# Patient Record
Sex: MALE | Race: WHITE | NOT HISPANIC OR LATINO | Employment: FULL TIME | URBAN - NONMETROPOLITAN AREA
[De-identification: names, ages, dates, MRNs, and addresses within clinical notes are randomized per-mention and may not be internally consistent; named-entity substitution may affect disease eponyms.]

---

## 2017-11-28 ENCOUNTER — OFFICE VISIT (OUTPATIENT)
Dept: URGENT CARE | Facility: PHYSICIAN GROUP | Age: 42
End: 2017-11-28
Payer: COMMERCIAL

## 2017-11-28 VITALS
RESPIRATION RATE: 16 BRPM | TEMPERATURE: 98.4 F | BODY MASS INDEX: 29.53 KG/M2 | HEART RATE: 94 BPM | SYSTOLIC BLOOD PRESSURE: 142 MMHG | OXYGEN SATURATION: 95 % | HEIGHT: 72 IN | WEIGHT: 218 LBS | DIASTOLIC BLOOD PRESSURE: 80 MMHG

## 2017-11-28 DIAGNOSIS — H10.9 CONJUNCTIVITIS OF BOTH EYES, UNSPECIFIED CONJUNCTIVITIS TYPE: ICD-10-CM

## 2017-11-28 PROCEDURE — 99203 OFFICE O/P NEW LOW 30 MIN: CPT | Performed by: PHYSICIAN ASSISTANT

## 2017-11-28 RX ORDER — POLYMYXIN B SULFATE AND TRIMETHOPRIM 1; 10000 MG/ML; [USP'U]/ML
1 SOLUTION OPHTHALMIC EVERY 4 HOURS
Qty: 10 ML | Refills: 0 | Status: SHIPPED | OUTPATIENT
Start: 2017-11-28 | End: 2017-12-05

## 2017-11-28 NOTE — LETTER
November 28, 2017         Patient: Enrique Drake   YOB: 1975   Date of Visit: 11/28/2017           To Whom it May Concern:    Enrique Drake was seen in my clinic on 11/28/2017. He may return to work on 11/29/17.    If you have any questions or concerns, please don't hesitate to call.        Sincerely,           Maura Ny P.A.-C.  Electronically Signed

## 2017-11-29 NOTE — PROGRESS NOTES
Chief Complaint   Patient presents with   • Conjunctivitis     Bilateral       HISTORY OF PRESENT ILLNESS: Patient is a 42 y.o. male who presents today for the following:    Patient comes in for evaluation of red eyes that started last night. He complains of burning, itching, and copious amounts of exudate. He denies blurry vision, ocular pain, foreign body sensation, and does not wear contact lenses. His son had similar symptoms several days ago. He needs a note to miss work because his vision is intermittently blurry when he has the copious amount of exudate.    Patient Active Problem List    Diagnosis Date Noted   • Alcohol abuse 2013   • Elevated liver function tests 2013   • Elevated blood pressure 2013   • Dyslipidemia 2013   • Vitamin d deficiency 2013   • Back pain 2013       Allergies:Nkda [no known drug allergy]    Current Outpatient Prescriptions Ordered in [facility]   Medication Sig Dispense Refill   • polymixin-trimethoprim (POLYTRIM) 25551-9.1 UNIT/ML-% Solution Place 1 Drop in both eyes every 4 hours for 7 days. 10 mL 0   • vitamin D, Ergocalciferol, (DRISDOL) 44569 UNITS CAPS capsule Take 1 Cap by mouth every 7 days. 4 Cap 2   • ibuprofen (MOTRIN) 600 MG TABS Take 600 mg by mouth every 8 hours as needed.    Indications: pain       No current Epic-ordered facility-administered medications on file.        History reviewed. No pertinent past medical history.    Social History   Substance Use Topics   • Smoking status: Current Every Day Smoker     Packs/day: 1.00     Years: 20.00     Types: Cigarettes     Last attempt to quit: 3/3/2013   • Smokeless tobacco: Former User     Types: Chew     Quit date: 3/4/2012      Comment: chewed for 10 years   • Alcohol use 21.0 oz/week     42 Cans of beer per week       Family Status   Relation Status   • Mother  at age 66    pneumonia   • Father Alive    65 in    • Brother Alive    44 in    • Maternal Grandmother   at age 70's    unknown   • Maternal Grandfather  at age 60's    MI   • Paternal Grandmother  at age 78    unknown   • Paternal Grandfather     unknown   • Brother Alive    38 in      Family History   Problem Relation Age of Onset   • Other Mother      smoker   • Lung Disease Mother      COPD   • Diabetes Father    • Other Brother      gout   • Heart Attack Maternal Grandfather    • Other Brother      epilepsy       ROS:   Review of Systems   Constitutional: Negative for fever, chills, weight loss and malaise/fatigue.   HENT: Negative for ear pain, nosebleeds, congestion, sore throat and neck pain.    Eyes: Positive for blurred vision.   Respiratory: Negative for cough, sputum production, shortness of breath and wheezing.    Cardiovascular: Negative for chest pain, palpitations, orthopnea and leg swelling.   Gastrointestinal: Negative for heartburn, nausea, vomiting and abdominal pain.   Genitourinary: Negative for dysuria, urgency and frequency.       Exam:  Blood pressure 142/80, pulse 94, temperature 36.9 °C (98.4 °F), resp. rate 16, height 1.829 m (6'), weight 98.9 kg (218 lb), SpO2 95 %.  General: Well developed, well nourished. No distress.  HEENT: Conjunctiva injected bilaterally with a moderate amount of exudate. PERRL, EOMI.  Pulmonary: No respiratory distress noted.  Neurologic: Grossly nonfocal.  Skin: Warm, dry, good turgor. No rashes in visible areas.   Psych: Normal mood. Alert and oriented x3. Judgment and insight is normal.    Assessment/Plan:  Take all medications as directed. Follow up for worsening or persistent symptoms.  1. Conjunctivitis of both eyes, unspecified conjunctivitis type  polymixin-trimethoprim (POLYTRIM) 21337-5.1 UNIT/ML-% Solution

## 2019-08-09 ENCOUNTER — HOSPITAL ENCOUNTER (OUTPATIENT)
Dept: LAB | Facility: MEDICAL CENTER | Age: 44
End: 2019-08-09
Attending: FAMILY MEDICINE
Payer: COMMERCIAL

## 2019-08-09 LAB
ALBUMIN SERPL BCP-MCNC: 3.3 G/DL (ref 3.2–4.9)
ALBUMIN/GLOB SERPL: 0.9 G/DL
ALP SERPL-CCNC: 122 U/L (ref 30–99)
ALT SERPL-CCNC: 16 U/L (ref 2–50)
ANION GAP SERPL CALC-SCNC: 9 MMOL/L (ref 0–11.9)
AST SERPL-CCNC: 45 U/L (ref 12–45)
BASOPHILS # BLD AUTO: 1 % (ref 0–1.8)
BASOPHILS # BLD: 0.07 K/UL (ref 0–0.12)
BILIRUB SERPL-MCNC: 3.1 MG/DL (ref 0.1–1.5)
BUN SERPL-MCNC: 7 MG/DL (ref 8–22)
CALCIUM SERPL-MCNC: 8.9 MG/DL (ref 8.5–10.5)
CHLORIDE SERPL-SCNC: 105 MMOL/L (ref 96–112)
CHOLEST SERPL-MCNC: 165 MG/DL (ref 100–199)
CO2 SERPL-SCNC: 25 MMOL/L (ref 20–33)
CREAT SERPL-MCNC: 0.66 MG/DL (ref 0.5–1.4)
EOSINOPHIL # BLD AUTO: 0.45 K/UL (ref 0–0.51)
EOSINOPHIL NFR BLD: 6.4 % (ref 0–6.9)
ERYTHROCYTE [DISTWIDTH] IN BLOOD BY AUTOMATED COUNT: 56.7 FL (ref 35.9–50)
FASTING STATUS PATIENT QL REPORTED: NORMAL
FERRITIN SERPL-MCNC: 45.7 NG/ML (ref 22–322)
GLOBULIN SER CALC-MCNC: 3.6 G/DL (ref 1.9–3.5)
GLUCOSE SERPL-MCNC: 112 MG/DL (ref 65–99)
HCT VFR BLD AUTO: 34.1 % (ref 42–52)
HDLC SERPL-MCNC: 30 MG/DL
HGB BLD-MCNC: 10.5 G/DL (ref 14–18)
IMM GRANULOCYTES # BLD AUTO: 0.02 K/UL (ref 0–0.11)
IMM GRANULOCYTES NFR BLD AUTO: 0.3 % (ref 0–0.9)
IRON SATN MFR SERPL: 6 % (ref 15–55)
IRON SERPL-MCNC: 27 UG/DL (ref 50–180)
LDLC SERPL CALC-MCNC: 111 MG/DL
LYMPHOCYTES # BLD AUTO: 2.04 K/UL (ref 1–4.8)
LYMPHOCYTES NFR BLD: 29.2 % (ref 22–41)
MCH RBC QN AUTO: 30.3 PG (ref 27–33)
MCHC RBC AUTO-ENTMCNC: 30.8 G/DL (ref 33.7–35.3)
MCV RBC AUTO: 98.3 FL (ref 81.4–97.8)
MONOCYTES # BLD AUTO: 0.74 K/UL (ref 0–0.85)
MONOCYTES NFR BLD AUTO: 10.6 % (ref 0–13.4)
NEUTROPHILS # BLD AUTO: 3.66 K/UL (ref 1.82–7.42)
NEUTROPHILS NFR BLD: 52.5 % (ref 44–72)
NRBC # BLD AUTO: 0 K/UL
NRBC BLD-RTO: 0 /100 WBC
NT-PROBNP SERPL IA-MCNC: 206 PG/ML (ref 0–125)
PLATELET # BLD AUTO: 127 K/UL (ref 164–446)
PMV BLD AUTO: 10.5 FL (ref 9–12.9)
POTASSIUM SERPL-SCNC: 4.2 MMOL/L (ref 3.6–5.5)
PROT SERPL-MCNC: 6.9 G/DL (ref 6–8.2)
RBC # BLD AUTO: 3.47 M/UL (ref 4.7–6.1)
SODIUM SERPL-SCNC: 139 MMOL/L (ref 135–145)
T3FREE SERPL-MCNC: 3.8 PG/ML (ref 2.4–4.2)
T4 FREE SERPL-MCNC: 1.51 NG/DL (ref 0.53–1.43)
TIBC SERPL-MCNC: 448 UG/DL (ref 250–450)
TRANSFERRIN SERPL-MCNC: 303 MG/DL (ref 200–370)
TRIGL SERPL-MCNC: 120 MG/DL (ref 0–149)
TSH SERPL DL<=0.005 MIU/L-ACNC: 1.08 UIU/ML (ref 0.38–5.33)
WBC # BLD AUTO: 7 K/UL (ref 4.8–10.8)

## 2019-08-09 PROCEDURE — 80053 COMPREHEN METABOLIC PANEL: CPT

## 2019-08-09 PROCEDURE — 83540 ASSAY OF IRON: CPT

## 2019-08-09 PROCEDURE — 86800 THYROGLOBULIN ANTIBODY: CPT

## 2019-08-09 PROCEDURE — 80061 LIPID PANEL: CPT

## 2019-08-09 PROCEDURE — 82728 ASSAY OF FERRITIN: CPT

## 2019-08-09 PROCEDURE — 83880 ASSAY OF NATRIURETIC PEPTIDE: CPT

## 2019-08-09 PROCEDURE — 84439 ASSAY OF FREE THYROXINE: CPT

## 2019-08-09 PROCEDURE — 36415 COLL VENOUS BLD VENIPUNCTURE: CPT

## 2019-08-09 PROCEDURE — 84481 FREE ASSAY (FT-3): CPT

## 2019-08-09 PROCEDURE — 84443 ASSAY THYROID STIM HORMONE: CPT

## 2019-08-09 PROCEDURE — 84466 ASSAY OF TRANSFERRIN: CPT

## 2019-08-09 PROCEDURE — 83550 IRON BINDING TEST: CPT

## 2019-08-09 PROCEDURE — 85025 COMPLETE CBC W/AUTO DIFF WBC: CPT

## 2019-08-11 LAB — THYROGLOB AB SERPL-ACNC: <0.9 IU/ML (ref 0–4)

## 2020-03-23 ENCOUNTER — OFFICE VISIT (OUTPATIENT)
Dept: URGENT CARE | Facility: PHYSICIAN GROUP | Age: 45
End: 2020-03-23
Payer: COMMERCIAL

## 2020-03-23 VITALS
DIASTOLIC BLOOD PRESSURE: 80 MMHG | RESPIRATION RATE: 16 BRPM | SYSTOLIC BLOOD PRESSURE: 130 MMHG | OXYGEN SATURATION: 97 % | HEIGHT: 72 IN | BODY MASS INDEX: 27.5 KG/M2 | WEIGHT: 203 LBS | TEMPERATURE: 98.3 F | HEART RATE: 79 BPM

## 2020-03-23 DIAGNOSIS — J02.8 ACUTE PHARYNGITIS DUE TO OTHER SPECIFIED ORGANISMS: ICD-10-CM

## 2020-03-23 LAB
INT CON NEG: NEGATIVE
INT CON POS: POSITIVE
S PYO AG THROAT QL: NORMAL

## 2020-03-23 PROCEDURE — 99203 OFFICE O/P NEW LOW 30 MIN: CPT | Performed by: INTERNAL MEDICINE

## 2020-03-23 PROCEDURE — 87880 STREP A ASSAY W/OPTIC: CPT | Performed by: INTERNAL MEDICINE

## 2020-03-23 ASSESSMENT — ENCOUNTER SYMPTOMS
SWOLLEN GLANDS: 1
FEVER: 0
TROUBLE SWALLOWING: 0
SHORTNESS OF BREATH: 1
HEADACHES: 0
CHILLS: 0
COUGH: 0

## 2020-03-23 ASSESSMENT — FIBROSIS 4 INDEX: FIB4 SCORE: 3.72

## 2020-03-23 NOTE — LETTER
March 23, 2020       Patient: Enrique Drake   YOB: 1975   Date of Visit: 3/23/2020         To Whom It May Concern:    It is my medical opinion that Enrique Drake return to full duty, no restrictions..    If you have any questions or concerns, please don't hesitate to call [phone]          Sincerely,          Zia Huggins M.D.  Electronically Signed

## 2020-03-23 NOTE — PROGRESS NOTES
Subjective:     Enrique Drake is a 44 y.o. male who presents for Pharyngitis (needs note to go back to work is feeling better)       Pharyngitis    This is a new problem. The current episode started in the past 7 days. The problem has been gradually improving. There has been no fever. The pain is mild. Associated symptoms include shortness of breath and swollen glands. Pertinent negatives include no congestion, coughing, headaches or trouble swallowing.   History reviewed. No pertinent past medical history.History reviewed. No pertinent surgical history.  Social History     Socioeconomic History   • Marital status:      Spouse name: Not on file   • Number of children: Not on file   • Years of education: Not on file   • Highest education level: Not on file   Occupational History   • Not on file   Social Needs   • Financial resource strain: Not on file   • Food insecurity     Worry: Not on file     Inability: Not on file   • Transportation needs     Medical: Not on file     Non-medical: Not on file   Tobacco Use   • Smoking status: Current Every Day Smoker     Packs/day: 1.00     Years: 20.00     Pack years: 20.00     Types: Cigarettes     Last attempt to quit: 3/3/2013     Years since quittin.0   • Smokeless tobacco: Former User     Types: Chew     Quit date: 3/4/2012   • Tobacco comment: chewed for 10 years   Substance and Sexual Activity   • Alcohol use: Not Currently     Alcohol/week: 21.0 oz     Types: 42 Cans of beer per week   • Drug use: Yes     Types: Marijuana     Comment: at night prn   • Sexual activity: Yes     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: , printer   Lifestyle   • Physical activity     Days per week: Not on file     Minutes per session: Not on file   • Stress: Not on file   Relationships   • Social connections     Talks on phone: Not on file     Gets together: Not on file     Attends Worship service: Not on file     Active member of club or organization: Not on file      Attends meetings of clubs or organizations: Not on file     Relationship status: Not on file   • Intimate partner violence     Fear of current or ex partner: Not on file     Emotionally abused: Not on file     Physically abused: Not on file     Forced sexual activity: Not on file   Other Topics Concern   • Not on file   Social History Narrative   • Not on file      Family History   Problem Relation Age of Onset   • Other Mother         smoker   • Lung Disease Mother         COPD   • Diabetes Father    • Other Brother         gout   • Heart Attack Maternal Grandfather    • Other Brother         epilepsy    Review of Systems   Constitutional: Negative for chills and fever.   HENT: Negative for congestion and trouble swallowing.    Respiratory: Positive for shortness of breath. Negative for cough.    Neurological: Negative for headaches.   All other systems reviewed and are negative.    Allergies   Allergen Reactions   • Nkda [No Known Drug Allergy]       Objective:   /80   Pulse 79   Temp 36.8 °C (98.3 °F) (Temporal)   Resp 16   Ht 1.829 m (6')   Wt 92.1 kg (203 lb)   SpO2 97%   BMI 27.53 kg/m²   Physical Exam  Vitals signs reviewed.   Constitutional:       General: He is not in acute distress.     Appearance: He is well-developed.   HENT:      Head: Normocephalic and atraumatic.      Mouth/Throat:      Mouth: Mucous membranes are moist.      Pharynx: Oropharynx is clear. Uvula midline. Posterior oropharyngeal erythema present. No oropharyngeal exudate.      Tonsils: No tonsillar abscesses.   Eyes:      Conjunctiva/sclera: Conjunctivae normal.   Neck:      Musculoskeletal: No neck rigidity.   Cardiovascular:      Rate and Rhythm: Normal rate and regular rhythm.   Pulmonary:      Effort: Pulmonary effort is normal. No respiratory distress.      Breath sounds: Normal breath sounds.   Lymphadenopathy:      Cervical: No cervical adenopathy.   Skin:     General: Skin is warm and dry.      Capillary Refill:  Capillary refill takes less than 2 seconds.   Neurological:      Mental Status: He is alert and oriented to person, place, and time.      Sensory: No sensory deficit.      Deep Tendon Reflexes: Reflexes are normal and symmetric.   Psychiatric:         Mood and Affect: Mood normal.         Behavior: Behavior normal.           Assessment/Plan:   Assessment    1. Acute pharyngitis due to other specified organisms  - POCT Rapid Strep A    Strep was negative    Advised patient to take Tylenol 1 g 3 times a day as needed, ibuprofen 600 mg 4 times a day as needed    Work note was given    High risk conditions including peritonsillar abscess, coronavirus was considered in the differential diagnosis  Differential diagnosis, natural history, supportive care, and indications for immediate follow-up discussed.

## 2020-06-15 ENCOUNTER — HOSPITAL ENCOUNTER (INPATIENT)
Facility: MEDICAL CENTER | Age: 45
LOS: 2 days | DRG: 370 | End: 2020-06-17
Attending: EMERGENCY MEDICINE | Admitting: HOSPITALIST
Payer: COMMERCIAL

## 2020-06-15 DIAGNOSIS — R71.0 HEMOGLOBIN DECREASED: ICD-10-CM

## 2020-06-15 DIAGNOSIS — K92.2 UPPER GI BLEED: ICD-10-CM

## 2020-06-15 DIAGNOSIS — K92.0 HEMATEMESIS WITH NAUSEA: ICD-10-CM

## 2020-06-15 DIAGNOSIS — I85.01 BLEEDING ESOPHAGEAL VARICES, UNSPECIFIED ESOPHAGEAL VARICES TYPE (HCC): ICD-10-CM

## 2020-06-15 PROBLEM — Z72.0 TOBACCO ABUSE: Status: ACTIVE | Noted: 2020-06-15

## 2020-06-15 PROBLEM — K70.30 ALCOHOLIC CIRRHOSIS OF LIVER WITHOUT ASCITES (HCC): Status: ACTIVE | Noted: 2020-06-15

## 2020-06-15 LAB
ABO + RH BLD: NORMAL
ABO GROUP BLD: NORMAL
ALBUMIN SERPL BCP-MCNC: 3.7 G/DL (ref 3.2–4.9)
ALBUMIN/GLOB SERPL: 1.3 G/DL
ALP SERPL-CCNC: 178 U/L (ref 30–99)
ALT SERPL-CCNC: 24 U/L (ref 2–50)
ANION GAP SERPL CALC-SCNC: 15 MMOL/L (ref 7–16)
ANISOCYTOSIS BLD QL SMEAR: ABNORMAL
APTT PPP: 43.2 SEC (ref 24.7–36)
AST SERPL-CCNC: 77 U/L (ref 12–45)
BASOPHILS # BLD AUTO: 1 % (ref 0–1.8)
BASOPHILS # BLD: 0.08 K/UL (ref 0–0.12)
BILIRUB SERPL-MCNC: 7.1 MG/DL (ref 0.1–1.5)
BLD GP AB SCN SERPL QL: NORMAL
BUN SERPL-MCNC: 11 MG/DL (ref 8–22)
CALCIUM SERPL-MCNC: 8.1 MG/DL (ref 8.5–10.5)
CHLORIDE SERPL-SCNC: 106 MMOL/L (ref 96–112)
CO2 SERPL-SCNC: 19 MMOL/L (ref 20–33)
COMMENT 1642: NORMAL
CREAT SERPL-MCNC: 0.33 MG/DL (ref 0.5–1.4)
EKG IMPRESSION: NORMAL
EOSINOPHIL # BLD AUTO: 0.06 K/UL (ref 0–0.51)
EOSINOPHIL NFR BLD: 0.8 % (ref 0–6.9)
ERYTHROCYTE [DISTWIDTH] IN BLOOD BY AUTOMATED COUNT: 70.5 FL (ref 35.9–50)
GLOBULIN SER CALC-MCNC: 2.9 G/DL (ref 1.9–3.5)
GLUCOSE SERPL-MCNC: 127 MG/DL (ref 65–99)
HCT VFR BLD AUTO: 34.8 % (ref 42–52)
HGB BLD-MCNC: 10.3 G/DL (ref 14–18)
HGB BLD-MCNC: 11.2 G/DL (ref 14–18)
IMM GRANULOCYTES # BLD AUTO: 0.05 K/UL (ref 0–0.11)
IMM GRANULOCYTES NFR BLD AUTO: 0.7 % (ref 0–0.9)
INR PPP: 1.83 (ref 0.87–1.13)
LYMPHOCYTES # BLD AUTO: 1.45 K/UL (ref 1–4.8)
LYMPHOCYTES NFR BLD: 19 % (ref 22–41)
MACROCYTES BLD QL SMEAR: ABNORMAL
MCH RBC QN AUTO: 28.9 PG (ref 27–33)
MCHC RBC AUTO-ENTMCNC: 32.2 G/DL (ref 33.7–35.3)
MCV RBC AUTO: 89.7 FL (ref 81.4–97.8)
MICROCYTES BLD QL SMEAR: ABNORMAL
MONOCYTES # BLD AUTO: 0.79 K/UL (ref 0–0.85)
MONOCYTES NFR BLD AUTO: 10.4 % (ref 0–13.4)
MORPHOLOGY BLD-IMP: NORMAL
NEUTROPHILS # BLD AUTO: 5.19 K/UL (ref 1.82–7.42)
NEUTROPHILS NFR BLD: 68.1 % (ref 44–72)
NRBC # BLD AUTO: 0 K/UL
NRBC BLD-RTO: 0 /100 WBC
OVALOCYTES BLD QL SMEAR: NORMAL
PLATELET # BLD AUTO: 82 K/UL (ref 164–446)
PLATELET BLD QL SMEAR: NORMAL
PMV BLD AUTO: 10.9 FL (ref 9–12.9)
POIKILOCYTOSIS BLD QL SMEAR: NORMAL
POLYCHROMASIA BLD QL SMEAR: NORMAL
POTASSIUM SERPL-SCNC: 4.1 MMOL/L (ref 3.6–5.5)
PROT SERPL-MCNC: 6.6 G/DL (ref 6–8.2)
PROTHROMBIN TIME: 21.8 SEC (ref 12–14.6)
RBC # BLD AUTO: 3.88 M/UL (ref 4.7–6.1)
RBC BLD AUTO: PRESENT
RH BLD: NORMAL
SODIUM SERPL-SCNC: 140 MMOL/L (ref 135–145)
WBC # BLD AUTO: 7.6 K/UL (ref 4.8–10.8)

## 2020-06-15 PROCEDURE — 85730 THROMBOPLASTIN TIME PARTIAL: CPT

## 2020-06-15 PROCEDURE — 36415 COLL VENOUS BLD VENIPUNCTURE: CPT

## 2020-06-15 PROCEDURE — 85025 COMPLETE CBC W/AUTO DIFF WBC: CPT

## 2020-06-15 PROCEDURE — 99406 BEHAV CHNG SMOKING 3-10 MIN: CPT | Performed by: HOSPITALIST

## 2020-06-15 PROCEDURE — 700111 HCHG RX REV CODE 636 W/ 250 OVERRIDE (IP): Performed by: HOSPITALIST

## 2020-06-15 PROCEDURE — 99358 PROLONG SERVICE W/O CONTACT: CPT | Mod: 25 | Performed by: HOSPITALIST

## 2020-06-15 PROCEDURE — 93005 ELECTROCARDIOGRAM TRACING: CPT | Performed by: EMERGENCY MEDICINE

## 2020-06-15 PROCEDURE — 96368 THER/DIAG CONCURRENT INF: CPT

## 2020-06-15 PROCEDURE — 770020 HCHG ROOM/CARE - TELE (206)

## 2020-06-15 PROCEDURE — 700105 HCHG RX REV CODE 258: Performed by: HOSPITALIST

## 2020-06-15 PROCEDURE — 85610 PROTHROMBIN TIME: CPT

## 2020-06-15 PROCEDURE — 99285 EMERGENCY DEPT VISIT HI MDM: CPT

## 2020-06-15 PROCEDURE — 86901 BLOOD TYPING SEROLOGIC RH(D): CPT

## 2020-06-15 PROCEDURE — C9113 INJ PANTOPRAZOLE SODIUM, VIA: HCPCS | Performed by: EMERGENCY MEDICINE

## 2020-06-15 PROCEDURE — 80053 COMPREHEN METABOLIC PANEL: CPT

## 2020-06-15 PROCEDURE — 700111 HCHG RX REV CODE 636 W/ 250 OVERRIDE (IP): Performed by: EMERGENCY MEDICINE

## 2020-06-15 PROCEDURE — 96365 THER/PROPH/DIAG IV INF INIT: CPT

## 2020-06-15 PROCEDURE — 99223 1ST HOSP IP/OBS HIGH 75: CPT | Mod: 25 | Performed by: HOSPITALIST

## 2020-06-15 PROCEDURE — 85018 HEMOGLOBIN: CPT

## 2020-06-15 PROCEDURE — 86850 RBC ANTIBODY SCREEN: CPT

## 2020-06-15 PROCEDURE — 700105 HCHG RX REV CODE 258: Performed by: EMERGENCY MEDICINE

## 2020-06-15 PROCEDURE — 86900 BLOOD TYPING SEROLOGIC ABO: CPT

## 2020-06-15 RX ORDER — PROCHLORPERAZINE EDISYLATE 5 MG/ML
5-10 INJECTION INTRAMUSCULAR; INTRAVENOUS EVERY 4 HOURS PRN
Status: DISCONTINUED | OUTPATIENT
Start: 2020-06-15 | End: 2020-06-17 | Stop reason: HOSPADM

## 2020-06-15 RX ORDER — NAPROXEN SODIUM 220 MG
220 TABLET ORAL
Status: ON HOLD | COMMUNITY
End: 2020-06-17

## 2020-06-15 RX ORDER — SODIUM CHLORIDE 9 MG/ML
1000 INJECTION, SOLUTION INTRAVENOUS ONCE
Status: COMPLETED | OUTPATIENT
Start: 2020-06-15 | End: 2020-06-15

## 2020-06-15 RX ORDER — ONDANSETRON 2 MG/ML
4 INJECTION INTRAMUSCULAR; INTRAVENOUS EVERY 4 HOURS PRN
Status: DISCONTINUED | OUTPATIENT
Start: 2020-06-15 | End: 2020-06-17 | Stop reason: HOSPADM

## 2020-06-15 RX ORDER — SODIUM CHLORIDE, SODIUM LACTATE, POTASSIUM CHLORIDE, CALCIUM CHLORIDE 600; 310; 30; 20 MG/100ML; MG/100ML; MG/100ML; MG/100ML
INJECTION, SOLUTION INTRAVENOUS CONTINUOUS
Status: DISCONTINUED | OUTPATIENT
Start: 2020-06-15 | End: 2020-06-15

## 2020-06-15 RX ORDER — ONDANSETRON 4 MG/1
4 TABLET, ORALLY DISINTEGRATING ORAL EVERY 4 HOURS PRN
Status: DISCONTINUED | OUTPATIENT
Start: 2020-06-15 | End: 2020-06-17 | Stop reason: HOSPADM

## 2020-06-15 RX ORDER — PROMETHAZINE HYDROCHLORIDE 25 MG/1
12.5-25 SUPPOSITORY RECTAL EVERY 4 HOURS PRN
Status: DISCONTINUED | OUTPATIENT
Start: 2020-06-15 | End: 2020-06-17 | Stop reason: HOSPADM

## 2020-06-15 RX ORDER — PROMETHAZINE HYDROCHLORIDE 25 MG/1
12.5-25 TABLET ORAL EVERY 4 HOURS PRN
Status: DISCONTINUED | OUTPATIENT
Start: 2020-06-15 | End: 2020-06-17 | Stop reason: HOSPADM

## 2020-06-15 RX ADMIN — SODIUM CHLORIDE, POTASSIUM CHLORIDE, SODIUM LACTATE AND CALCIUM CHLORIDE: 600; 310; 30; 20 INJECTION, SOLUTION INTRAVENOUS at 18:07

## 2020-06-15 RX ADMIN — OCTREOTIDE ACETATE 50 MCG/HR: 200 INJECTION, SOLUTION INTRAVENOUS; SUBCUTANEOUS at 18:07

## 2020-06-15 RX ADMIN — SODIUM CHLORIDE 1000 ML: 9 INJECTION, SOLUTION INTRAVENOUS at 15:25

## 2020-06-15 RX ADMIN — ONDANSETRON 4 MG: 4 TABLET, ORALLY DISINTEGRATING ORAL at 18:24

## 2020-06-15 RX ADMIN — CEFTRIAXONE SODIUM 2 G: 2 INJECTION, POWDER, FOR SOLUTION INTRAMUSCULAR; INTRAVENOUS at 17:25

## 2020-06-15 RX ADMIN — SODIUM CHLORIDE 8 MG/HR: 9 INJECTION, SOLUTION INTRAVENOUS at 17:11

## 2020-06-15 ASSESSMENT — ENCOUNTER SYMPTOMS
HEMOPTYSIS: 0
SHORTNESS OF BREATH: 0
WHEEZING: 0
DOUBLE VISION: 0
NECK PAIN: 0
PND: 0
PHOTOPHOBIA: 0
TREMORS: 0
DIARRHEA: 0
HALLUCINATIONS: 0
COUGH: 0
FLANK PAIN: 0
FALLS: 0
CHILLS: 0
VOMITING: 1
SORE THROAT: 0
EYE PAIN: 0
DIZZINESS: 0
HEADACHES: 0
MYALGIAS: 0
CONSTIPATION: 0
BACK PAIN: 0
DIAPHORESIS: 0
CLAUDICATION: 0
ORTHOPNEA: 0
SPUTUM PRODUCTION: 0
ABDOMINAL PAIN: 0
NAUSEA: 1
PALPITATIONS: 0
TINGLING: 0
WEAKNESS: 0
SINUS PAIN: 0
BRUISES/BLEEDS EASILY: 0
STRIDOR: 0
BLURRED VISION: 0
BLOOD IN STOOL: 1
HEARTBURN: 0
DEPRESSION: 0
POLYDIPSIA: 0
FEVER: 0

## 2020-06-15 ASSESSMENT — LIFESTYLE VARIABLES
CONSUMPTION TOTAL: POSITIVE
DOES PATIENT WANT TO TALK TO SOMEONE ABOUT QUITTING: NO
ON A TYPICAL DAY WHEN YOU DRINK ALCOHOL HOW MANY DRINKS DO YOU HAVE: 3
HAVE PEOPLE ANNOYED YOU BY CRITICIZING YOUR DRINKING: NO
TOTAL SCORE: 2
EVER FELT BAD OR GUILTY ABOUT YOUR DRINKING: YES
SUBSTANCE_ABUSE: 0
HOW MANY TIMES IN THE PAST YEAR HAVE YOU HAD 5 OR MORE DRINKS IN A DAY: 7
TOTAL SCORE: 2
AVERAGE NUMBER OF DAYS PER WEEK YOU HAVE A DRINK CONTAINING ALCOHOL: 6
ALCOHOL_USE: YES
TOTAL SCORE: 2
HAVE YOU EVER FELT YOU SHOULD CUT DOWN ON YOUR DRINKING: YES
EVER HAD A DRINK FIRST THING IN THE MORNING TO STEADY YOUR NERVES TO GET RID OF A HANGOVER: NO
DOES PATIENT WANT TO STOP DRINKING: YES

## 2020-06-15 ASSESSMENT — COGNITIVE AND FUNCTIONAL STATUS - GENERAL
SUGGESTED CMS G CODE MODIFIER MOBILITY: CH
MOBILITY SCORE: 24
DAILY ACTIVITIY SCORE: 24
SUGGESTED CMS G CODE MODIFIER DAILY ACTIVITY: CH

## 2020-06-15 ASSESSMENT — COPD QUESTIONNAIRES
DO YOU EVER COUGH UP ANY MUCUS OR PHLEGM?: YES, A FEW DAYS A WEEK OR MONTH
COPD SCREENING SCORE: 3
HAVE YOU SMOKED AT LEAST 100 CIGARETTES IN YOUR ENTIRE LIFE: YES
DURING THE PAST 4 WEEKS HOW MUCH DID YOU FEEL SHORT OF BREATH: NONE/LITTLE OF THE TIME

## 2020-06-15 ASSESSMENT — FIBROSIS 4 INDEX
FIB4 SCORE: 3.81
FIB4 SCORE: 8.24
FIB4 SCORE: 8.24

## 2020-06-15 ASSESSMENT — PATIENT HEALTH QUESTIONNAIRE - PHQ9
1. LITTLE INTEREST OR PLEASURE IN DOING THINGS: NOT AT ALL
SUM OF ALL RESPONSES TO PHQ9 QUESTIONS 1 AND 2: 0
2. FEELING DOWN, DEPRESSED, IRRITABLE, OR HOPELESS: NOT AT ALL

## 2020-06-15 NOTE — ED PROVIDER NOTES
ED Provider  Scribed for Juan Francisco Valentin D.O. by Joe Orozco. 6/15/2020  3:08 PM    Means of arrival: EMS  History obtained from: Patient  History limited by: None    CHIEF COMPLAINT  Chief Complaint   Patient presents with   • Blood in Vomit   • Rectal Bleeding       HPI  Enrique Drake is a 45 y.o. male presents as a transfer patient from [facility]. Per family, the patient has a history of ulcers and esophageal varices both diagnosed by endoscopy. at [facility]. Patient has required blood transfusions secondary to blood loss from his ulcers. Patient was told that his ulcers were healing and yesterday he was doing well, however was drinking alcohol. Today he suddenly started vomiting blood and thus went to [facility], however was transferred here as they have since lost their GI services. Patient has tried to follow up with [facility], however has been unable to be seen despite his best efforts and interventions from his physicians. He otherwise denies having any abdominal pain, chest pain, shortness of breath, cough, congestion, fevers or chills.    REVIEW OF SYSTEMS  See HPI for further details. All other systems are negative.     PAST MEDICAL HISTORY  Cirrhosis, Esophageal varices    SOCIAL HISTORY  Social History     Tobacco Use   • Smoking status: Current Every Day Smoker     Packs/day: 1.00     Years: 20.00     Pack years: 20.00     Types: Cigarettes     Last attempt to quit: 3/3/2013     Years since quittin.2   • Smokeless tobacco: Former User     Types: Chew     Quit date: 3/4/2012   • Tobacco comment: chewed for 10 years   Substance and Sexual Activity   • Alcohol use: Not Currently     Alcohol/week: 21.0 oz     Types: 42 Cans of beer per week   • Drug use: Yes     Types: Marijuana     Comment: at night prn   • Sexual activity: Yes     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      Current Outpatient Medications on File Prior to Encounter   Medication Sig Dispense Refill   • naproxen (ANAPROX) 220 MG tablet Take 220 mg by mouth 1 time daily as needed.         ALLERGIES  Allergies   Allergen Reactions   • Nkda [No Known Drug Allergy]        PHYSICAL EXAM  VITAL SIGNS: /75   Pulse (!) 115   Temp 37.3 °C (99.1 °F) (Temporal)   Resp 20   Ht 1.829 m (6')   Wt 93 kg (205 lb)   SpO2 96%   BMI 27.80 kg/m²   Constitutional: Alert  HENT: No signs of trauma, mucous membranes are moist  Eyes: Conjunctiva normal, Non-icteric.   Neck: Normal range of motion, No tenderness, Supple.  Lymphatic: No lymphadenopathy noted.   Cardiovascular: Tachycardic rate, regular rhythm, no murmurs.   Thorax & Lungs: Normal breath sounds, No respiratory distress, No wheezing, No chest tenderness.   Abdomen: Bowel sounds normal, Soft, No tenderness, No masses, No pulsatile masses. No peritoneal signs.  Skin: Warm, Dry, Pale,  Back: No bony tenderness, No CVA tenderness.   Extremities: No edema, No tenderness, No cyanosis  Musculoskeletal: Good range of motion in all major joints. No tenderness to palpation or major deformities noted.   Neurologic: Alert and oriented x4, Normal motor function, Normal sensory function, No focal deficits noted.   Psychiatric: Affect normal, Judgment normal, Mood normal.     DIAGNOSTIC STUDIES / PROCEDURES    EKG  12 Lead EKG interpreted by me shown below.     LABS  Results for orders placed or performed during the hospital encounter of 06/15/20   CBC WITH DIFFERENTIAL   Result Value Ref Range    WBC 7.6 4.8 - 10.8 K/uL    RBC 3.88 (L) 4.70 - 6.10 M/uL    Hemoglobin 11.2 (L) 14.0 - 18.0 g/dL    Hematocrit 34.8 (L) 42.0 - 52.0 %    MCV 89.7 81.4 - 97.8 fL    MCH 28.9 27.0 - 33.0 pg    MCHC 32.2 (L) 33.7 - 35.3 g/dL    RDW 70.5 (H) 35.9 - 50.0 fL    Platelet Count 82 (L) 164 - 446 K/uL    MPV 10.9 9.0 - 12.9 fL     Neutrophils-Polys 68.10 44.00 - 72.00 %    Lymphocytes 19.00 (L) 22.00 - 41.00 %    Monocytes 10.40 0.00 - 13.40 %    Eosinophils 0.80 0.00 - 6.90 %    Basophils 1.00 0.00 - 1.80 %    Immature Granulocytes 0.70 0.00 - 0.90 %    Nucleated RBC 0.00 /100 WBC    Neutrophils (Absolute) 5.19 1.82 - 7.42 K/uL    Lymphs (Absolute) 1.45 1.00 - 4.80 K/uL    Monos (Absolute) 0.79 0.00 - 0.85 K/uL    Eos (Absolute) 0.06 0.00 - 0.51 K/uL    Baso (Absolute) 0.08 0.00 - 0.12 K/uL    Immature Granulocytes (abs) 0.05 0.00 - 0.11 K/uL    NRBC (Absolute) 0.00 K/uL    Anisocytosis 2+     Macrocytosis 1+     Microcytosis 1+    COMP METABOLIC PANEL   Result Value Ref Range    Sodium 140 135 - 145 mmol/L    Potassium 4.1 3.6 - 5.5 mmol/L    Chloride 106 96 - 112 mmol/L    Co2 19 (L) 20 - 33 mmol/L    Anion Gap 15.0 7.0 - 16.0    Glucose 127 (H) 65 - 99 mg/dL    Bun 11 8 - 22 mg/dL    Creatinine 0.33 (L) 0.50 - 1.40 mg/dL    Calcium 8.1 (L) 8.5 - 10.5 mg/dL    AST(SGOT) 77 (H) 12 - 45 U/L    ALT(SGPT) 24 2 - 50 U/L    Alkaline Phosphatase 178 (H) 30 - 99 U/L    Total Bilirubin 7.1 (H) 0.1 - 1.5 mg/dL    Albumin 3.7 3.2 - 4.9 g/dL    Total Protein 6.6 6.0 - 8.2 g/dL    Globulin 2.9 1.9 - 3.5 g/dL    A-G Ratio 1.3 g/dL   PROTHROMBIN TIME (INR)   Result Value Ref Range    PT 21.8 (H) 12.0 - 14.6 sec    INR 1.83 (H) 0.87 - 1.13   APTT   Result Value Ref Range    APTT 43.2 (H) 24.7 - 36.0 sec   COD (ADULT)   Result Value Ref Range    ABO Grouping Only O     Rh Grouping Only POS     Antibody Screen-Cod NEG    ABO Rh Confirm   Result Value Ref Range    ABO Rh Confirm O POS    PERIPHERAL SMEAR REVIEW   Result Value Ref Range    Peripheral Smear Review see below    PLATELET ESTIMATE   Result Value Ref Range    Plt Estimation Decreased    MORPHOLOGY   Result Value Ref Range    RBC Morphology Present     Polychromia 1+     Poikilocytosis 1+     Ovalocytes 1+    DIFFERENTIAL COMMENT   Result Value Ref Range    Comments-Diff see below    ESTIMATED  GFR   Result Value Ref Range    GFR If African American >60 >60 mL/min/1.73 m 2    GFR If Non African American >60 >60 mL/min/1.73 m 2   HGB (Hemoglobin) for 48 hours   Result Value Ref Range    Hemoglobin 10.3 (L) 14.0 - 18.0 g/dL   EKG (NOW)   Result Value Ref Range    Report       [facility] Emergency Dept.    Test Date:  2020-06-15  Pt Name:    TONG STAPLETON          Department: ER  MRN:        [id number]                      Room:       [facility]  Gender:     Male                         Technician: [id number]  :        1975                   Requested By:JAZMYNE BALLARD  Order #:    [id number]                    Reading MD: JAZMYNE BALLARD D.O.    Measurements  Intervals                                Axis  Rate:       114                          P:          48  KY:         156                          QRS:        43  QRSD:       96                           T:          48  QT:         336  QTc:        463    Interpretive Statements  SINUS TACHYCARDIA  No previous ECG available for comparison  Electronically Signed On 6- 16:29:31 PDT by JAZMYNE BALLARD D.O.       All labs reviewed by me.    COURSE  Pertinent Labs & Imaging studies reviewed. (See chart for details)    Review of past medical records shows the patient has a history of esophogeal varices and cirrhosis, and has no recent appointments with GI.    3:08 PM - Patient seen and examined at bedside. Discussed plan of care. The patient will be resuscitated with 1L NS IV and medicated with Protonix 80 mg in  ml, and Sandstatin 1250 mcg in  ml. Ordered for Peripheral smear review, Platelet estimate, Morphology, Differential comment, Estimated GFR Prothrombin Time INR, APTT, COD Adult, CBC with differential, CMP, EKG to evaluate his symptoms.     3:48 PM - Paged Hospitalist    4:25 PM - I spoke with Dr. Doan, Hospitalist, who agrees to evaluate the patient for hospitalization.    HYDRATION: Based on the  patient's presentation of Tachycardia the patient was given IV fluids. IV Hydration was used because oral hydration was not adequate alone. Upon recheck following hydration, the patient was improved.    CRITICAL CARE  The very real possibilty of a deterioration of this patient's condition required the highest level of my preparedness for sudden, emergent intervention.  I provided critical care services, which included medication orders, frequent reevaluations of the patient's condition and response to treatment, ordering and reviewing test results, and discussing the case with various consultants.  The critical care time associated with the care of the patient was 30 minutes. Review chart for interventions. This time is exclusive of any other billable procedures.     MEDICAL DECISION MAKING  This is a 45 y.o. male who presents with bright red vomit.  This started this morning.  Seen at [facility], and he had no GI on call there so he was transferred here for further evaluation and treatment.  Patient had tachycardia so IV fluids were initiated.  Repeat hemoglobin did show 1 drop in hemoglobin which is consistent with acute blood loss.  May also be dilution from the IV fluids.    His blood pressure has been stable.    Patient is history includes both of bleeding ulcers he is not on proton pump inhibitors, and he is also has cirrhosis with esophageal varices.  With that Protonix IV was given prior to arrival he was placed on Protonix drip here.  Sandostatin IV infusion was also initiated here.    Spoke with hospitalist for admission and patient will be admitted for further evaluation and treatment      DISPOSITION:  Patient will be hospitalized by Dr. Doan, Hospitalist in critical condition.    FINAL IMPRESSION  1. Hematemesis with nausea    2. Upper GI bleed    3. Bleeding esophageal varices, unspecified esophageal varices type (HCC)    4. Hemoglobin decreased        The critical care time associated  with the care of the patient was 30 minutes. Review chart for interventions. This time is exclusive of any other billable procedures.      IJoe (Scribe), am scribing for, and in the presence of, Juan Francisco Valentin D.O..    Electronically signed by: Joe Orozco (Scribe), 6/15/2020    IJuan Francisco D.O. personally performed the services described in this documentation, as scribed by Joe Orozco in my presence, and it is both accurate and complete. C.    The note accurately reflects work and decisions made by me.  Juan Francisco Valentin D.O.  6/15/2020  6:40 PM

## 2020-06-15 NOTE — ED TRIAGE NOTES
Name and  verified for triage    Pt here as transfer from [facility] with c/o bloody stools and emesis. Pt alcoholic- last drink yesterday at noon. Denies abdominal pain. Tachycardic upon triage

## 2020-06-16 ENCOUNTER — APPOINTMENT (OUTPATIENT)
Dept: RADIOLOGY | Facility: MEDICAL CENTER | Age: 45
DRG: 370 | End: 2020-06-16
Attending: HOSPITALIST
Payer: COMMERCIAL

## 2020-06-16 LAB
ALBUMIN SERPL BCP-MCNC: 3.1 G/DL (ref 3.2–4.9)
ALBUMIN/GLOB SERPL: 1.2 G/DL
ALP SERPL-CCNC: 141 U/L (ref 30–99)
ALT SERPL-CCNC: 21 U/L (ref 2–50)
AMMONIA PLAS-SCNC: 95 UMOL/L (ref 11–45)
ANION GAP SERPL CALC-SCNC: 9 MMOL/L (ref 7–16)
ANISOCYTOSIS BLD QL SMEAR: ABNORMAL
AST SERPL-CCNC: 67 U/L (ref 12–45)
BASOPHILS # BLD AUTO: 1 % (ref 0–1.8)
BASOPHILS # BLD: 0.07 K/UL (ref 0–0.12)
BILIRUB SERPL-MCNC: 7.3 MG/DL (ref 0.1–1.5)
BUN SERPL-MCNC: 15 MG/DL (ref 8–22)
CALCIUM SERPL-MCNC: 7.9 MG/DL (ref 8.5–10.5)
CHLORIDE SERPL-SCNC: 108 MMOL/L (ref 96–112)
CHOLEST SERPL-MCNC: 107 MG/DL (ref 100–199)
CO2 SERPL-SCNC: 22 MMOL/L (ref 20–33)
COMMENT 1642: NORMAL
CREAT SERPL-MCNC: 0.38 MG/DL (ref 0.5–1.4)
EOSINOPHIL # BLD AUTO: 0.16 K/UL (ref 0–0.51)
EOSINOPHIL NFR BLD: 2.3 % (ref 0–6.9)
ERYTHROCYTE [DISTWIDTH] IN BLOOD BY AUTOMATED COUNT: 70.4 FL (ref 35.9–50)
GLOBULIN SER CALC-MCNC: 2.6 G/DL (ref 1.9–3.5)
GLUCOSE SERPL-MCNC: 105 MG/DL (ref 65–99)
HCT VFR BLD AUTO: 29.3 % (ref 42–52)
HDLC SERPL-MCNC: 28 MG/DL
HGB BLD-MCNC: 9.4 G/DL (ref 14–18)
HGB BLD-MCNC: 9.5 G/DL (ref 14–18)
HGB BLD-MCNC: 9.5 G/DL (ref 14–18)
HYPOCHROMIA BLD QL SMEAR: ABNORMAL
IMM GRANULOCYTES # BLD AUTO: 0.01 K/UL (ref 0–0.11)
IMM GRANULOCYTES NFR BLD AUTO: 0.1 % (ref 0–0.9)
LDLC SERPL CALC-MCNC: 57 MG/DL
LG PLATELETS BLD QL SMEAR: NORMAL
LYMPHOCYTES # BLD AUTO: 1.72 K/UL (ref 1–4.8)
LYMPHOCYTES NFR BLD: 24.9 % (ref 22–41)
MACROCYTES BLD QL SMEAR: ABNORMAL
MAGNESIUM SERPL-MCNC: 1.3 MG/DL (ref 1.5–2.5)
MCH RBC QN AUTO: 28.9 PG (ref 27–33)
MCHC RBC AUTO-ENTMCNC: 32.4 G/DL (ref 33.7–35.3)
MCV RBC AUTO: 89.1 FL (ref 81.4–97.8)
MONOCYTES # BLD AUTO: 0.62 K/UL (ref 0–0.85)
MONOCYTES NFR BLD AUTO: 9 % (ref 0–13.4)
MORPHOLOGY BLD-IMP: NORMAL
NEUTROPHILS # BLD AUTO: 4.33 K/UL (ref 1.82–7.42)
NEUTROPHILS NFR BLD: 62.7 % (ref 44–72)
NRBC # BLD AUTO: 0 K/UL
NRBC BLD-RTO: 0 /100 WBC
OVALOCYTES BLD QL SMEAR: NORMAL
PLATELET # BLD AUTO: 74 K/UL (ref 164–446)
PLATELET BLD QL SMEAR: NORMAL
PMV BLD AUTO: 11 FL (ref 9–12.9)
POIKILOCYTOSIS BLD QL SMEAR: NORMAL
POLYCHROMASIA BLD QL SMEAR: NORMAL
POTASSIUM SERPL-SCNC: 4 MMOL/L (ref 3.6–5.5)
PROT SERPL-MCNC: 5.7 G/DL (ref 6–8.2)
RBC # BLD AUTO: 3.29 M/UL (ref 4.7–6.1)
RBC BLD AUTO: PRESENT
SODIUM SERPL-SCNC: 139 MMOL/L (ref 135–145)
TARGETS BLD QL SMEAR: NORMAL
TRIGL SERPL-MCNC: 110 MG/DL (ref 0–149)
WBC # BLD AUTO: 6.9 K/UL (ref 4.8–10.8)

## 2020-06-16 PROCEDURE — 83735 ASSAY OF MAGNESIUM: CPT

## 2020-06-16 PROCEDURE — 700111 HCHG RX REV CODE 636 W/ 250 OVERRIDE (IP): Performed by: INTERNAL MEDICINE

## 2020-06-16 PROCEDURE — 85018 HEMOGLOBIN: CPT

## 2020-06-16 PROCEDURE — A9270 NON-COVERED ITEM OR SERVICE: HCPCS | Performed by: INTERNAL MEDICINE

## 2020-06-16 PROCEDURE — A9270 NON-COVERED ITEM OR SERVICE: HCPCS | Performed by: HOSPITALIST

## 2020-06-16 PROCEDURE — 80061 LIPID PANEL: CPT

## 2020-06-16 PROCEDURE — 700105 HCHG RX REV CODE 258: Performed by: HOSPITALIST

## 2020-06-16 PROCEDURE — 82140 ASSAY OF AMMONIA: CPT

## 2020-06-16 PROCEDURE — 85025 COMPLETE CBC W/AUTO DIFF WBC: CPT

## 2020-06-16 PROCEDURE — 770020 HCHG ROOM/CARE - TELE (206)

## 2020-06-16 PROCEDURE — 700111 HCHG RX REV CODE 636 W/ 250 OVERRIDE (IP): Performed by: HOSPITALIST

## 2020-06-16 PROCEDURE — 99233 SBSQ HOSP IP/OBS HIGH 50: CPT | Performed by: HOSPITALIST

## 2020-06-16 PROCEDURE — 700102 HCHG RX REV CODE 250 W/ 637 OVERRIDE(OP): Performed by: INTERNAL MEDICINE

## 2020-06-16 PROCEDURE — 700111 HCHG RX REV CODE 636 W/ 250 OVERRIDE (IP): Performed by: EMERGENCY MEDICINE

## 2020-06-16 PROCEDURE — 76705 ECHO EXAM OF ABDOMEN: CPT

## 2020-06-16 PROCEDURE — 700101 HCHG RX REV CODE 250: Performed by: HOSPITALIST

## 2020-06-16 PROCEDURE — 36415 COLL VENOUS BLD VENIPUNCTURE: CPT

## 2020-06-16 PROCEDURE — C9113 INJ PANTOPRAZOLE SODIUM, VIA: HCPCS | Performed by: EMERGENCY MEDICINE

## 2020-06-16 PROCEDURE — 700105 HCHG RX REV CODE 258: Performed by: EMERGENCY MEDICINE

## 2020-06-16 PROCEDURE — 700102 HCHG RX REV CODE 250 W/ 637 OVERRIDE(OP): Performed by: HOSPITALIST

## 2020-06-16 PROCEDURE — 80053 COMPREHEN METABOLIC PANEL: CPT

## 2020-06-16 RX ORDER — ZOLPIDEM TARTRATE 5 MG/1
5 TABLET ORAL NIGHTLY PRN
Status: DISCONTINUED | OUTPATIENT
Start: 2020-06-16 | End: 2020-06-17 | Stop reason: HOSPADM

## 2020-06-16 RX ORDER — DEXTROSE MONOHYDRATE, SODIUM CHLORIDE, AND POTASSIUM CHLORIDE 50; 1.49; 4.5 G/1000ML; G/1000ML; G/1000ML
INJECTION, SOLUTION INTRAVENOUS CONTINUOUS
Status: DISCONTINUED | OUTPATIENT
Start: 2020-06-16 | End: 2020-06-17 | Stop reason: HOSPADM

## 2020-06-16 RX ORDER — FOLIC ACID 1 MG/1
1 TABLET ORAL DAILY
Status: DISCONTINUED | OUTPATIENT
Start: 2020-06-16 | End: 2020-06-17 | Stop reason: HOSPADM

## 2020-06-16 RX ORDER — MAGNESIUM SULFATE HEPTAHYDRATE 40 MG/ML
2 INJECTION, SOLUTION INTRAVENOUS ONCE
Status: COMPLETED | OUTPATIENT
Start: 2020-06-16 | End: 2020-06-16

## 2020-06-16 RX ORDER — THIAMINE MONONITRATE (VIT B1) 100 MG
100 TABLET ORAL DAILY
Status: DISCONTINUED | OUTPATIENT
Start: 2020-06-16 | End: 2020-06-17 | Stop reason: HOSPADM

## 2020-06-16 RX ADMIN — SODIUM CHLORIDE 8 MG/HR: 9 INJECTION, SOLUTION INTRAVENOUS at 13:26

## 2020-06-16 RX ADMIN — SODIUM CHLORIDE 8 MG/HR: 9 INJECTION, SOLUTION INTRAVENOUS at 23:14

## 2020-06-16 RX ADMIN — POTASSIUM CHLORIDE, DEXTROSE MONOHYDRATE AND SODIUM CHLORIDE: 150; 5; 450 INJECTION, SOLUTION INTRAVENOUS at 16:41

## 2020-06-16 RX ADMIN — CEFTRIAXONE SODIUM 1 G: 1 INJECTION, POWDER, FOR SOLUTION INTRAMUSCULAR; INTRAVENOUS at 05:25

## 2020-06-16 RX ADMIN — MAGNESIUM SULFATE IN WATER 2 G: 40 INJECTION, SOLUTION INTRAVENOUS at 05:25

## 2020-06-16 RX ADMIN — FOLIC ACID 1 MG: 1 TABLET ORAL at 16:41

## 2020-06-16 RX ADMIN — Medication 100 MG: at 16:41

## 2020-06-16 RX ADMIN — THERA TABS 1 TABLET: TAB at 16:41

## 2020-06-16 RX ADMIN — ZOLPIDEM TARTRATE 5 MG: 5 TABLET ORAL at 21:13

## 2020-06-16 RX ADMIN — SODIUM CHLORIDE 8 MG/HR: 9 INJECTION, SOLUTION INTRAVENOUS at 03:37

## 2020-06-16 RX ADMIN — OCTREOTIDE ACETATE 50 MCG/HR: 200 INJECTION, SOLUTION INTRAVENOUS; SUBCUTANEOUS at 19:53

## 2020-06-16 RX ADMIN — POTASSIUM CHLORIDE, DEXTROSE MONOHYDRATE AND SODIUM CHLORIDE: 150; 5; 450 INJECTION, SOLUTION INTRAVENOUS at 23:14

## 2020-06-16 ASSESSMENT — ENCOUNTER SYMPTOMS: ROS GI COMMENTS: BLACK STOOLS

## 2020-06-16 ASSESSMENT — FIBROSIS 4 INDEX: FIB4 SCORE: 8.5

## 2020-06-16 NOTE — PROGRESS NOTES
Bedside shift report received from day shift RN. Assumed care of pt. Pt A&Ox4. Denies any pain or SOB at this time. Complains of mild nausea and feeling bloated. Tele monitor on. Pt resting in bed with call light within reach. Bed locked and in lowest position.

## 2020-06-16 NOTE — PROGRESS NOTES
Bedside report received, pt care assumed, tele box on. VSS, pt assessment complete. Pt aaox4, no signs of distress noted at this time. POC discussed with pt and verbalizes no questions. Pt denies any additional needs at this time. Bed in lowest position, pt educated on fall risk and verbalized understanding, call light within reach.

## 2020-06-16 NOTE — CARE PLAN
Problem: Communication  Goal: The ability to communicate needs accurately and effectively will improve  Outcome: PROGRESSING AS EXPECTED  Intervention: Reorient patient to environment as needed  Flowsheets (Taken 6/16/2020 0440)  Oriented to::   Call Light & Bedside Controls   Bedside Report   Unit Routine     Problem: Safety  Goal: Will remain free from falls  Outcome: PROGRESSING AS EXPECTED  Intervention: Implement fall precautions  Flowsheets (Taken 6/15/2020 2000)  Environmental Precautions:   Treaded Slipper Socks on Patient   Personal Belongings, Wastebasket, Call Bell etc. in Easy Reach   Bed in Low Position

## 2020-06-16 NOTE — PROGRESS NOTES
Taking over this patient mid-shift. Patient resting in bed on RA. All questions answered. Will continue to monitor.

## 2020-06-16 NOTE — ASSESSMENT & PLAN NOTE
With hematemesis and melena, likely upper GI source  Continuous cardiac monitoring  Patient has been started on IV fluid hydration with lactated ringer  NPO  Patient is started on IV Protonix octreotide, ceftriaxone  Monitor H&H every 8 hours, transfuse for hemoglobin less than 7  Coagulation studies within normal limits-we will avoid vitamin K for now  GI has been consulted, awaiting input.  CLD 6/18 @ 4pm given report from nurse that endoscopy lab is completely booked today.  N.p.o. at midnight.

## 2020-06-16 NOTE — PROGRESS NOTES
2 RN skin check complete with ANA Deal.     Skin assessed under devices intact.  Confirmed pressure ulcers found on N/A.  New potential pressure ulcers noted on N/A.     No open wounds or sores noted.

## 2020-06-16 NOTE — ASSESSMENT & PLAN NOTE
No signs of withdrawal continue to monitor   I would avoid Ativan in patient with cirrhosis   Thiamine, folate, MVI initiated 6/16/2020.

## 2020-06-16 NOTE — CARE PLAN
Problem: Bowel/Gastric:  Goal: Normal bowel function is maintained or improved  Outcome: PROGRESSING AS EXPECTED  Note: Patient has not had a bloody stool in over 12 hours, not hematemesis either. Advancing to a clear liquid diet.      Problem: Knowledge Deficit  Goal: Knowledge of disease process/condition, treatment plan, diagnostic tests, and medications will improve  Outcome: PROGRESSING AS EXPECTED  Note: Pt educated about disease process. Reason why medications are taken. And informed about treatment plan.

## 2020-06-16 NOTE — ASSESSMENT & PLAN NOTE
The patient has the intention to quit smoking. Nicotine replacement protocol will be provided to the patient.

## 2020-06-16 NOTE — H&P
Hospital Medicine History & Physical Note    Date of Service  6/15/2020    Primary Care Physician  REMIGIO Duarte.    Code Status  Full Code    Chief Complaint  Chief Complaint   Patient presents with   • Blood in Vomit   • Rectal Bleeding       History of Presenting Illness  45 y.o. male who presented 6/15/2020 with medical history of cirrhosis, peptic ulcer disease, esophageal varices who comes into the emergency room after having hematemesis.  Patient states that he had 2 episodes at [facility] and 2 episodes here at [facility].  Associated with melena that started happening yesterday.  He has a history of alcohol abuse and his last drink was yesterday.  He drinks 1-2 shots of hard liquor daily.  Patient denies any lightheadedness or dizziness but does have significant months of nausea.  She denies any fever, cough, shortness of breath.  Patient does take naproxen at home.    Patient presented to [facility] and when he arrived his blood pressure was 146/68, heart rate 118  EKG found sinus tachycardia  WBC 6.6, hemoglobin 12.1, platelets 81, sodium 139, potassium 4.2, chloride 104, CO2 23, glucose 129, creatinine 1.6, calcium 8.3, T bili 7.1, AST 89, ALT 33, lipase 111,      Review of Systems  Review of Systems   Constitutional: Negative for chills, diaphoresis, fever and malaise/fatigue.   HENT: Negative for congestion, ear discharge, ear pain, hearing loss, nosebleeds, sinus pain, sore throat and tinnitus.    Eyes: Negative for blurred vision, double vision, photophobia and pain.   Respiratory: Negative for cough, hemoptysis, sputum production, shortness of breath, wheezing and stridor.    Cardiovascular: Negative for chest pain, palpitations, orthopnea, claudication, leg swelling and PND.   Gastrointestinal: Positive for blood in stool, melena, nausea and vomiting. Negative for abdominal pain, constipation, diarrhea and heartburn.   Genitourinary: Negative for dysuria, flank  pain, frequency, hematuria and urgency.   Musculoskeletal: Negative for back pain, falls, joint pain, myalgias and neck pain.   Skin: Negative for itching and rash.   Neurological: Negative for dizziness, tingling, tremors, weakness and headaches.   Endo/Heme/Allergies: Negative for environmental allergies and polydipsia. Does not bruise/bleed easily.   Psychiatric/Behavioral: Negative for depression, hallucinations, substance abuse and suicidal ideas.       Past Medical History  Medical history of alcoholic cirrhosis, peptic ulcers    Surgical History  Surgical history is reviewed and not pertinent    Family History  family history includes Diabetes in his father; Heart Attack in his maternal grandfather; Lung Disease in his mother; Other in his brother, brother, and mother.     Social History   reports that he has been smoking cigarettes. He has a 20.00 pack-year smoking history. He quit smokeless tobacco use about 8 years ago.  His smokeless tobacco use included chew. He reports previous alcohol use of about 21.0 oz of alcohol per week. He reports current drug use. Drug: Marijuana.    Allergies  Allergies   Allergen Reactions   • Nkda [No Known Drug Allergy]        Medications  Prior to Admission Medications   Prescriptions Last Dose Informant Patient Reported? Taking?   naproxen (ANAPROX) 220 MG tablet FEW DAYS AGO at PRN Patient Yes Yes   Sig: Take 220 mg by mouth 1 time daily as needed.      Facility-Administered Medications: None       Physical Exam  Temp:  [36.8 °C (98.2 °F)-37.3 °C (99.1 °F)] 37.2 °C (98.9 °F)  Pulse:  [] 99  Resp:  [16-20] 16  BP: (127-148)/(59-84) 144/84  SpO2:  [94 %-97 %] 96 %    Physical Exam  Vitals signs and nursing note reviewed.   Constitutional:       General: He is not in acute distress.     Appearance: Normal appearance. He is not ill-appearing, toxic-appearing or diaphoretic.   HENT:      Head: Normocephalic and atraumatic.      Nose: No congestion or rhinorrhea.       Mouth/Throat:      Pharynx: No oropharyngeal exudate or posterior oropharyngeal erythema.   Eyes:      General: Scleral icterus present.   Neck:      Musculoskeletal: No neck rigidity or muscular tenderness.      Vascular: No carotid bruit.   Cardiovascular:      Rate and Rhythm: Normal rate and regular rhythm.      Pulses: Normal pulses.      Heart sounds: Normal heart sounds. No murmur. No friction rub. No gallop.    Pulmonary:      Effort: Pulmonary effort is normal. No respiratory distress.      Breath sounds: Normal breath sounds. No stridor. No wheezing or rhonchi.   Abdominal:      General: Abdomen is flat. There is distension.      Palpations: There is no mass.      Tenderness: There is no abdominal tenderness. There is no left CVA tenderness, guarding or rebound.      Hernia: No hernia is present.   Musculoskeletal: Normal range of motion.         General: No swelling.      Right lower leg: No edema.      Left lower leg: No edema.   Lymphadenopathy:      Cervical: No cervical adenopathy.   Skin:     General: Skin is warm and dry.      Capillary Refill: Capillary refill takes more than 3 seconds.      Coloration: Skin is not jaundiced or pale.      Findings: No bruising or erythema.   Neurological:      Mental Status: He is alert.         Laboratory:  Recent Labs     06/15/20  1515 06/15/20  1725   WBC 7.6  --    RBC 3.88*  --    HEMOGLOBIN 11.2* 10.3*   HEMATOCRIT 34.8*  --    MCV 89.7  --    MCH 28.9  --    MCHC 32.2*  --    RDW 70.5*  --    PLATELETCT 82*  --    MPV 10.9  --      Recent Labs     06/15/20  1515   SODIUM 140   POTASSIUM 4.1   CHLORIDE 106   CO2 19*   GLUCOSE 127*   BUN 11   CREATININE 0.33*   CALCIUM 8.1*     Recent Labs     06/15/20  1515   ALTSGPT 24   ASTSGOT 77*   ALKPHOSPHAT 178*   TBILIRUBIN 7.1*   GLUCOSE 127*     Recent Labs     06/15/20  1515   APTT 43.2*   INR 1.83*     No results for input(s): NTPROBNP in the last 72 hours.      No results for input(s): TROPONINT in the last 72  hours.    Imaging:  US-RUQ    (Results Pending)         Assessment/Plan:    * Gastrointestinal hemorrhage with hematemesis  Assessment & Plan  With hematemesis and melena, likely upper GI source  Continuous cardiac monitoring  Patient has been started on IV fluid hydration with lactated ringer  NPO  Patient is started on IV Protonix octreotide, ceftriaxone  Monitor H&H every 8 hours, transfuse for hemoglobin less than 7  Coagulation studies within normal limits-we will avoid vitamin K for now  GI has been consulted      Alcoholic cirrhosis of liver without ascites (HCC)  Assessment & Plan  Decompensated  Meld 21  Check ammonia  Check right upper quadrant ultrasound    Tobacco abuse  Assessment & Plan  Tobacco cessation education provided for more than 5 minutes.  We discussed the risks of smoking including increased risk of heart disease, stroke, cancer and COPD. We discussed the benefits of quitting smoking. We discussed options of nicotine patch, wellbutrin and chantix.  The patient has the intention to quit smoking. Nicotine replacement protocol will be provided to the patient.  I advised patient that his peptic ulcers are not can heal if he continues to smoke    Alcohol abuse- (present on admission)  Assessment & Plan  No signs of withdrawal continue to monitor   I would avoid Ativan in patient with cirrhosis      I spent a total of 35 minutes of non face to face time performing additional research, reviewing medical records from transferring facility, discussing plan of care with other healthcare providers. Start time: 3 45 pm. End time: 4:20 pm

## 2020-06-16 NOTE — PROGRESS NOTES
Patient angry about not being able to eat. GI has not consulted yet. Endoscopy was called and they are booked solid for today regardless. Will ask MD about getting a diet ordered and NPO at midnight.

## 2020-06-16 NOTE — PROGRESS NOTES
[facility] Medicine Daily Progress Note    Date of Service  6/16/2020    Chief Complaint  45 y.o. male admitted 6/15/2020 with black stools concerning for upper GI bleed.    Hospital Course    Gastroenterology has been consulted per the admitting physician, awaiting their input.  Per report from GI lab they are completely booked today, there is no chance that he will be going today, serial H&H stable, will allow a clear liquid diet at this time.      Interval Problem Update  Serial hemoglobin stable, continue treatment per below.    Consultants/Specialty  Gastroenterology    Code Status  Full    Disposition  Pending outcomes of EGD.    Review of Systems  Review of Systems   Gastrointestinal:        Black stools   All other systems reviewed and are negative.       Physical Exam  Temp:  [36.2 °C (97.1 °F)-37.2 °C (98.9 °F)] 36.2 °C (97.1 °F)  Pulse:  [] 94  Resp:  [16-20] 16  BP: (127-144)/(59-84) 138/74  SpO2:  [92 %-97 %] 92 %    Physical Exam    Fluids    Intake/Output Summary (Last 24 hours) at 6/16/2020 1600  Last data filed at 6/15/2020 1830  Gross per 24 hour   Intake 1000 ml   Output 100 ml   Net 900 ml       Current Facility-Administered Medications:   •  dextrose 5 % and 0.45 % NaCl with KCl 20 mEq, , Intravenous, Continuous, Samy Caraballo M.D.  •  octreotide (SANDOSTATIN) 1,250 mcg in  mL Infusion, 50 mcg/hr, Intravenous, Continuous, Juan Franciscowyatt Rhoadesl, D.O., Last Rate: 10 mL/hr at 06/15/20 1807, 50 mcg/hr at 06/15/20 1807  •  pantoprazole (PROTONIX) 80 mg in  mL Infusion, 8 mg/hr, Intravenous, Continuous, Juan Francisco Rhoadesl, D.O., Last Rate: 25 mL/hr at 06/16/20 1326, 8 mg/hr at 06/16/20 1326  •  Respiratory Therapy Consult, , Nebulization, Continuous RT, Luzma Doan M.D.  •  ondansetron (ZOFRAN) syringe/vial injection 4 mg, 4 mg, Intravenous, Q4HRS PRN, Luzma Doan M.D.  •  ondansetron (ZOFRAN ODT) dispertab 4 mg, 4 mg, Oral, Q4HRS PRN, Luzma Doan M.D., 4 mg at 06/15/20 1824  •   promethazine (PHENERGAN) tablet 12.5-25 mg, 12.5-25 mg, Oral, Q4HRS PRN, Luzma Doan M.D.  •  promethazine (PHENERGAN) suppository 12.5-25 mg, 12.5-25 mg, Rectal, Q4HRS PRN, Luzma Doan M.D.  •  prochlorperazine (COMPAZINE) injection 5-10 mg, 5-10 mg, Intravenous, Q4HRS PRN, Luzma Doan M.D.  •  Pharmacy Consult Request, , Other, PHARMACY TO DOSE, Luzma Doan M.D.  •  cefTRIAXone (ROCEPHIN) 1 g in  mL IVPB, 1 g, Intravenous, Q24HRS, Luzma Doan M.D., Stopped at 06/16/20 0555      Laboratory  Recent Labs     06/15/20  1515 06/15/20  1725 06/16/20  0037 06/16/20  0925   WBC 7.6  --  6.9  --    RBC 3.88*  --  3.29*  --    HEMOGLOBIN 11.2* 10.3* 9.5* 9.5*   HEMATOCRIT 34.8*  --  29.3*  --    MCV 89.7  --  89.1  --    MCH 28.9  --  28.9  --    MCHC 32.2*  --  32.4*  --    RDW 70.5*  --  70.4*  --    PLATELETCT 82*  --  74*  --    MPV 10.9  --  11.0  --      Recent Labs     06/15/20  1515 06/16/20  0037   SODIUM 140 139   POTASSIUM 4.1 4.0   CHLORIDE 106 108   CO2 19* 22   GLUCOSE 127* 105*   BUN 11 15   CREATININE 0.33* 0.38*   CALCIUM 8.1* 7.9*     Recent Labs     06/15/20  1515   APTT 43.2*   INR 1.83*         Recent Labs     06/16/20  0037   TRIGLYCERIDE 110   HDL 28*   LDL 57       Imaging  US-RUQ   Final Result         1.  Hepatomegaly and echogenic liver, compatible with fatty change versus fibrosis   2.  Gallbladder distention with borderline gallbladder wall thickening, findings concerning for acalculous cholecystitis. Could be further evaluated with HIDA scan.           Assessment/Plan  * Gastrointestinal hemorrhage with hematemesis  Assessment & Plan  With hematemesis and melena, likely upper GI source  Continuous cardiac monitoring  Patient has been started on IV fluid hydration with lactated ringer  NPO  Patient is started on IV Protonix octreotide, ceftriaxone  Monitor H&H every 8 hours, transfuse for hemoglobin less than 7  Coagulation studies within normal limits-we will avoid vitamin K for  now  GI has been consulted, awaiting input.  CLD 6/18 @ 4pm given report from nurse that endoscopy lab is completely booked today.  N.p.o. at midnight.    Alcoholic cirrhosis of liver without ascites (HCC)  Assessment & Plan  Decompensated  Meld 21  Ammonia WNL.   RUQ US per above.     Tobacco abuse  Assessment & Plan  The patient has the intention to quit smoking. Nicotine replacement protocol will be provided to the patient.      Alcohol abuse- (present on admission)  Assessment & Plan  No signs of withdrawal continue to monitor   I would avoid Ativan in patient with cirrhosis   Thiamine, folate, MVI initiated 6/16/2020.       VTE prophylaxis: SCDs

## 2020-06-17 VITALS
TEMPERATURE: 98 F | RESPIRATION RATE: 18 BRPM | WEIGHT: 208.78 LBS | SYSTOLIC BLOOD PRESSURE: 128 MMHG | HEART RATE: 73 BPM | BODY MASS INDEX: 28.28 KG/M2 | HEIGHT: 72 IN | DIASTOLIC BLOOD PRESSURE: 80 MMHG | OXYGEN SATURATION: 96 %

## 2020-06-17 LAB
HGB BLD-MCNC: 8.8 G/DL (ref 14–18)
HGB BLD-MCNC: 9.6 G/DL (ref 14–18)

## 2020-06-17 PROCEDURE — 700105 HCHG RX REV CODE 258: Performed by: HOSPITALIST

## 2020-06-17 PROCEDURE — 85018 HEMOGLOBIN: CPT

## 2020-06-17 PROCEDURE — 700111 HCHG RX REV CODE 636 W/ 250 OVERRIDE (IP): Performed by: HOSPITALIST

## 2020-06-17 PROCEDURE — A9270 NON-COVERED ITEM OR SERVICE: HCPCS | Performed by: HOSPITALIST

## 2020-06-17 PROCEDURE — 36415 COLL VENOUS BLD VENIPUNCTURE: CPT

## 2020-06-17 PROCEDURE — 700101 HCHG RX REV CODE 250: Performed by: HOSPITALIST

## 2020-06-17 PROCEDURE — 700102 HCHG RX REV CODE 250 W/ 637 OVERRIDE(OP): Performed by: HOSPITALIST

## 2020-06-17 PROCEDURE — 99239 HOSP IP/OBS DSCHRG MGMT >30: CPT | Performed by: HOSPITALIST

## 2020-06-17 RX ORDER — PANTOPRAZOLE SODIUM 40 MG/1
40 TABLET, DELAYED RELEASE ORAL DAILY
Qty: 90 TAB | Refills: 0 | Status: SHIPPED | OUTPATIENT
Start: 2020-06-17 | End: 2021-11-09

## 2020-06-17 RX ADMIN — Medication 100 MG: at 05:53

## 2020-06-17 RX ADMIN — CEFTRIAXONE SODIUM 1 G: 1 INJECTION, POWDER, FOR SOLUTION INTRAMUSCULAR; INTRAVENOUS at 05:53

## 2020-06-17 RX ADMIN — POTASSIUM CHLORIDE, DEXTROSE MONOHYDRATE AND SODIUM CHLORIDE: 150; 5; 450 INJECTION, SOLUTION INTRAVENOUS at 09:14

## 2020-06-17 RX ADMIN — FOLIC ACID 1 MG: 1 TABLET ORAL at 05:53

## 2020-06-17 RX ADMIN — THERA TABS 1 TABLET: TAB at 05:53

## 2020-06-17 NOTE — CONSULTS
Gastroenterology Consult Note     Date of Consult: 2020     Reason for consult: GI Bleeding     HPI: 45-year-old male admitted to the hospital with history of coffee-ground emesis and melena 2 days ago.  Patient's initial hemoglobin was 9.6 which remained stable throughout his hospital stay.  He did not have any further episodes of emesis.  His stool was black but he did not have any bowel movement since yesterday.  Patient has history of upper GI bleeding more than a year ago and underwent EGD, the details are not available for review.  He also has history of chronic liver disease due to alcohol.  He used to be a heavy drinker but cut back to drinking only couple of days a week since his endoscopy last year.  He denies taking NSAIDs.  His labs were otherwise significant for mild elevation of AST.  CBC revealed thrombocytopenia with a platelet count of 74.     PMHX: Upper GI bleeding 1 year ago       PSurgHx: Esophagogastroduodenoscopy 1 year ago.     ALLERGIES:Nkda [no known drug allergy]    No current facility-administered medications on file prior to encounter.      No current outpatient medications on file prior to encounter.        SocHx:   Social History     Socioeconomic History   • Marital status:      Spouse name: Not on file   • Number of children: Not on file   • Years of education: Not on file   • Highest education level: Not on file   Occupational History   • Not on file   Social Needs   • Financial resource strain: Not on file   • Food insecurity     Worry: Not on file     Inability: Not on file   • Transportation needs     Medical: Not on file     Non-medical: Not on file   Tobacco Use   • Smoking status: Current Every Day Smoker     Packs/day: 1.00     Years: 20.00     Pack years: 20.00     Types: Cigarettes     Last attempt to quit: 3/3/2013     Years since quittin.2   • Smokeless tobacco: Former User     Types: Chew     Quit date: 3/4/2012    • Tobacco comment: chewed for 10 years   Substance and Sexual Activity   • Alcohol use: Not Currently     Alcohol/week: 21.0 oz     Types: 42 Cans of beer per week   • Drug use: Yes     Types: Marijuana     Comment: at night prn   • Sexual activity: Yes     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: , printer   Lifestyle   • Physical activity     Days per week: Not on file     Minutes per session: Not on file   • Stress: Not on file   Relationships   • Social connections     Talks on phone: Not on file     Gets together: Not on file     Attends [employer] service: Not on file     Active member of club or organization: Not on file     Attends meetings of clubs or organizations: Not on file     Relationship status: Not on file   • Intimate partner violence     Fear of current or ex partner: Not on file     Emotionally abused: Not on file     Physically abused: Not on file     Forced sexual activity: Not on file   Other Topics Concern   • Not on file   Social History Narrative   • Not on file        FAMHx:   Family History   Problem Relation Age of Onset   • Other Mother         smoker   • Lung Disease Mother         COPD   • Diabetes Father    • Other Brother         gout   • Heart Attack Maternal Grandfather    • Other Brother         epilepsy        ROS:  Constitutional: No fevers, chills, no night sweats, no weight changes  HEENT: no vision or hearing changes, no dry mouth, no change in smell  CARDIO: no palpitations, no orthopnea, no chest pain  PULM: no cough, no shortness of breath  NEURO: no Seizures, no memory impairment, no change in sensation  GI: as above  : no dysuria, no hematuria  HEME: no anemia, no easy brusing  MUSCULOSKELETAL: no muscle aches, no back pain, no arthritis  PSYCH: no anxiety or depression  SKIN: no rashes     PE:  Vitals:    06/16/20 2343 06/17/20 0421 06/17/20 0700 06/17/20 1200   BP: 120/71 141/63 115/57 128/80   Pulse: 92 62 (!) 101 73   Resp: 16 16 17 18   Temp: 37.4 °C (99.4 °F) 37 °C  (98.6 °F) 36.7 °C (98.1 °F) 36.7 °C (98 °F)   TempSrc: Temporal Temporal Temporal Temporal   SpO2: 95% 96% 90% 96%   Weight:       Height:         Gen: AAOx3, NAD, lying in bed  HEENT: PERRL, EOMI, nares patent, Mucous membranes moist, mild conjunctival icterus present  Neck: supple, no cervical or supraclavicular adenopathy  CVS: regular rhythm, normal rate, no MRG  Pulm: CTAB, no crackles  Abd: soft, Nd, NT, no guarding or rebound  Ext: no edema, normal sensation  NEURO: grossly normal, no weakness  Skin: warm, no rash  Psych: normal Affect, no anxiety     LABS:  Lab Results   Component Value Date/Time    SODIUM 139 06/16/2020 12:37 AM    POTASSIUM 4.0 06/16/2020 12:37 AM    CHLORIDE 108 06/16/2020 12:37 AM    CO2 22 06/16/2020 12:37 AM    GLUCOSE 105 (H) 06/16/2020 12:37 AM    BUN 15 06/16/2020 12:37 AM    CREATININE 0.38 (L) 06/16/2020 12:37 AM      Lab Results   Component Value Date/Time    WBC 6.9 06/16/2020 12:37 AM    RBC 3.29 (L) 06/16/2020 12:37 AM    HEMOGLOBIN 9.6 (L) 06/17/2020 09:05 AM    HEMATOCRIT 29.3 (L) 06/16/2020 12:37 AM    MCV 89.1 06/16/2020 12:37 AM    MCH 28.9 06/16/2020 12:37 AM    MCHC 32.4 (L) 06/16/2020 12:37 AM    MPV 11.0 06/16/2020 12:37 AM    NEUTSPOLYS 62.70 06/16/2020 12:37 AM    LYMPHOCYTES 24.90 06/16/2020 12:37 AM    MONOCYTES 9.00 06/16/2020 12:37 AM    EOSINOPHILS 2.30 06/16/2020 12:37 AM    BASOPHILS 1.00 06/16/2020 12:37 AM    HYPOCHROMIA 1+ 06/16/2020 12:37 AM    ANISOCYTOSIS 2+ 06/16/2020 12:37 AM        Lab Results   Component Value Date/Time    PROTHROMBTM 21.8 (H) 06/15/2020 03:15 PM    INR 1.83 (H) 06/15/2020 03:15 PM      Recent Labs     06/15/20  1515 06/16/20  0037   ASTSGOT 77* 67*   ALTSGPT 24 21   TBILIRUBIN 7.1* 7.3*   GLOBULIN 2.9 2.6   INR 1.83*  --    AMMONIA  --  95*       IMAGING: Ultrasound abdomen revealed fatty liver changes.       ASSESSMENT:   45-year-old male presenting with stable upper GI bleeding manifesting as coffee-ground emesis and melena.   Over the past 48 hours he has been stable without evidence of further bleeding.  Diagnostic considerations include peptic ulcer disease, gastritis, portal hypertension related bleeding secondary to portal hypertensive gastropathy or esophageal varices.  Severity of bleed and presentation is not consistent with variceal bleeding.  For further evaluation I have recommended proceeding with esophagogastroduodenoscopy later today.  However patient wants to go home and have outpatient endoscopy.  I  I have explained to him risk of rebleeding which could be significant massive and potentially life-threatening.  However patient is very adamant that he wants to go home today and declines endoscopy.  Patient has evidence of chronic alcoholic liver disease with possible cirrhosis based on elevated bilirubin, INR and thrombocytopenia.  Although ammonia level is elevated, he does not appear to have clinically significant hepatic encephalopathy.      PROBLEMS:  1.  Upper GI bleeding  2.  Alcoholic cirrhosis     PLAN:   1.  Recommend esophagogastroduodenoscopy as soon as possible.  2.  Continue PPI twice daily until endoscopy is completed.  3.  Further recommendations to follow based on endoscopy findings.        Thank you for this consult.

## 2020-06-17 NOTE — DISCHARGE SUMMARY
Discharge Summary    CHIEF COMPLAINT ON ADMISSION  Chief Complaint   Patient presents with   • Blood in Vomit   • Rectal Bleeding       Reason for Admission  EMS - Transfer     Admission Date  6/15/2020    CODE STATUS  Full Code    HPI & HOSPITAL COURSE  This is a 45 y.o. male here with hematemesis.  He was noted to have a significant amount of melena consistent with an upper GI source.  He has a history of chronic alcoholism.  GI was consulted for consideration of EGD at the time of admission, unfortunately there was some delay in the consult, not forthcoming within the first 24 hours of admission.  Repeat request for consultation from GI was made on 6/17/2020 at approximately 8:15 AM.  At 9:30 AM patient was seen by Dr. Fairbanks, who obtained additional oral history that patient was seen by digestive [facility] at an outside hospital, and requested that I obtain consultation from them.  Patient was seen by [facility] physician at 12:15 PM, at which point he was quite upset about the perceived delay in care, leading him to refuse further work-up with EGD.  I explained that he had been receiving care with his PPI drip, IV fluids, and octreotide drip, however he was adamant that he wanted to depart the hospital AGAINST MEDICAL ADVICE.  He does seem to have capacity and understands that there could be life-threatening consequences to not receiving immediate treatment of his life-threatening upper GI bleed, which we are ready to treat immediately at the time of his discharge.  I have noted that his most recent hemoglobin is stable/low, and will arrange for a 90-day prescription of a PPI at discharge and attempt to facilitate the safest possible outcome, in light of his decision to leave AMA.  Additionally he is provided with the phone number and addresses of [facility] so that he may receive an outpatient EGD as soon as possible.  I have cautioned him to avoid Naprosyn for the  coming month, use Tylenol instead as needed for minor aches and pains.  Furthermore, I have cautioned him to avoid any and all alcohol until he receives his outpatient EGD.    Therefore, he is discharged in fair and stable condition to home with close outpatient follow-up.    The patient met 2-midnight criteria for an inpatient stay at the time of discharge.    Discharge Date  06/17/20      FOLLOW UP ITEMS POST DISCHARGE  EGD     DISCHARGE DIAGNOSES  Principal Problem:    Gastrointestinal hemorrhage with hematemesis POA: Unknown  Active Problems:    Alcoholic cirrhosis of liver without ascites (HCC) POA: Unknown    Alcohol abuse POA: Yes    Tobacco abuse POA: Unknown  Resolved Problems:    * No resolved hospital problems. *      FOLLOW UP  No future appointments.  Joe Garcia D.O.  [address] [place] [address] [phone]    Schedule an appointment as soon as possible for a visit today  To schedule outpatient EGD.       MEDICATIONS ON DISCHARGE     Medication List      START taking these medications      Instructions   pantoprazole 40 MG Tbec  Commonly known as:  PROTONIX   Take 1 Tab by mouth every day.  Dose:  40 mg        STOP taking these medications    naproxen 220 MG tablet  Commonly known as:  ANAPROX            Allergies  Allergies   Allergen Reactions   • Nkda [No Known Drug Allergy]        DIET  Orders Placed This Encounter   Procedures   • Diet NPO     Standing Status:   Standing     Number of Occurrences:   8     Order Specific Question:   Restrict to:     Answer:   Sips with Medications [3]       ACTIVITY  As tolerated.  Weight bearing as tolerated    CONSULTATIONS  GI     PROCEDURES  None    LABORATORY  Lab Results   Component Value Date    SODIUM 139 06/16/2020    POTASSIUM 4.0 06/16/2020    CHLORIDE 108 06/16/2020    CO2 22 06/16/2020    GLUCOSE 105 (H) 06/16/2020    BUN 15 06/16/2020    CREATININE 0.38 (L) 06/16/2020        Lab Results   Component Value Date    WBC 6.9 06/16/2020     HEMOGLOBIN 9.6 (L) 06/17/2020    HEMATOCRIT 29.3 (L) 06/16/2020    PLATELETCT 74 (L) 06/16/2020        Total time of the discharge process exceeds 36 minutes.

## 2020-06-17 NOTE — PROGRESS NOTES
Enrique Drake patient has chosen to leave the hospital against medical advice. The attending physician has not discharged the patient. Patient is not a risk to himself or others. I have discussed with the patient the following:  Physician has not determined patient is ready for discharge, Risks and consequences of leaving the hospital too soon and Benefit of continued hospitalization.      Discharge against medical advice form has been Patient left abruptly - no chance to sign.      Attending physician has been notified.

## 2020-06-17 NOTE — PROGRESS NOTES
Bedside shift report received from day shift RN. Assumed care of pt. Pt A&Ox4. Denies any pain, nausea, or SOB at this time. Tele monitor on. Pt resting in bed with call light within reach. Bed locked and in lowest position. No reports of hematemesis or bloody stools since early this morning

## 2020-11-09 ENCOUNTER — HOSPITAL ENCOUNTER (EMERGENCY)
Facility: MEDICAL CENTER | Age: 45
End: 2020-11-09
Attending: EMERGENCY MEDICINE
Payer: COMMERCIAL

## 2020-11-09 VITALS
HEART RATE: 94 BPM | HEIGHT: 72 IN | RESPIRATION RATE: 20 BRPM | WEIGHT: 200 LBS | OXYGEN SATURATION: 97 % | BODY MASS INDEX: 27.09 KG/M2 | SYSTOLIC BLOOD PRESSURE: 147 MMHG | TEMPERATURE: 97.6 F | DIASTOLIC BLOOD PRESSURE: 81 MMHG

## 2020-11-09 DIAGNOSIS — B34.9 VIRAL SYNDROME: ICD-10-CM

## 2020-11-09 DIAGNOSIS — R11.2 NON-INTRACTABLE VOMITING WITH NAUSEA, UNSPECIFIED VOMITING TYPE: ICD-10-CM

## 2020-11-09 LAB
ANION GAP SERPL CALC-SCNC: 13 MMOL/L (ref 7–16)
BASOPHILS # BLD AUTO: 1.4 % (ref 0–1.8)
BASOPHILS # BLD: 0.07 K/UL (ref 0–0.12)
BUN SERPL-MCNC: 4 MG/DL (ref 8–22)
CALCIUM SERPL-MCNC: 9.2 MG/DL (ref 8.5–10.5)
CHLORIDE SERPL-SCNC: 102 MMOL/L (ref 96–112)
CO2 SERPL-SCNC: 22 MMOL/L (ref 20–33)
COVID ORDER STATUS COVID19: NORMAL
CREAT SERPL-MCNC: 0.44 MG/DL (ref 0.5–1.4)
EOSINOPHIL # BLD AUTO: 0.07 K/UL (ref 0–0.51)
EOSINOPHIL NFR BLD: 1.4 % (ref 0–6.9)
ERYTHROCYTE [DISTWIDTH] IN BLOOD BY AUTOMATED COUNT: 56.2 FL (ref 35.9–50)
GLUCOSE SERPL-MCNC: 124 MG/DL (ref 65–99)
HCT VFR BLD AUTO: 35.3 % (ref 42–52)
HGB BLD-MCNC: 10.9 G/DL (ref 14–18)
IMM GRANULOCYTES # BLD AUTO: 0.01 K/UL (ref 0–0.11)
IMM GRANULOCYTES NFR BLD AUTO: 0.2 % (ref 0–0.9)
LYMPHOCYTES # BLD AUTO: 1.28 K/UL (ref 1–4.8)
LYMPHOCYTES NFR BLD: 24.7 % (ref 22–41)
MCH RBC QN AUTO: 23.7 PG (ref 27–33)
MCHC RBC AUTO-ENTMCNC: 30.9 G/DL (ref 33.7–35.3)
MCV RBC AUTO: 76.9 FL (ref 81.4–97.8)
MONOCYTES # BLD AUTO: 0.52 K/UL (ref 0–0.85)
MONOCYTES NFR BLD AUTO: 10 % (ref 0–13.4)
NEUTROPHILS # BLD AUTO: 3.23 K/UL (ref 1.82–7.42)
NEUTROPHILS NFR BLD: 62.3 % (ref 44–72)
NRBC # BLD AUTO: 0 K/UL
NRBC BLD-RTO: 0 /100 WBC
PLATELET # BLD AUTO: 115 K/UL (ref 164–446)
PMV BLD AUTO: 10.2 FL (ref 9–12.9)
POTASSIUM SERPL-SCNC: 3.4 MMOL/L (ref 3.6–5.5)
RBC # BLD AUTO: 4.59 M/UL (ref 4.7–6.1)
SARS-COV-2 RNA RESP QL NAA+PROBE: NOTDETECTED
SODIUM SERPL-SCNC: 137 MMOL/L (ref 135–145)
SPECIMEN SOURCE: NORMAL
WBC # BLD AUTO: 5.2 K/UL (ref 4.8–10.8)

## 2020-11-09 PROCEDURE — C9803 HOPD COVID-19 SPEC COLLECT: HCPCS | Performed by: EMERGENCY MEDICINE

## 2020-11-09 PROCEDURE — 85025 COMPLETE CBC W/AUTO DIFF WBC: CPT

## 2020-11-09 PROCEDURE — 700105 HCHG RX REV CODE 258: Performed by: EMERGENCY MEDICINE

## 2020-11-09 PROCEDURE — 96374 THER/PROPH/DIAG INJ IV PUSH: CPT

## 2020-11-09 PROCEDURE — U0003 INFECTIOUS AGENT DETECTION BY NUCLEIC ACID (DNA OR RNA); SEVERE ACUTE RESPIRATORY SYNDROME CORONAVIRUS 2 (SARS-COV-2) (CORONAVIRUS DISEASE [COVID-19]), AMPLIFIED PROBE TECHNIQUE, MAKING USE OF HIGH THROUGHPUT TECHNOLOGIES AS DESCRIBED BY CMS-2020-01-R: HCPCS

## 2020-11-09 PROCEDURE — 99284 EMERGENCY DEPT VISIT MOD MDM: CPT

## 2020-11-09 PROCEDURE — 80048 BASIC METABOLIC PNL TOTAL CA: CPT

## 2020-11-09 PROCEDURE — 700111 HCHG RX REV CODE 636 W/ 250 OVERRIDE (IP): Performed by: EMERGENCY MEDICINE

## 2020-11-09 RX ORDER — ONDANSETRON 4 MG/1
4 TABLET, ORALLY DISINTEGRATING ORAL EVERY 6 HOURS PRN
Qty: 10 TAB | Refills: 0 | Status: SHIPPED | OUTPATIENT
Start: 2020-11-09 | End: 2021-11-09

## 2020-11-09 RX ORDER — ONDANSETRON 2 MG/ML
4 INJECTION INTRAMUSCULAR; INTRAVENOUS ONCE
Status: COMPLETED | OUTPATIENT
Start: 2020-11-09 | End: 2020-11-09

## 2020-11-09 RX ORDER — SODIUM CHLORIDE 9 MG/ML
1000 INJECTION, SOLUTION INTRAVENOUS ONCE
Status: COMPLETED | OUTPATIENT
Start: 2020-11-09 | End: 2020-11-09

## 2020-11-09 RX ADMIN — ONDANSETRON 4 MG: 2 INJECTION INTRAMUSCULAR; INTRAVENOUS at 09:21

## 2020-11-09 RX ADMIN — SODIUM CHLORIDE 1000 ML: 9 INJECTION, SOLUTION INTRAVENOUS at 09:21

## 2020-11-09 ASSESSMENT — FIBROSIS 4 INDEX: FIB4 SCORE: 8.5

## 2020-11-09 NOTE — ED NOTES
Pt discharged to home. Pt was given follow up instructions and is aware that a prescription for zofran was sent to his pharmacy. Pt given instructions/information regarding isolation until Covid test is back. Pt verbalized understanding of all instructions for discharge and is ambulatory out of ED with steady gait.

## 2020-11-09 NOTE — ED TRIAGE NOTES
Chief Complaint   Patient presents with   • Chills     onset yesterday   • N/V     Pt denies resp complaints.

## 2020-11-09 NOTE — ED PROVIDER NOTES
ED Provider Note    CHIEF COMPLAINT  Chief Complaint   Patient presents with   • Chills     onset yesterday   • N/V       HPI  Enrique Drake is a 45 y.o. male who presents to the emergency department with complaint of fever, chills, abdominal upset, nausea and dry heaving.  Past medical history significant for GERD.  On Protonix.  He states that he is largely noncompliant and often forgets to take his medication.  He is an ongoing smoker as well.  He does that he had [place] [facility] wife which was diagnosed with cellulitis.  No other household family members with similar to him.  No known positive Covid contacts.  This morning due to ongoing nausea and dry heaving and decreased p.o. intake he was concerned about possible dehydration.    REVIEW OF SYSTEMS  See HPI for further details. All other systems are negative.     PAST MEDICAL HISTORY       SOCIAL HISTORY  Social History     Tobacco Use   • Smoking status: Current Every Day Smoker     Packs/day: 1.00     Years: 20.00     Pack years: 20.00     Types: Cigarettes     Last attempt to quit: 3/3/2013     Years since quittin.6   • Smokeless tobacco: Former User     Types: Chew     Quit date: 3/4/2012   • Tobacco comment: chewed for 10 years   Substance and Sexual Activity   • Alcohol use: Not Currently     Alcohol/week: 21.0 oz     Types: 42 Cans of beer per week   • Drug use: Yes     Types: Marijuana     Comment: at night prn   • Sexual activity: Yes     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  Constitutional: Alert in no apparent distress.  HENT: No signs of trauma, Bilateral external ears normal, Nose normal.   Eyes: Pupils are equal and reactive, Conjunctiva normal  Neck: Normal range of motion, No tenderness, Supple, No stridor.   Cardiovascular: Regular rate and rhythm, no murmurs.   Thorax & Lungs: Normal breath sounds, No respiratory distress, No wheezing, No chest tenderness.   Abdomen: Bowel sounds normal, Soft, No tenderness, No masses, No pulsatile masses. No peritoneal signs.  Skin: Warm, Dry, No erythema, No rash.   Extremities: Intact distal pulses, No edema, No tenderness.  Nail clubbing  Musculoskeletal: Good range of motion in all major joints. No tenderness to palpation or major deformities noted.   Neurologic: Alert , Normal motor function, Normal sensory function, No focal deficits noted.   Psychiatric: Affect normal, Judgment normal, Mood normal.       DIAGNOSTIC STUDIES / PROCEDURES        LABS  Results for orders placed or performed during the hospital encounter of 11/09/20   CBC WITH DIFFERENTIAL   Result Value Ref Range    WBC 5.2 4.8 - 10.8 K/uL    RBC 4.59 (L) 4.70 - 6.10 M/uL    Hemoglobin 10.9 (L) 14.0 - 18.0 g/dL    Hematocrit 35.3 (L) 42.0 - 52.0 %    MCV 76.9 (L) 81.4 - 97.8 fL    MCH 23.7 (L) 27.0 - 33.0 pg    MCHC 30.9 (L) 33.7 - 35.3 g/dL    RDW 56.2 (H) 35.9 - 50.0 fL    Platelet Count 115 (L) 164 - 446 K/uL    MPV 10.2 9.0 - 12.9 fL    Neutrophils-Polys 62.30 44.00 - 72.00 %    Lymphocytes 24.70 22.00 - 41.00 %    Monocytes 10.00 0.00 - 13.40 %    Eosinophils 1.40 0.00 - 6.90 %    Basophils 1.40 0.00 - 1.80 %    Immature Granulocytes 0.20 0.00 - 0.90 %    Nucleated RBC 0.00 /100 WBC    Neutrophils (Absolute) 3.23 1.82 - 7.42 K/uL    Lymphs (Absolute) 1.28 1.00 - 4.80 K/uL    Monos (Absolute) 0.52 0.00 - 0.85 K/uL    Eos (Absolute) 0.07 0.00 - 0.51 K/uL    Baso (Absolute) 0.07 0.00 - 0.12  K/uL    Immature Granulocytes (abs) 0.01 0.00 - 0.11 K/uL    NRBC (Absolute) 0.00 K/uL   BASIC METABOLIC PANEL   Result Value Ref Range    Sodium 137 135 - 145 mmol/L    Potassium 3.4 (L) 3.6 - 5.5 mmol/L    Chloride 102 96 - 112 mmol/L    Co2 22 20 - 33 mmol/L    Glucose 124 (H) 65 - 99 mg/dL    Bun 4 (L) 8 - 22 mg/dL    Creatinine 0.44 (L) 0.50 - 1.40 mg/dL    Calcium 9.2 8.5 - 10.5 mg/dL    Anion Gap 13.0 7.0 - 16.0   COVID/SARS CoV-2 PCR    Specimen: Nasopharyngeal; Respirate   Result Value Ref Range    COVID Order Status Received    ESTIMATED GFR   Result Value Ref Range    GFR If African American >60 >60 mL/min/1.73 m 2    GFR If Non African American >60 >60 mL/min/1.73 m 2           COURSE & MEDICAL DECISION MAKING  Pertinent Labs & Imaging studies reviewed. (See chart for details)  45-year-old male presented to the emerge part with above complaint.  Laboratory evaluation is reassuring.  Chronic anemia.  He states that he does not take his iron as previously prescribed.  I have additionally noted that his blood pressure has been elevated today he needs to continue to monitor this and talk about this with his primary care physician.  In the meantime I have provided him with Zofran for ongoing antiemetic needs at home.  He is understanding of diet to follow.  He is understanding return precautions.  Is understanding of need for quarantine well his Covid testing is pending.  He is also understanding return precautions here if needed.       The patient will return for worsening symptoms and is stable at the time of discharge. The patient verbalizes understanding and will comply.    FINAL IMPRESSION  1. Viral syndrome    2. Non-intractable vomiting with nausea, unspecified vomiting type            Electronically signed by: Pola Angelo M.D., 11/9/2020 9:01 AM

## 2021-02-03 ENCOUNTER — OFFICE VISIT (OUTPATIENT)
Dept: URGENT CARE | Facility: PHYSICIAN GROUP | Age: 46
End: 2021-02-03

## 2021-02-03 DIAGNOSIS — Z01.10 ENCOUNTER FOR AUDIOLOGY EVALUATION: ICD-10-CM

## 2021-02-03 DIAGNOSIS — Z02.1 PRE-EMPLOYMENT DRUG SCREENING: ICD-10-CM

## 2021-02-03 LAB
AMP AMPHETAMINE: NORMAL
BAR BARBITURATES: NORMAL
BZO BENZODIAZEPINES: NORMAL
COC COCAINE: NORMAL
INT CON NEG: NORMAL
INT CON POS: NORMAL
MDMA ECSTASY: NORMAL
MET METHAMPHETAMINES: NORMAL
MTD METHADONE: NORMAL
OPI OPIATES: NORMAL
OXY OXYCODONE: NORMAL
PCP PHENCYCLIDINE: NORMAL
POC URINE DRUG SCREEN OCDRS: NEGATIVE
THC: NORMAL

## 2021-02-03 PROCEDURE — 8916 PR CLEARANCE ONLY: Performed by: PHYSICIAN ASSISTANT

## 2021-02-03 PROCEDURE — 80305 DRUG TEST PRSMV DIR OPT OBS: CPT | Performed by: PHYSICIAN ASSISTANT

## 2021-02-03 PROCEDURE — 92552 PURE TONE AUDIOMETRY AIR: CPT | Performed by: PHYSICIAN ASSISTANT

## 2021-03-24 ENCOUNTER — HOSPITAL ENCOUNTER (OUTPATIENT)
Dept: LAB | Facility: MEDICAL CENTER | Age: 46
End: 2021-03-24
Attending: PHYSICIAN ASSISTANT
Payer: COMMERCIAL

## 2021-03-24 PROCEDURE — 80053 COMPREHEN METABOLIC PANEL: CPT

## 2021-03-24 PROCEDURE — 85025 COMPLETE CBC W/AUTO DIFF WBC: CPT

## 2021-03-24 PROCEDURE — 84403 ASSAY OF TOTAL TESTOSTERONE: CPT

## 2021-03-24 PROCEDURE — 83036 HEMOGLOBIN GLYCOSYLATED A1C: CPT

## 2021-03-24 PROCEDURE — 84402 ASSAY OF FREE TESTOSTERONE: CPT

## 2021-03-24 PROCEDURE — 84481 FREE ASSAY (FT-3): CPT

## 2021-03-24 PROCEDURE — 84443 ASSAY THYROID STIM HORMONE: CPT

## 2021-03-24 PROCEDURE — 84270 ASSAY OF SEX HORMONE GLOBUL: CPT

## 2021-03-24 PROCEDURE — 80074 ACUTE HEPATITIS PANEL: CPT

## 2021-03-24 PROCEDURE — 36415 COLL VENOUS BLD VENIPUNCTURE: CPT

## 2021-03-24 PROCEDURE — 80061 LIPID PANEL: CPT

## 2021-03-24 PROCEDURE — 84439 ASSAY OF FREE THYROXINE: CPT

## 2021-03-25 LAB
ALBUMIN SERPL BCP-MCNC: 3.2 G/DL (ref 3.2–4.9)
ALBUMIN/GLOB SERPL: 1 G/DL
ALP SERPL-CCNC: 157 U/L (ref 30–99)
ALT SERPL-CCNC: 16 U/L (ref 2–50)
ANION GAP SERPL CALC-SCNC: 10 MMOL/L (ref 7–16)
AST SERPL-CCNC: 43 U/L (ref 12–45)
BASOPHILS # BLD AUTO: 2.2 % (ref 0–1.8)
BASOPHILS # BLD: 0.13 K/UL (ref 0–0.12)
BILIRUB SERPL-MCNC: 4.5 MG/DL (ref 0.1–1.5)
BUN SERPL-MCNC: 9 MG/DL (ref 8–22)
CALCIUM SERPL-MCNC: 8.3 MG/DL (ref 8.5–10.5)
CHLORIDE SERPL-SCNC: 109 MMOL/L (ref 96–112)
CHOLEST SERPL-MCNC: 128 MG/DL (ref 100–199)
CO2 SERPL-SCNC: 22 MMOL/L (ref 20–33)
COMMENT 1642: NORMAL
CREAT SERPL-MCNC: 0.57 MG/DL (ref 0.5–1.4)
EOSINOPHIL # BLD AUTO: 0.25 K/UL (ref 0–0.51)
EOSINOPHIL NFR BLD: 4.2 % (ref 0–6.9)
ERYTHROCYTE [DISTWIDTH] IN BLOOD BY AUTOMATED COUNT: 60.2 FL (ref 35.9–50)
EST. AVERAGE GLUCOSE BLD GHB EST-MCNC: 103 MG/DL
FASTING STATUS PATIENT QL REPORTED: NORMAL
GLOBULIN SER CALC-MCNC: 3.1 G/DL (ref 1.9–3.5)
GLUCOSE SERPL-MCNC: 123 MG/DL (ref 65–99)
HAV IGM SERPL QL IA: NORMAL
HBA1C MFR BLD: 5.2 % (ref 4–5.6)
HBV CORE IGM SER QL: NORMAL
HBV SURFACE AG SER QL: NORMAL
HCT VFR BLD AUTO: 27 % (ref 42–52)
HCV AB SER QL: NORMAL
HDLC SERPL-MCNC: 54 MG/DL
HGB BLD-MCNC: 7.3 G/DL (ref 14–18)
IMM GRANULOCYTES # BLD AUTO: 0.05 K/UL (ref 0–0.11)
IMM GRANULOCYTES NFR BLD AUTO: 0.8 % (ref 0–0.9)
LDLC SERPL CALC-MCNC: 55 MG/DL
LYMPHOCYTES # BLD AUTO: 1.5 K/UL (ref 1–4.8)
LYMPHOCYTES NFR BLD: 25 % (ref 22–41)
MCH RBC QN AUTO: 19.3 PG (ref 27–33)
MCHC RBC AUTO-ENTMCNC: 27 G/DL (ref 33.7–35.3)
MCV RBC AUTO: 71.2 FL (ref 81.4–97.8)
MONOCYTES # BLD AUTO: 0.72 K/UL (ref 0–0.85)
MONOCYTES NFR BLD AUTO: 12 % (ref 0–13.4)
MORPHOLOGY BLD-IMP: NORMAL
NEUTROPHILS # BLD AUTO: 3.36 K/UL (ref 1.82–7.42)
NEUTROPHILS NFR BLD: 55.8 % (ref 44–72)
NRBC # BLD AUTO: 0.02 K/UL
NRBC BLD-RTO: 0.3 /100 WBC
POTASSIUM SERPL-SCNC: 3.9 MMOL/L (ref 3.6–5.5)
PROT SERPL-MCNC: 6.3 G/DL (ref 6–8.2)
RBC # BLD AUTO: 3.79 M/UL (ref 4.7–6.1)
SODIUM SERPL-SCNC: 141 MMOL/L (ref 135–145)
T3FREE SERPL-MCNC: 2.71 PG/ML (ref 2–4.4)
T4 FREE SERPL-MCNC: 1.35 NG/DL (ref 0.93–1.7)
TRIGL SERPL-MCNC: 93 MG/DL (ref 0–149)
TSH SERPL DL<=0.005 MIU/L-ACNC: 1.62 UIU/ML (ref 0.38–5.33)
WBC # BLD AUTO: 6 K/UL (ref 4.8–10.8)

## 2021-03-26 ENCOUNTER — HOSPITAL ENCOUNTER (INPATIENT)
Facility: MEDICAL CENTER | Age: 46
LOS: 3 days | DRG: 894 | End: 2021-03-30
Attending: EMERGENCY MEDICINE | Admitting: INTERNAL MEDICINE
Payer: COMMERCIAL

## 2021-03-26 DIAGNOSIS — D64.9 SYMPTOMATIC ANEMIA: ICD-10-CM

## 2021-03-26 DIAGNOSIS — R41.0 CONFUSION: ICD-10-CM

## 2021-03-26 DIAGNOSIS — R14.0 ABDOMINAL DISTENTION: ICD-10-CM

## 2021-03-26 DIAGNOSIS — K76.82 ACUTE HEPATIC ENCEPHALOPATHY (HCC): ICD-10-CM

## 2021-03-26 DIAGNOSIS — R60.0 BILATERAL LOWER EXTREMITY EDEMA: ICD-10-CM

## 2021-03-26 DIAGNOSIS — I48.3 TYPICAL ATRIAL FLUTTER (HCC): ICD-10-CM

## 2021-03-26 DIAGNOSIS — R17 JAUNDICE: ICD-10-CM

## 2021-03-26 DIAGNOSIS — K70.9 ALCOHOLIC LIVER DISEASE (HCC): ICD-10-CM

## 2021-03-26 LAB
EKG IMPRESSION: NORMAL
SHBG SERPL-SCNC: 51 NMOL/L (ref 11–80)
TESTOST FREE MFR SERPL: 1.3 % (ref 1.6–2.9)
TESTOST FREE SERPL-MCNC: 20 PG/ML (ref 47–244)
TESTOST SERPL-MCNC: 153 NG/DL (ref 300–890)

## 2021-03-26 PROCEDURE — 93005 ELECTROCARDIOGRAM TRACING: CPT

## 2021-03-26 PROCEDURE — 85610 PROTHROMBIN TIME: CPT

## 2021-03-26 PROCEDURE — 84443 ASSAY THYROID STIM HORMONE: CPT

## 2021-03-26 PROCEDURE — 85730 THROMBOPLASTIN TIME PARTIAL: CPT

## 2021-03-26 PROCEDURE — 83690 ASSAY OF LIPASE: CPT

## 2021-03-26 PROCEDURE — 82728 ASSAY OF FERRITIN: CPT

## 2021-03-26 PROCEDURE — 93005 ELECTROCARDIOGRAM TRACING: CPT | Performed by: EMERGENCY MEDICINE

## 2021-03-26 PROCEDURE — 85027 COMPLETE CBC AUTOMATED: CPT

## 2021-03-26 PROCEDURE — U0005 INFEC AGEN DETEC AMPLI PROBE: HCPCS

## 2021-03-26 PROCEDURE — 99285 EMERGENCY DEPT VISIT HI MDM: CPT

## 2021-03-26 PROCEDURE — 82140 ASSAY OF AMMONIA: CPT

## 2021-03-26 PROCEDURE — 80053 COMPREHEN METABOLIC PANEL: CPT

## 2021-03-26 PROCEDURE — C9803 HOPD COVID-19 SPEC COLLECT: HCPCS | Performed by: EMERGENCY MEDICINE

## 2021-03-26 PROCEDURE — 94760 N-INVAS EAR/PLS OXIMETRY 1: CPT

## 2021-03-26 PROCEDURE — U0003 INFECTIOUS AGENT DETECTION BY NUCLEIC ACID (DNA OR RNA); SEVERE ACUTE RESPIRATORY SYNDROME CORONAVIRUS 2 (SARS-COV-2) (CORONAVIRUS DISEASE [COVID-19]), AMPLIFIED PROBE TECHNIQUE, MAKING USE OF HIGH THROUGHPUT TECHNOLOGIES AS DESCRIBED BY CMS-2020-01-R: HCPCS

## 2021-03-26 PROCEDURE — 85055 RETICULATED PLATELET ASSAY: CPT

## 2021-03-26 PROCEDURE — 36415 COLL VENOUS BLD VENIPUNCTURE: CPT

## 2021-03-26 PROCEDURE — 85007 BL SMEAR W/DIFF WBC COUNT: CPT

## 2021-03-26 ASSESSMENT — FIBROSIS 4 INDEX: FIB4 SCORE: 4.02

## 2021-03-27 ENCOUNTER — HOSPITAL ENCOUNTER (OUTPATIENT)
Dept: RADIOLOGY | Facility: MEDICAL CENTER | Age: 46
End: 2021-03-27
Attending: EMERGENCY MEDICINE
Payer: COMMERCIAL

## 2021-03-27 ENCOUNTER — APPOINTMENT (OUTPATIENT)
Dept: RADIOLOGY | Facility: MEDICAL CENTER | Age: 46
DRG: 894 | End: 2021-03-27
Attending: EMERGENCY MEDICINE
Payer: COMMERCIAL

## 2021-03-27 ENCOUNTER — APPOINTMENT (OUTPATIENT)
Dept: CARDIOLOGY | Facility: MEDICAL CENTER | Age: 46
DRG: 894 | End: 2021-03-27
Attending: STUDENT IN AN ORGANIZED HEALTH CARE EDUCATION/TRAINING PROGRAM
Payer: COMMERCIAL

## 2021-03-27 PROBLEM — D64.9 ANEMIA: Status: ACTIVE | Noted: 2021-03-27

## 2021-03-27 PROBLEM — D69.6 THROMBOCYTOPENIA (HCC): Status: ACTIVE | Noted: 2021-03-27

## 2021-03-27 PROBLEM — E83.42 HYPOMAGNESEMIA: Status: ACTIVE | Noted: 2021-03-27

## 2021-03-27 PROBLEM — G47.30 SLEEP APNEA: Status: ACTIVE | Noted: 2021-03-27

## 2021-03-27 PROBLEM — I48.92 ATRIAL FLUTTER BY ELECTROCARDIOGRAM (HCC): Status: ACTIVE | Noted: 2021-03-27

## 2021-03-27 PROBLEM — K65.2 SPONTANEOUS BACTERIAL PERITONITIS (HCC): Status: ACTIVE | Noted: 2021-03-27

## 2021-03-27 PROBLEM — E87.6 HYPOKALEMIA: Status: ACTIVE | Noted: 2021-03-27

## 2021-03-27 PROBLEM — K29.20 ACUTE ALCOHOLIC GASTRITIS WITHOUT HEMORRHAGE: Status: ACTIVE | Noted: 2021-03-27

## 2021-03-27 PROBLEM — K74.60 LIVER CIRRHOSIS (HCC): Status: ACTIVE | Noted: 2021-03-27

## 2021-03-27 PROBLEM — K74.60 LIVER CIRRHOSIS (HCC): Status: RESOLVED | Noted: 2021-03-27 | Resolved: 2021-03-27

## 2021-03-27 PROBLEM — D68.9 COAGULOPATHY (HCC): Status: ACTIVE | Noted: 2021-03-27

## 2021-03-27 PROBLEM — K76.82 HEPATIC ENCEPHALOPATHY (HCC): Status: ACTIVE | Noted: 2021-03-27

## 2021-03-27 LAB
ABO GROUP BLD: NORMAL
ALBUMIN SERPL BCP-MCNC: 3.4 G/DL (ref 3.2–4.9)
ALBUMIN/GLOB SERPL: 1.1 G/DL
ALP SERPL-CCNC: 178 U/L (ref 30–99)
ALT SERPL-CCNC: 15 U/L (ref 2–50)
AMMONIA PLAS-SCNC: 50 UMOL/L (ref 11–45)
ANION GAP SERPL CALC-SCNC: 8 MMOL/L (ref 7–16)
ANION GAP SERPL CALC-SCNC: 9 MMOL/L (ref 7–16)
ANISOCYTOSIS BLD QL SMEAR: ABNORMAL
APTT PPP: 40.5 SEC (ref 24.7–36)
AST SERPL-CCNC: 43 U/L (ref 12–45)
BARCODED ABORH UBTYP: 5100
BARCODED PRD CODE UBPRD: NORMAL
BARCODED UNIT NUM UBUNT: NORMAL
BASOPHILS # BLD AUTO: 1.5 % (ref 0–1.8)
BASOPHILS # BLD AUTO: 3.6 % (ref 0–1.8)
BASOPHILS # BLD: 0.07 K/UL (ref 0–0.12)
BASOPHILS # BLD: 0.26 K/UL (ref 0–0.12)
BILIRUB SERPL-MCNC: 3.7 MG/DL (ref 0.1–1.5)
BLD GP AB SCN SERPL QL: NORMAL
BUN SERPL-MCNC: 6 MG/DL (ref 8–22)
BUN SERPL-MCNC: 6 MG/DL (ref 8–22)
CALCIUM SERPL-MCNC: 7.5 MG/DL (ref 8.5–10.5)
CALCIUM SERPL-MCNC: 8 MG/DL (ref 8.5–10.5)
CHLORIDE SERPL-SCNC: 107 MMOL/L (ref 96–112)
CHLORIDE SERPL-SCNC: 110 MMOL/L (ref 96–112)
CO2 SERPL-SCNC: 22 MMOL/L (ref 20–33)
CO2 SERPL-SCNC: 24 MMOL/L (ref 20–33)
COMPONENT R 8504R: NORMAL
CREAT SERPL-MCNC: 0.52 MG/DL (ref 0.5–1.4)
CREAT SERPL-MCNC: 0.59 MG/DL (ref 0.5–1.4)
EKG IMPRESSION: NORMAL
EOSINOPHIL # BLD AUTO: 0.33 K/UL (ref 0–0.51)
EOSINOPHIL # BLD AUTO: 0.33 K/UL (ref 0–0.51)
EOSINOPHIL NFR BLD: 4.5 % (ref 0–6.9)
EOSINOPHIL NFR BLD: 6.9 % (ref 0–6.9)
ERYTHROCYTE [DISTWIDTH] IN BLOOD BY AUTOMATED COUNT: 62 FL (ref 35.9–50)
ERYTHROCYTE [DISTWIDTH] IN BLOOD BY AUTOMATED COUNT: 62.4 FL (ref 35.9–50)
ERYTHROCYTE [DISTWIDTH] IN BLOOD BY AUTOMATED COUNT: 62.4 FL (ref 35.9–50)
ERYTHROCYTE [DISTWIDTH] IN BLOOD BY AUTOMATED COUNT: 64.2 FL (ref 35.9–50)
ETHANOL BLD-MCNC: 147.6 MG/DL (ref 0–10)
FERRITIN SERPL-MCNC: 10.7 NG/ML (ref 22–322)
GLOBULIN SER CALC-MCNC: 3.2 G/DL (ref 1.9–3.5)
GLUCOSE SERPL-MCNC: 104 MG/DL (ref 65–99)
GLUCOSE SERPL-MCNC: 182 MG/DL (ref 65–99)
HCT VFR BLD AUTO: 25.9 % (ref 42–52)
HCT VFR BLD AUTO: 28.4 % (ref 42–52)
HCT VFR BLD AUTO: 28.6 % (ref 42–52)
HCT VFR BLD AUTO: 28.6 % (ref 42–52)
HGB BLD-MCNC: 6.8 G/DL (ref 14–18)
HGB BLD-MCNC: 7.6 G/DL (ref 14–18)
HGB BLD-MCNC: 7.7 G/DL (ref 14–18)
HGB BLD-MCNC: 8 G/DL (ref 14–18)
HGB RETIC QN AUTO: 16.5 PG/CELL (ref 29–35)
HYPOCHROMIA BLD QL SMEAR: ABNORMAL
IMM GRANULOCYTES # BLD AUTO: 0.04 K/UL (ref 0–0.11)
IMM GRANULOCYTES NFR BLD AUTO: 0.8 % (ref 0–0.9)
IMM RETICS NFR: 25 % (ref 9.3–17.4)
INR PPP: 1.55 (ref 0.87–1.13)
IRON SATN MFR SERPL: 4 % (ref 15–55)
IRON SERPL-MCNC: 16 UG/DL (ref 50–180)
LIPASE SERPL-CCNC: 48 U/L (ref 11–82)
LYMPHOCYTES # BLD AUTO: 1.26 K/UL (ref 1–4.8)
LYMPHOCYTES # BLD AUTO: 1.84 K/UL (ref 1–4.8)
LYMPHOCYTES NFR BLD: 25.2 % (ref 22–41)
LYMPHOCYTES NFR BLD: 26.4 % (ref 22–41)
MACROCYTES BLD QL SMEAR: ABNORMAL
MAGNESIUM SERPL-MCNC: 1.7 MG/DL (ref 1.5–2.5)
MANUAL DIFF BLD: NORMAL
MCH RBC QN AUTO: 19.2 PG (ref 27–33)
MCH RBC QN AUTO: 19.2 PG (ref 27–33)
MCH RBC QN AUTO: 19.5 PG (ref 27–33)
MCH RBC QN AUTO: 20.2 PG (ref 27–33)
MCHC RBC AUTO-ENTMCNC: 26.3 G/DL (ref 33.7–35.3)
MCHC RBC AUTO-ENTMCNC: 26.8 G/DL (ref 33.7–35.3)
MCHC RBC AUTO-ENTMCNC: 26.9 G/DL (ref 33.7–35.3)
MCHC RBC AUTO-ENTMCNC: 28 G/DL (ref 33.7–35.3)
MCV RBC AUTO: 71.9 FL (ref 81.4–97.8)
MCV RBC AUTO: 72.2 FL (ref 81.4–97.8)
MCV RBC AUTO: 72.4 FL (ref 81.4–97.8)
MCV RBC AUTO: 73 FL (ref 81.4–97.8)
MICROCYTES BLD QL SMEAR: ABNORMAL
MONOCYTES # BLD AUTO: 0.59 K/UL (ref 0–0.85)
MONOCYTES # BLD AUTO: 0.61 K/UL (ref 0–0.85)
MONOCYTES NFR BLD AUTO: 12.8 % (ref 0–13.4)
MONOCYTES NFR BLD AUTO: 8.1 % (ref 0–13.4)
MORPHOLOGY BLD-IMP: NORMAL
NEUTROPHILS # BLD AUTO: 2.47 K/UL (ref 1.82–7.42)
NEUTROPHILS # BLD AUTO: 4.28 K/UL (ref 1.82–7.42)
NEUTROPHILS NFR BLD: 51.6 % (ref 44–72)
NEUTROPHILS NFR BLD: 58.6 % (ref 44–72)
NRBC # BLD AUTO: 0 K/UL
NRBC # BLD AUTO: 0 K/UL
NRBC BLD-RTO: 0 /100 WBC
NRBC BLD-RTO: 0 /100 WBC
PHOSPHATE SERPL-MCNC: 4.4 MG/DL (ref 2.5–4.5)
PLATELET # BLD AUTO: 51 K/UL (ref 164–446)
PLATELET # BLD AUTO: 64 K/UL (ref 164–446)
PLATELET # BLD AUTO: 65 K/UL (ref 164–446)
PLATELET # BLD AUTO: 67 K/UL (ref 164–446)
PLATELET BLD QL SMEAR: NORMAL
PLATELETS.RETICULATED NFR BLD AUTO: 19.5 K/UL (ref 0.6–13.1)
POIKILOCYTOSIS BLD QL SMEAR: NORMAL
POLYCHROMASIA BLD QL SMEAR: NORMAL
POTASSIUM SERPL-SCNC: 3.5 MMOL/L (ref 3.6–5.5)
POTASSIUM SERPL-SCNC: 4 MMOL/L (ref 3.6–5.5)
PRODUCT TYPE UPROD: NORMAL
PROT SERPL-MCNC: 6.6 G/DL (ref 6–8.2)
PROTHROMBIN TIME: 19.1 SEC (ref 12–14.6)
RBC # BLD AUTO: 3.55 M/UL (ref 4.7–6.1)
RBC # BLD AUTO: 3.95 M/UL (ref 4.7–6.1)
RBC # BLD AUTO: 3.95 M/UL (ref 4.7–6.1)
RBC # BLD AUTO: 3.96 M/UL (ref 4.7–6.1)
RBC BLD AUTO: PRESENT
RETICS # AUTO: 0.13 M/UL (ref 0.04–0.06)
RETICS/RBC NFR: 3.4 % (ref 0.8–2.1)
RH BLD: NORMAL
SARS-COV+SARS-COV-2 AG RESP QL IA.RAPID: NOTDETECTED
SARS-COV-2 RNA RESP QL NAA+PROBE: NOTDETECTED
SODIUM SERPL-SCNC: 140 MMOL/L (ref 135–145)
SODIUM SERPL-SCNC: 140 MMOL/L (ref 135–145)
SPECIMEN SOURCE: NORMAL
SPECIMEN SOURCE: NORMAL
TARGETS BLD QL SMEAR: NORMAL
TIBC SERPL-MCNC: 402 UG/DL (ref 250–450)
TSH SERPL DL<=0.005 MIU/L-ACNC: 1.07 UIU/ML (ref 0.38–5.33)
TSH SERPL DL<=0.005 MIU/L-ACNC: 1.29 UIU/ML (ref 0.38–5.33)
UIBC SERPL-MCNC: 386 UG/DL (ref 110–370)
UNIT STATUS USTAT: NORMAL
WBC # BLD AUTO: 4.8 K/UL (ref 4.8–10.8)
WBC # BLD AUTO: 6 K/UL (ref 4.8–10.8)
WBC # BLD AUTO: 7.3 K/UL (ref 4.8–10.8)
WBC # BLD AUTO: 8 K/UL (ref 4.8–10.8)

## 2021-03-27 PROCEDURE — 700111 HCHG RX REV CODE 636 W/ 250 OVERRIDE (IP): Performed by: INTERNAL MEDICINE

## 2021-03-27 PROCEDURE — 93306 TTE W/DOPPLER COMPLETE: CPT | Mod: 26 | Performed by: INTERNAL MEDICINE

## 2021-03-27 PROCEDURE — 93010 ELECTROCARDIOGRAM REPORT: CPT | Performed by: INTERNAL MEDICINE

## 2021-03-27 PROCEDURE — 85027 COMPLETE CBC AUTOMATED: CPT

## 2021-03-27 PROCEDURE — 71045 X-RAY EXAM CHEST 1 VIEW: CPT

## 2021-03-27 PROCEDURE — 82077 ASSAY SPEC XCP UR&BREATH IA: CPT

## 2021-03-27 PROCEDURE — 99223 1ST HOSP IP/OBS HIGH 75: CPT | Performed by: INTERNAL MEDICINE

## 2021-03-27 PROCEDURE — 93306 TTE W/DOPPLER COMPLETE: CPT

## 2021-03-27 PROCEDURE — A9270 NON-COVERED ITEM OR SERVICE: HCPCS | Performed by: STUDENT IN AN ORGANIZED HEALTH CARE EDUCATION/TRAINING PROGRAM

## 2021-03-27 PROCEDURE — 700102 HCHG RX REV CODE 250 W/ 637 OVERRIDE(OP): Performed by: STUDENT IN AN ORGANIZED HEALTH CARE EDUCATION/TRAINING PROGRAM

## 2021-03-27 PROCEDURE — 700117 HCHG RX CONTRAST REV CODE 255: Performed by: EMERGENCY MEDICINE

## 2021-03-27 PROCEDURE — 700102 HCHG RX REV CODE 250 W/ 637 OVERRIDE(OP): Performed by: INTERNAL MEDICINE

## 2021-03-27 PROCEDURE — A9270 NON-COVERED ITEM OR SERVICE: HCPCS | Performed by: INTERNAL MEDICINE

## 2021-03-27 PROCEDURE — P9016 RBC LEUKOCYTES REDUCED: HCPCS

## 2021-03-27 PROCEDURE — 700111 HCHG RX REV CODE 636 W/ 250 OVERRIDE (IP): Performed by: EMERGENCY MEDICINE

## 2021-03-27 PROCEDURE — 86923 COMPATIBILITY TEST ELECTRIC: CPT

## 2021-03-27 PROCEDURE — 86900 BLOOD TYPING SEROLOGIC ABO: CPT

## 2021-03-27 PROCEDURE — 700105 HCHG RX REV CODE 258: Performed by: INTERNAL MEDICINE

## 2021-03-27 PROCEDURE — 99291 CRITICAL CARE FIRST HOUR: CPT | Performed by: INTERNAL MEDICINE

## 2021-03-27 PROCEDURE — 85046 RETICYTE/HGB CONCENTRATE: CPT

## 2021-03-27 PROCEDURE — 83735 ASSAY OF MAGNESIUM: CPT

## 2021-03-27 PROCEDURE — C9113 INJ PANTOPRAZOLE SODIUM, VIA: HCPCS | Performed by: INTERNAL MEDICINE

## 2021-03-27 PROCEDURE — 93005 ELECTROCARDIOGRAM TRACING: CPT | Performed by: STUDENT IN AN ORGANIZED HEALTH CARE EDUCATION/TRAINING PROGRAM

## 2021-03-27 PROCEDURE — 36415 COLL VENOUS BLD VENIPUNCTURE: CPT

## 2021-03-27 PROCEDURE — 30233N1 TRANSFUSION OF NONAUTOLOGOUS RED BLOOD CELLS INTO PERIPHERAL VEIN, PERCUTANEOUS APPROACH: ICD-10-PCS | Performed by: INTERNAL MEDICINE

## 2021-03-27 PROCEDURE — 87426 SARSCOV CORONAVIRUS AG IA: CPT

## 2021-03-27 PROCEDURE — HZ2ZZZZ DETOXIFICATION SERVICES FOR SUBSTANCE ABUSE TREATMENT: ICD-10-PCS | Performed by: INTERNAL MEDICINE

## 2021-03-27 PROCEDURE — 86850 RBC ANTIBODY SCREEN: CPT

## 2021-03-27 PROCEDURE — 86901 BLOOD TYPING SEROLOGIC RH(D): CPT

## 2021-03-27 PROCEDURE — 84100 ASSAY OF PHOSPHORUS: CPT

## 2021-03-27 PROCEDURE — 85025 COMPLETE CBC W/AUTO DIFF WBC: CPT

## 2021-03-27 PROCEDURE — 80048 BASIC METABOLIC PNL TOTAL CA: CPT

## 2021-03-27 PROCEDURE — 36430 TRANSFUSION BLD/BLD COMPNT: CPT

## 2021-03-27 PROCEDURE — 96365 THER/PROPH/DIAG IV INF INIT: CPT

## 2021-03-27 PROCEDURE — 700101 HCHG RX REV CODE 250: Performed by: STUDENT IN AN ORGANIZED HEALTH CARE EDUCATION/TRAINING PROGRAM

## 2021-03-27 PROCEDURE — 84443 ASSAY THYROID STIM HORMONE: CPT

## 2021-03-27 PROCEDURE — 700111 HCHG RX REV CODE 636 W/ 250 OVERRIDE (IP): Performed by: STUDENT IN AN ORGANIZED HEALTH CARE EDUCATION/TRAINING PROGRAM

## 2021-03-27 PROCEDURE — 83540 ASSAY OF IRON: CPT

## 2021-03-27 PROCEDURE — 96375 TX/PRO/DX INJ NEW DRUG ADDON: CPT

## 2021-03-27 PROCEDURE — 770020 HCHG ROOM/CARE - TELE (206)

## 2021-03-27 PROCEDURE — 83550 IRON BINDING TEST: CPT

## 2021-03-27 PROCEDURE — 74177 CT ABD & PELVIS W/CONTRAST: CPT

## 2021-03-27 RX ORDER — LORAZEPAM 2 MG/ML
1 INJECTION INTRAMUSCULAR
Status: DISCONTINUED | OUTPATIENT
Start: 2021-03-27 | End: 2021-03-28

## 2021-03-27 RX ORDER — FOLIC ACID 1 MG/1
1 TABLET ORAL DAILY
Status: DISCONTINUED | OUTPATIENT
Start: 2021-03-28 | End: 2021-03-28

## 2021-03-27 RX ORDER — LACTULOSE 20 G/30ML
30 SOLUTION ORAL 3 TIMES DAILY
Status: CANCELLED | OUTPATIENT
Start: 2021-03-27

## 2021-03-27 RX ORDER — POTASSIUM CHLORIDE 20 MEQ/1
20 TABLET, EXTENDED RELEASE ORAL DAILY
Status: DISCONTINUED | OUTPATIENT
Start: 2021-03-27 | End: 2021-03-27

## 2021-03-27 RX ORDER — CLONIDINE HYDROCHLORIDE 0.1 MG/1
0.1 TABLET ORAL
Status: DISCONTINUED | OUTPATIENT
Start: 2021-03-27 | End: 2021-03-28

## 2021-03-27 RX ORDER — LORAZEPAM 2 MG/ML
0.5 INJECTION INTRAMUSCULAR EVERY 4 HOURS PRN
Status: DISCONTINUED | OUTPATIENT
Start: 2021-03-27 | End: 2021-03-28

## 2021-03-27 RX ORDER — AMOXICILLIN 250 MG
2 CAPSULE ORAL 2 TIMES DAILY
Status: DISCONTINUED | OUTPATIENT
Start: 2021-03-27 | End: 2021-03-28

## 2021-03-27 RX ORDER — POLYETHYLENE GLYCOL 3350 17 G/17G
1 POWDER, FOR SOLUTION ORAL
Status: DISCONTINUED | OUTPATIENT
Start: 2021-03-27 | End: 2021-03-28

## 2021-03-27 RX ORDER — MAGNESIUM SULFATE HEPTAHYDRATE 40 MG/ML
4 INJECTION, SOLUTION INTRAVENOUS ONCE
Status: COMPLETED | OUTPATIENT
Start: 2021-03-27 | End: 2021-03-27

## 2021-03-27 RX ORDER — LORAZEPAM 0.5 MG/1
0.5 TABLET ORAL EVERY 4 HOURS PRN
Status: DISCONTINUED | OUTPATIENT
Start: 2021-03-27 | End: 2021-03-28

## 2021-03-27 RX ORDER — LABETALOL HYDROCHLORIDE 5 MG/ML
10 INJECTION, SOLUTION INTRAVENOUS EVERY 4 HOURS PRN
Status: DISCONTINUED | OUTPATIENT
Start: 2021-03-27 | End: 2021-03-28

## 2021-03-27 RX ORDER — SPIRONOLACTONE 25 MG/1
25 TABLET ORAL
Status: DISCONTINUED | OUTPATIENT
Start: 2021-03-27 | End: 2021-03-27

## 2021-03-27 RX ORDER — DIGOXIN 0.25 MG/ML
500 INJECTION INTRAMUSCULAR; INTRAVENOUS ONCE
Status: COMPLETED | OUTPATIENT
Start: 2021-03-27 | End: 2021-03-27

## 2021-03-27 RX ORDER — SODIUM CHLORIDE 9 MG/ML
INJECTION, SOLUTION INTRAVENOUS CONTINUOUS
Status: ACTIVE | OUTPATIENT
Start: 2021-03-27 | End: 2021-03-27

## 2021-03-27 RX ORDER — LORAZEPAM 2 MG/ML
2 INJECTION INTRAMUSCULAR
Status: DISCONTINUED | OUTPATIENT
Start: 2021-03-27 | End: 2021-03-28

## 2021-03-27 RX ORDER — LORAZEPAM 2 MG/1
4 TABLET ORAL
Status: DISCONTINUED | OUTPATIENT
Start: 2021-03-27 | End: 2021-03-28

## 2021-03-27 RX ORDER — NICOTINE 21 MG/24HR
21 PATCH, TRANSDERMAL 24 HOURS TRANSDERMAL
Status: DISCONTINUED | OUTPATIENT
Start: 2021-03-27 | End: 2021-03-30 | Stop reason: HOSPADM

## 2021-03-27 RX ORDER — OMEPRAZOLE 20 MG/1
20 CAPSULE, DELAYED RELEASE ORAL DAILY
COMMUNITY
End: 2021-11-09

## 2021-03-27 RX ORDER — LACTULOSE 20 G/30ML
30 SOLUTION ORAL ONCE
Status: COMPLETED | OUTPATIENT
Start: 2021-03-27 | End: 2021-03-27

## 2021-03-27 RX ORDER — METOPROLOL TARTRATE 1 MG/ML
5 INJECTION, SOLUTION INTRAVENOUS
Status: DISCONTINUED | OUTPATIENT
Start: 2021-03-27 | End: 2021-03-27

## 2021-03-27 RX ORDER — OMEPRAZOLE 20 MG/1
20 CAPSULE, DELAYED RELEASE ORAL DAILY
Status: DISCONTINUED | OUTPATIENT
Start: 2021-03-27 | End: 2021-03-27

## 2021-03-27 RX ORDER — LORAZEPAM 1 MG/1
1 TABLET ORAL EVERY 4 HOURS PRN
Status: DISCONTINUED | OUTPATIENT
Start: 2021-03-27 | End: 2021-03-28

## 2021-03-27 RX ORDER — POTASSIUM CHLORIDE 20 MEQ/1
40 TABLET, EXTENDED RELEASE ORAL 2 TIMES DAILY
Status: DISCONTINUED | OUTPATIENT
Start: 2021-03-27 | End: 2021-03-27

## 2021-03-27 RX ORDER — PANTOPRAZOLE SODIUM 40 MG/10ML
40 INJECTION, POWDER, LYOPHILIZED, FOR SOLUTION INTRAVENOUS 2 TIMES DAILY
Status: DISCONTINUED | OUTPATIENT
Start: 2021-03-27 | End: 2021-03-29

## 2021-03-27 RX ORDER — BISACODYL 10 MG
10 SUPPOSITORY, RECTAL RECTAL
Status: DISCONTINUED | OUTPATIENT
Start: 2021-03-27 | End: 2021-03-28

## 2021-03-27 RX ORDER — GAUZE BANDAGE 2" X 2"
100 BANDAGE TOPICAL DAILY
Status: DISCONTINUED | OUTPATIENT
Start: 2021-03-28 | End: 2021-03-28

## 2021-03-27 RX ORDER — LORAZEPAM 2 MG/ML
1.5 INJECTION INTRAMUSCULAR
Status: DISCONTINUED | OUTPATIENT
Start: 2021-03-27 | End: 2021-03-28

## 2021-03-27 RX ORDER — POTASSIUM CHLORIDE 20 MEQ/1
40 TABLET, EXTENDED RELEASE ORAL DAILY
Status: DISCONTINUED | OUTPATIENT
Start: 2021-03-28 | End: 2021-03-28

## 2021-03-27 RX ORDER — CHLORDIAZEPOXIDE HYDROCHLORIDE 25 MG/1
25 CAPSULE, GELATIN COATED ORAL EVERY 6 HOURS
Status: DISCONTINUED | OUTPATIENT
Start: 2021-03-28 | End: 2021-03-28

## 2021-03-27 RX ORDER — SODIUM CHLORIDE 9 MG/ML
2000 INJECTION, SOLUTION INTRAVENOUS CONTINUOUS
Status: ACTIVE | OUTPATIENT
Start: 2021-03-27 | End: 2021-03-27

## 2021-03-27 RX ORDER — CHLORDIAZEPOXIDE HYDROCHLORIDE 25 MG/1
50 CAPSULE, GELATIN COATED ORAL EVERY 6 HOURS
Status: DISCONTINUED | OUTPATIENT
Start: 2021-03-27 | End: 2021-03-28

## 2021-03-27 RX ORDER — LORAZEPAM 2 MG/1
2 TABLET ORAL
Status: DISCONTINUED | OUTPATIENT
Start: 2021-03-27 | End: 2021-03-28

## 2021-03-27 RX ORDER — METOPROLOL TARTRATE 1 MG/ML
5 INJECTION, SOLUTION INTRAVENOUS
Status: DISCONTINUED | OUTPATIENT
Start: 2021-03-27 | End: 2021-03-30 | Stop reason: HOSPADM

## 2021-03-27 RX ADMIN — FAMOTIDINE 20 MG: 10 INJECTION INTRAVENOUS at 02:18

## 2021-03-27 RX ADMIN — CHLORDIAZEPOXIDE HYDROCHLORIDE 50 MG: 25 CAPSULE ORAL at 12:24

## 2021-03-27 RX ADMIN — SPIRONOLACTONE 25 MG: 25 TABLET ORAL at 05:11

## 2021-03-27 RX ADMIN — CHLORDIAZEPOXIDE HYDROCHLORIDE 50 MG: 25 CAPSULE ORAL at 17:19

## 2021-03-27 RX ADMIN — MAGNESIUM SULFATE IN WATER 4 G: 40 INJECTION, SOLUTION INTRAVENOUS at 12:24

## 2021-03-27 RX ADMIN — LORAZEPAM 1 MG: 2 INJECTION INTRAMUSCULAR; INTRAVENOUS at 23:05

## 2021-03-27 RX ADMIN — METOPROLOL TARTRATE 25 MG: 25 TABLET, FILM COATED ORAL at 17:19

## 2021-03-27 RX ADMIN — METOPROLOL TARTRATE 5 MG: 1 INJECTION, SOLUTION INTRAVENOUS at 16:18

## 2021-03-27 RX ADMIN — SODIUM CHLORIDE 2000 ML: 9 INJECTION, SOLUTION INTRAVENOUS at 03:55

## 2021-03-27 RX ADMIN — PANTOPRAZOLE SODIUM 40 MG: 40 INJECTION, POWDER, FOR SOLUTION INTRAVENOUS at 05:12

## 2021-03-27 RX ADMIN — PHYTONADIONE 10 MG: 10 INJECTION, EMULSION INTRAMUSCULAR; INTRAVENOUS; SUBCUTANEOUS at 02:32

## 2021-03-27 RX ADMIN — DIGOXIN 500 MCG: 0.25 INJECTION INTRAMUSCULAR; INTRAVENOUS at 12:24

## 2021-03-27 RX ADMIN — LORAZEPAM 0.5 MG: 2 INJECTION INTRAMUSCULAR; INTRAVENOUS at 21:38

## 2021-03-27 RX ADMIN — IOHEXOL 90 ML: 350 INJECTION, SOLUTION INTRAVENOUS at 01:00

## 2021-03-27 RX ADMIN — LACTULOSE 30 ML: 20 SOLUTION ORAL at 05:11

## 2021-03-27 RX ADMIN — METOPROLOL TARTRATE 5 MG: 1 INJECTION, SOLUTION INTRAVENOUS at 10:18

## 2021-03-27 RX ADMIN — THIAMINE HYDROCHLORIDE: 100 INJECTION, SOLUTION INTRAMUSCULAR; INTRAVENOUS at 14:00

## 2021-03-27 RX ADMIN — POTASSIUM CHLORIDE 20 MEQ: 1500 TABLET, EXTENDED RELEASE ORAL at 05:11

## 2021-03-27 RX ADMIN — METOPROLOL TARTRATE 25 MG: 25 TABLET, FILM COATED ORAL at 09:46

## 2021-03-27 RX ADMIN — SODIUM CHLORIDE: 9 INJECTION, SOLUTION INTRAVENOUS at 13:25

## 2021-03-27 RX ADMIN — NICOTINE TRANSDERMAL SYSTEM 21 MG: 21 PATCH, EXTENDED RELEASE TRANSDERMAL at 05:11

## 2021-03-27 RX ADMIN — PANTOPRAZOLE SODIUM 40 MG: 40 INJECTION, POWDER, FOR SOLUTION INTRAVENOUS at 18:38

## 2021-03-27 ASSESSMENT — LIFESTYLE VARIABLES
ANXIETY: NO ANXIETY (AT EASE)
HEADACHE, FULLNESS IN HEAD: VERY MILD
HEADACHE, FULLNESS IN HEAD: VERY MILD
AUDITORY DISTURBANCES: NOT PRESENT
AGITATION: MODERATELY FIDGETY AND RESTLESS
SUBSTANCE_ABUSE: 1
TREMOR: *
ANXIETY: NO ANXIETY (AT EASE)
ANXIETY: EQUIVALENT TO ACUTE PANIC STATES AS OCCUR IN SEVERE DELIRIUM OR ACUTE SCHIZOPHRENIC REACTIONS
ORIENTATION AND CLOUDING OF SENSORIUM: ORIENTED AND CAN DO SERIAL ADDITIONS
ORIENTATION AND CLOUDING OF SENSORIUM: ORIENTED AND CAN DO SERIAL ADDITIONS
VISUAL DISTURBANCES: NOT PRESENT
TOTAL SCORE: 3
AUDITORY DISTURBANCES: NOT PRESENT
ORIENTATION AND CLOUDING OF SENSORIUM: ORIENTED AND CAN DO SERIAL ADDITIONS
TOTAL SCORE: 6
ON A TYPICAL DAY WHEN YOU DRINK ALCOHOL HOW MANY DRINKS DO YOU HAVE: 4
TOTAL SCORE: 2
AGITATION: NORMAL ACTIVITY
TREMOR: *
AGITATION: NORMAL ACTIVITY
PAROXYSMAL SWEATS: BEADS OF SWEAT OBVIOUS ON FOREHEAD
ORIENTATION AND CLOUDING OF SENSORIUM: ORIENTED AND CAN DO SERIAL ADDITIONS
TOTAL SCORE: 23
AUDITORY DISTURBANCES: NOT PRESENT
EVER HAD A DRINK FIRST THING IN THE MORNING TO STEADY YOUR NERVES TO GET RID OF A HANGOVER: NO
ANXIETY: NO ANXIETY (AT EASE)
VISUAL DISTURBANCES: NOT PRESENT
VISUAL DISTURBANCES: NOT PRESENT
HEADACHE, FULLNESS IN HEAD: VERY MILD
TREMOR: *
TOTAL SCORE: 2
PAROXYSMAL SWEATS: *
TREMOR: *
CONSUMPTION TOTAL: POSITIVE
NAUSEA AND VOMITING: *
HEADACHE, FULLNESS IN HEAD: VERY MILD
HEADACHE, FULLNESS IN HEAD: MILD
SUBSTANCE_ABUSE: 0
AGITATION: NORMAL ACTIVITY
ALCOHOL_USE: YES
TREMOR: TREMOR NOT VISIBLE BUT CAN BE FELT, FINGERTIP TO FINGERTIP
HEADACHE, FULLNESS IN HEAD: VERY MILD
AVERAGE NUMBER OF DAYS PER WEEK YOU HAVE A DRINK CONTAINING ALCOHOL: 7
NAUSEA AND VOMITING: NO NAUSEA AND NO VOMITING
TREMOR: TREMOR NOT VISIBLE BUT CAN BE FELT, FINGERTIP TO FINGERTIP
AUDITORY DISTURBANCES: NOT PRESENT
PAROXYSMAL SWEATS: BARELY PERCEPTIBLE SWEATING. PALMS MOIST
ORIENTATION AND CLOUDING OF SENSORIUM: CANNOT DO SERIAL ADDITIONS OR IS UNCERTAIN ABOUT DATE
NAUSEA AND VOMITING: NO NAUSEA AND NO VOMITING
ORIENTATION AND CLOUDING OF SENSORIUM: ORIENTED AND CAN DO SERIAL ADDITIONS
AGITATION: NORMAL ACTIVITY
PAROXYSMAL SWEATS: *
TOTAL SCORE: 11
TOTAL SCORE: 3
TOTAL SCORE: 4
DOES PATIENT WANT TO STOP DRINKING: CANNOT ASSESS
VISUAL DISTURBANCES: NOT PRESENT
TOTAL SCORE: 2
PAROXYSMAL SWEATS: BARELY PERCEPTIBLE SWEATING. PALMS MOIST
AUDITORY DISTURBANCES: NOT PRESENT
PAROXYSMAL SWEATS: BARELY PERCEPTIBLE SWEATING. PALMS MOIST
ANXIETY: MILDLY ANXIOUS
VISUAL DISTURBANCES: NOT PRESENT
AGITATION: NORMAL ACTIVITY
NAUSEA AND VOMITING: NO NAUSEA AND NO VOMITING
HOW MANY TIMES IN THE PAST YEAR HAVE YOU HAD 5 OR MORE DRINKS IN A DAY: 5
EVER FELT BAD OR GUILTY ABOUT YOUR DRINKING: NO
NAUSEA AND VOMITING: NO NAUSEA AND NO VOMITING
HAVE YOU EVER FELT YOU SHOULD CUT DOWN ON YOUR DRINKING: YES
NAUSEA AND VOMITING: NO NAUSEA AND NO VOMITING
ANXIETY: MODERATELY ANXIOUS OR GUARDED, SO ANXIETY IS INFERRED
AUDITORY DISTURBANCES: NOT PRESENT
VISUAL DISTURBANCES: NOT PRESENT
HAVE PEOPLE ANNOYED YOU BY CRITICIZING YOUR DRINKING: YES

## 2021-03-27 ASSESSMENT — ENCOUNTER SYMPTOMS
DOUBLE VISION: 0
FALLS: 0
BLOOD IN STOOL: 0
CONSTIPATION: 0
DIZZINESS: 0
FOCAL WEAKNESS: 0
PALPITATIONS: 1
SENSORY CHANGE: 0
SHORTNESS OF BREATH: 0
DEPRESSION: 0
TINGLING: 0
EYE DISCHARGE: 0
HALLUCINATIONS: 0
COUGH: 0
NAUSEA: 0
SORE THROAT: 0
EYE REDNESS: 0
PALPITATIONS: 0
CHILLS: 0
DIARRHEA: 0
HEARTBURN: 1
WEIGHT LOSS: 0
BACK PAIN: 0
ABDOMINAL PAIN: 0
WEAKNESS: 0
WHEEZING: 0
HEADACHES: 0
VOMITING: 0
TREMORS: 0
NERVOUS/ANXIOUS: 0
FEVER: 0
HEARTBURN: 0
CLAUDICATION: 0
SPUTUM PRODUCTION: 0
MYALGIAS: 0
SEIZURES: 0
INSOMNIA: 0
SPEECH CHANGE: 0
SINUS PAIN: 0

## 2021-03-27 ASSESSMENT — FIBROSIS 4 INDEX
FIB4 SCORE: 7.46
FIB4 SCORE: 7.34
FIB4 SCORE: 7.46
FIB4 SCORE: 9.36

## 2021-03-27 ASSESSMENT — COGNITIVE AND FUNCTIONAL STATUS - GENERAL
HELP NEEDED FOR BATHING: A LITTLE
SUGGESTED CMS G CODE MODIFIER MOBILITY: CH
MOBILITY SCORE: 18
SUGGESTED CMS G CODE MODIFIER DAILY ACTIVITY: CJ
MOVING TO AND FROM BED TO CHAIR: A LITTLE
SUGGESTED CMS G CODE MODIFIER MOBILITY: CK
DRESSING REGULAR LOWER BODY CLOTHING: A LITTLE
WALKING IN HOSPITAL ROOM: A LITTLE
MOVING FROM LYING ON BACK TO SITTING ON SIDE OF FLAT BED: A LITTLE
SUGGESTED CMS G CODE MODIFIER DAILY ACTIVITY: CH
MOBILITY SCORE: 24
TURNING FROM BACK TO SIDE WHILE IN FLAT BAD: A LITTLE
STANDING UP FROM CHAIR USING ARMS: A LITTLE
DRESSING REGULAR UPPER BODY CLOTHING: A LITTLE
CLIMB 3 TO 5 STEPS WITH RAILING: A LITTLE
DAILY ACTIVITIY SCORE: 24
DAILY ACTIVITIY SCORE: 20
TOILETING: A LITTLE

## 2021-03-27 NOTE — CONSULTS
EP Consult Note    DOS: 3/27/2021    Consulting physician: Maik Barrett    Chief complaint/Reason for consult: AFL    HPI:  Patient is a 46 yo M. History of alcoholic cirrhosis and previous UGIB. Admitted here last year placed on PPI, left AMA prior to EGD evaluation. He has since run out of PPI. Continues to drink. Admitted for confusion and AMS. Found to be in rapid 2:1 flutter. Otherwise hemodynamically stable. He is a little somnolent on interview but otherwise really no complaints. Bedside pulse-ox showed pulse > 200, but 12 lead confirms 2:1 flutter.      ROS (+ highlighted in red):  Constitutional: Fevers/chills/fatigue/weightloss  HEENT: Blurry vision/eye pain/sore throat/hearing loss  Respiratory: Shortness of breath/cough  Cardiovascular: Chest pain/palpitations/edema/orthopnea/syncope  GI: Nausea/vomitting/diarrhea  MSK: Arthralgias/myagias/muscle weakness  Skin: Rash/sores  Neurological: Numbness/tremors/vertigo  Endocrine: Excessive thirst/polyuria/cold intolerance/heat intolerance  Psych: Depression/anxiety    PMhx:  UGIB  Alcohol dependency    PSHx:  None    Social History     Socioeconomic History   • Marital status:      Spouse name: Not on file   • Number of children: Not on file   • Years of education: Not on file   • Highest education level: Not on file   Occupational History   • Not on file   Tobacco Use   • Smoking status: Current Every Day Smoker     Packs/day: 1.00     Years: 20.00     Pack years: 20.00     Types: Cigarettes     Last attempt to quit: 3/3/2013     Years since quittin.0   • Smokeless tobacco: Former User     Types: Chew     Quit date: 3/4/2012   • Tobacco comment: chewed for 10 years   Substance and Sexual Activity   • Alcohol use: Not Currently     Alcohol/week: 21.0 oz     Types: 42 Cans of beer per week   • Drug use: Yes     Types: Marijuana     Comment: at night prn   • Sexual activity: Yes     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: , printer   Other Topics Concern    • Not on file   Social History Narrative   • Not on file     Social Determinants of Health     Financial Resource Strain:    • Difficulty of Paying Living Expenses:    Food Insecurity:    • Worried About Running Out of Food in the Last Year:    • Ran Out of Food in the Last Year:    Transportation Needs:    • Lack of Transportation (Medical):    • Lack of Transportation (Non-Medical):    Physical Activity:    • Days of Exercise per Week:    • Minutes of Exercise per Session:    Stress:    • Feeling of Stress :    Social Connections:    • Frequency of Communication with Friends and Family:    • Frequency of Social Gatherings with Friends and Family:    • Attends Faith Services:    • Active Member of Clubs or Organizations:    • Attends Club or Organization Meetings:    • Marital Status:    Intimate Partner Violence:    • Fear of Current or Ex-Partner:    • Emotionally Abused:    • Physically Abused:    • Sexually Abused:        Family History   Problem Relation Age of Onset   • Other Mother         smoker   • Lung Disease Mother         COPD   • Diabetes Father    • Other Brother         gout   • Heart Attack Maternal Grandfather    • Other Brother         epilepsy       No Known Allergies    Current Facility-Administered Medications   Medication Dose Route Frequency Provider Last Rate Last Admin   • senna-docusate (PERICOLACE or SENOKOT S) 8.6-50 MG per tablet 2 tablet  2 tablet Oral BID Alfonso Contreras M.D.        And   • polyethylene glycol/lytes (MIRALAX) PACKET 1 Packet  1 Packet Oral QDAY PRN Alfonso Contreras M.D.        And   • magnesium hydroxide (MILK OF MAGNESIA) suspension 30 mL  30 mL Oral QDAY PRN Alfonso Contreras M.D.        And   • bisacodyl (DULCOLAX) suppository 10 mg  10 mg Rectal QDAY PRN Alfonso Contreras M.D.       • labetalol (NORMODYNE/TRANDATE) injection 10 mg  10 mg Intravenous Q4HRS PRN Alfonso Contreras M.D.       • spironolactone (ALDACTONE) tablet 25 mg  25 mg Oral Q DAY Alfonso Contreras  M.D.   25 mg at 03/27/21 0511   • NS infusion 2,000 mL  2,000 mL Intravenous Continuous Alfonso Contreras M.D. 250 mL/hr at 03/27/21 0355 2,000 mL at 03/27/21 0355   • pantoprazole (PROTONIX) injection 40 mg  40 mg Intravenous BID Alfonso Contreras M.D.   40 mg at 03/27/21 0512   • nicotine (NICODERM) 21 MG/24HR 21 mg  21 mg Transdermal Daily-0600 Alfonso Contreras M.D.   21 mg at 03/27/21 0511    And   • nicotine polacrilex (NICORETTE) 2 MG piece 2 mg  2 mg Oral Q HOUR PRN Alfonso Contreras M.D.       • [START ON 3/28/2021] potassium chloride SA (Kdur) tablet 40 mEq  40 mEq Oral DAILY Maik Barrett M.D.       • LORazepam (ATIVAN) tablet 0.5 mg  0.5 mg Oral Q4HRS PRN Maik Barrett M.D.       • LORazepam (ATIVAN) tablet 1 mg  1 mg Oral Q4HRS PRN Maik Barrett M.D.        Or   • LORazepam (ATIVAN) injection 0.5 mg  0.5 mg Intravenous Q4HRS PRN Maik Barrett M.D.       • LORazepam (ATIVAN) tablet 2 mg  2 mg Oral Q2HRS PRN Maik Barrett M.D.        Or   • LORazepam (ATIVAN) injection 1 mg  1 mg Intravenous Q2HRS PRN Maik Barrett M.D.       • LORazepam (ATIVAN) tablet 3 mg  3 mg Oral Q HOUR PRN Maik Barrett M.D.        Or   • LORazepam (ATIVAN) injection 1.5 mg  1.5 mg Intravenous Q HOUR PRN Maik Barrett M.D.       • LORazepam (ATIVAN) tablet 4 mg  4 mg Oral Q15 MIN PRN Maik Barrett M.D.        Or   • LORazepam (ATIVAN) injection 2 mg  2 mg Intravenous Q15 MIN PRN Maik Barrett M.D.       • chlordiazePOXIDE (LIBRIUM) capsule 50 mg  50 mg Oral Q6HRS Maik Barrett M.D.        Followed by   • [START ON 3/28/2021] chlordiazePOXIDE (LIBRIUM) capsule 25 mg  25 mg Oral Q6HRS Maik Barrett M.D.       • detox IV 1000 mL (D5LR + magnesium 1 g + thiamine 100 mg + folic acid 1 mg) infusion   Intravenous Once Maik Barrett M.D.        And   • [START ON 3/28/2021] thiamine (Vitamin B-1) tablet 100 mg  100 mg Oral DAILY Maik Barrett M.D.        And   • [START ON 3/28/2021] multivitamin (THERAGRAN) tablet 1 tablet  1 tablet Oral DAILY Maik Barrett M.D.        And   • [START ON  3/28/2021] folic acid (FOLVITE) tablet 1 mg  1 mg Oral DAILY Maik Barrett M.D.       • cloNIDine (CATAPRES) tablet 0.1 mg  0.1 mg Oral Q HOUR PRN Maik Barrett M.D.       • metoprolol tartrate (LOPRESSOR) tablet 25 mg  25 mg Oral TWICE DAILY Maik Barrett M.D.   25 mg at 03/27/21 0946   • Metoprolol Tartrate (LOPRESSOR) injection 5 mg  5 mg Intravenous Q5 MIN PRN Maik Barrett M.D.           Physical Exam:  Vitals:    03/27/21 0405 03/27/21 0731 03/27/21 1007 03/27/21 1011   BP: 103/64 131/77  133/90   Pulse: 81 74 (!) 143 (!) 134   Resp: 16 16 16 16   Temp: 36.4 °C (97.5 °F) 36.7 °C (98.1 °F)  36.9 °C (98.4 °F)   TempSrc: Temporal Temporal     SpO2: 96% 93% 97%    Weight: 117 kg (257 lb 11.5 oz)      Height:         General appearance: NAD, somnolent  Eyes: anicteric sclerae, moist conjunctivae; no lid-lag; PERRLA  HENT: Atraumatic; oropharynx clear with moist mucous membranes and no mucosal ulcerations; normal hard and soft palate  Neck: Trachea midline; FROM, supple, no thyromegaly or lymphadenopathy  Lungs: CTA, with normal respiratory effort and no intercostal retractions  CV: RRR, tachy, no MRGs, no JVD   Abdomen: Soft, non-tender; no masses or HSM  Extremities: No peripheral edema or extremity lymphadenopathy  Skin: Normal temperature, turgor and texture; no rash, ulcers or subcutaneous nodules  Psych: Flat affect, oriented to person, place and time    Data:  Labs reviewed:  Hgb ~7, iron sat 4%, EtOH 147    CXR interpreted by me:  WNL    EKG interpreted by me:   RENETTA 2:1    CT abd/pelvis:     1.  Findings consistent with hepatic cirrhosis and portal hypertension.  2.  Probable small liver cyst.  3.  Diffuse mesenteric edema, likely related to liver disease.  4.  Mild wall thickening of ascending colon which may be due to liver disease or colitis.  5.  Small bilateral pleural effusions and body wall edema.    Impression/Plan:  1) RENETTA w RVR  2) EtOH dependency w/ drawal  3) Cirrhosis/portal HTN  4) History of GI  bleed/anemia    -Pt currently hemodynamically stable despite 2:1 flutter and minimally symptomatic  -Ideally we could have GI evaluate him and EGD done to r/o active bleed and determine whether he is a candidate for limited OAC  -If so he is otherwise young and this would be a recurrent issue for him I think we could take him for flutter ablation next week once w/drawal symptoms are treated through  -However I think he will be difficult to rate control if we are fighting alcohol w/drawal, his anemia, and just a rapidly conducting av node given his age  -Reasonable to attempt aggressive rate control with IV dilt, IV dig, if we can get him 3:1 and ventricular rates < 100, GI may be able to do their evaluation    Silvino Mcmullen MD

## 2021-03-27 NOTE — ASSESSMENT & PLAN NOTE
Recent started heart racing  A flutter with 2:1 condution in EGK  -200bpm    Metoprolol 25mg [id number]  Digoxin 500mcg loading  telemonitor  Echo  Cardio consult on board  Recommend GI consult before AC can be started

## 2021-03-27 NOTE — ED NOTES
Pt brought back from triage. Son at the bedside. A&Ox3, unable to tell me why he is at the hospital. IV placed and labs drawn.

## 2021-03-27 NOTE — ASSESSMENT & PLAN NOTE
Likely secondary to recent GI bleeding    With esophageal varicose? Per patient, on PPI, not compliant.  AMAed in 6/2020 for GI bleeding, no egd was done.   GI consult Dr Sanchez, recommended to CALL digestive health BACK when patient is stable to do EGD.   Repeat Hb 6.8, 1 unit PRBC

## 2021-03-27 NOTE — CARE PLAN
Problem: Safety  Goal: Will remain free from injury  Outcome: PROGRESSING AS EXPECTED  Note: Bed in locked and lowest position. Three side rails up. Call light within reach. Patient instructed on use of call bell and how to call for assistance. Threaded socks in use. Objects in room cleared for ambulation. Bed alarm in use.       Problem: Venous Thromboembolism (VTW)/Deep Vein Thrombosis (DVT) Prevention:  Goal: Patient will participate in Venous Thrombosis (VTE)/Deep Vein Thrombosis (DVT)Prevention Measures  Outcome: PROGRESSING AS EXPECTED  Note: JASON hose in place. Pt encouraged to mobilize and walk as tolderated to prevent blood clot formation in lower extremities.

## 2021-03-27 NOTE — PROGRESS NOTES
Patient seen by Cardiologist. Remained in a Atrial flutter with RVR. Rapid response initiated. Patient given Metoprolol 25 mg PO. Placed on portable monitor. EKG done. Rapid RN administered metoprolol 5 mg IVP. Heart rate decreased to the 120's. Patient had order to be transferred to [facility], then changed to Telemetry. Patient was awake and alert with SBP in the 120's. Patient was transferred to the telemetry unit at 1040.

## 2021-03-27 NOTE — ED NOTES
Med Rec completed per patient at bedside  Allergies reviewed  No ORAL antibiotics in last 14 days    Preferred Pharmacy: [facility] in [place]

## 2021-03-27 NOTE — ED TRIAGE NOTES
"Chief Complaint   Patient presents with   • Altered Mental Status     Pt having difficulty maintaining arousal and following commands   • Jaundice     Pt sclera of eyes and skin yellow upon assesment   • Sent by MD     Pt notified by MD that his hemoglobin was \"low\" at [facility]       Pt via wheelchair to triage for above complaint. Pt states he has a history of liver cirrhosis. Pt denies any s/s of bleeding. Pt tachycardic and altered in triage.  Educated on triage process, encourage to inform staff of any changes.     /99   Pulse (!) 141   Temp 36 °C (96.8 °F) (Temporal)   Resp 17   Ht 1.829 m (6')   Wt 117 kg (257 lb 8 oz)   SpO2 99%   BMI 34.92 kg/m²     "

## 2021-03-27 NOTE — ED PROVIDER NOTES
"ED Provider Note     Scribed for Debora Moore D.O. by Jeremiah Ruiz. 3/26/2021, 11:40 PM.     Primary care provider: MARIAMA Frausto  Means of arrival: Walk in         History obtained from: Patient  History limited by: None    CHIEF COMPLAINT  Chief Complaint   Patient presents with   • Altered Mental Status     Pt having difficulty maintaining arousal and following commands   • Jaundice     Pt sclera of eyes and skin yellow upon assesment   • Sent by MD     Pt notified by MD that his hemoglobin was \"low\" at [facility] in [facility]  Enrique Drake is a 45 y.o. male who presents to the emergency Department with moderate abdominal and lower extremity swelling onset 1 week ago. Per the patient, his abdomen has been feeling \"tight\" and has been swelling \"like a balloon\"; he has a history of alcoholic cirrhosis but has not had any alcohol for a year. He states that last week he went to the [facility] urgent care in [place] to retreive a prescription for antacids and also because he feeling lightheaded; they called him today and recommended he come to the ED for low hemoglobin levels. His brother notes that when he was getting him ready to go to the ED, he seemed sleepy, lethargic, and was stumbling. The patient reports additional symptoms of jaundice, and denies hematemesis, melena, hematochezia, vomiting, or nausea. No other exacerbating or alleviating factors were noted. He also denies taking medications for his liver, and adds that he does smoke. He does not see a GI specialist        REVIEW OF SYSTEMS  Pertinent positives include altered mental status, moderate abdominal and lower extremity swelling, lightheadedness, jaundice, low hemoglobin levels. Pertinent negatives include no hematemesis, melena, hematochezia, vomiting, or nausea.   See HPI for further details. All other systems are negative.    PAST MEDICAL HISTORY  Past history of cirrhosis    FAMILY HISTORY  Family History   Problem " Relation Age of Onset   • Other Mother         smoker   • Lung Disease Mother         COPD   • Diabetes Father    • Other Brother         gout   • Heart Attack Maternal Grandfather    • Other Brother         epilepsy       SOCIAL HISTORY  Social History     Tobacco Use   • Smoking status: Current Every Day Smoker     Packs/day: 1.00     Years: 20.00     Pack years: 20.00     Types: Cigarettes     Last attempt to quit: 3/3/2013     Years since quittin.0   • Smokeless tobacco: Former User     Types: Chew     Quit date: 3/4/2012   • Tobacco comment: chewed for 10 years   Substance Use Topics   • Alcohol use: Not Currently     Alcohol/week: 21.0 oz     Types: 42 Cans of beer per week   • Drug use: Yes     Types: Marijuana     Comment: at night prn      Social History     Substance and Sexual Activity   Drug Use Yes   • Types: Marijuana    Comment: at night prn       SURGICAL HISTORY  None noted    CURRENT MEDICATIONS    Current Facility-Administered Medications:   •  pantoprazole (PROTONIX) EC tablet 40 mg, 40 mg, Oral, DAILY, Alfonso Contreras M.D.  •  senna-docusate (PERICOLACE or SENOKOT S) 8.6-50 MG per tablet 2 tablet, 2 tablet, Oral, BID **AND** polyethylene glycol/lytes (MIRALAX) PACKET 1 Packet, 1 Packet, Oral, QDAY PRN **AND** magnesium hydroxide (MILK OF MAGNESIA) suspension 30 mL, 30 mL, Oral, QDAY PRN **AND** bisacodyl (DULCOLAX) suppository 10 mg, 10 mg, Rectal, QDAY PRN, Alfonso Contreras M.D.  •  labetalol (NORMODYNE/TRANDATE) injection 10 mg, 10 mg, Intravenous, Q4HRS PRN, Alfonso Contreras M.D.  •  phytonadione (AQUA-MEPHYTON) 10 mg in NS 50 mL IVPB, 10 mg, Intravenous, Once, Alfonso Contreras M.D.  •  potassium chloride SA (Kdur) tablet 20 mEq, 20 mEq, Oral, DAILY, Alfonso Contreras M.D.  •  spironolactone (ALDACTONE) tablet 25 mg, 25 mg, Oral, Q DAY, Alfonso Contreras M.D.  •  famotidine (PEPCID) injection 20 mg, 20 mg, Intravenous, Once, Debora Moore D.O.    Current Outpatient Medications:   •  ondansetron  (ZOFRAN ODT) 4 MG TABLET DISPERSIBLE, Take 1 Tab by mouth every 6 hours as needed., Disp: 10 Tab, Rfl: 0  •  pantoprazole (PROTONIX) 40 MG Tablet Delayed Response, Take 1 Tab by mouth every day., Disp: 90 Tab, Rfl: 0    ALLERGIES  Allergies   Allergen Reactions   • Nkda [No Known Drug Allergy]        PHYSICAL EXAM  VITAL SIGNS: /99   Pulse (!) 141   Temp 36 °C (96.8 °F) (Temporal)   Resp 17   Ht 1.829 m (6')   Wt 117 kg (257 lb 8 oz)   SpO2 99%   BMI 34.92 kg/m²     Constitutional: Patient is a very large non-toxic appearing male. Awake and alert but somewhat disoriented   HENT: Normocephalic, atraumatic, Nose normal with no mucosal edema or drainage. Dry oral mucosa   Eyes: PERRL, EOMI, Mild scleral icterus  Neck: Supple with no cervical adenopathy. Normal range of motion in flexion, extension and lateral rotation.   Lymphatic: No lymphadenopathy noted.   Cardiovascular: Tachycardic heart rate and Regular rhythm. No murmur  Thorax & Lungs: Clear and equal breath sounds with good excursion. No respiratory distress, no rhonchi, wheezing or rales.   Abdomen: Tympanic bowel sounds. Soft, Distended, nontender, No rebound , guarding, or palpable masses.   Skin: Mildly jaundiced, Warm, Dry, No rashes.   Back: No cervical, thoracic, or lumbosacral tenderness.   Extremities: Peripheral pulses 4/4 No edema, 3 + edema in bilateral extremities  Musculoskeletal: Normal range of motion in all major joints.   Neurologic: Awake and alert but somewhat disoriented. Normal motor function, Normal sensory function, No lateralizing or focal deficits noted. DTR's 4/4 bilaterally.  Psychiatric: Affect normal, Judgment normal, Mood normal.     DIAGNOSTICS/PROCEDURES    LABS  Results for orders placed or performed during the hospital encounter of 03/26/21   CBC WITH DIFFERENTIAL   Result Value Ref Range    WBC 7.3 4.8 - 10.8 K/uL    RBC 3.95 (L) 4.70 - 6.10 M/uL    Hemoglobin 7.6 (L) 14.0 - 18.0 g/dL    Hematocrit 28.4 (L) 42.0  - 52.0 %    MCV 71.9 (L) 81.4 - 97.8 fL    MCH 19.2 (L) 27.0 - 33.0 pg    MCHC 26.8 (L) 33.7 - 35.3 g/dL    RDW 62.0 (H) 35.9 - 50.0 fL    Platelet Count 51 (L) 164 - 446 K/uL    Neutrophils-Polys 58.60 44.00 - 72.00 %    Lymphocytes 25.20 22.00 - 41.00 %    Monocytes 8.10 0.00 - 13.40 %    Eosinophils 4.50 0.00 - 6.90 %    Basophils 3.60 (H) 0.00 - 1.80 %    Nucleated RBC 0.00 /100 WBC    Neutrophils (Absolute) 4.28 1.82 - 7.42 K/uL    Lymphs (Absolute) 1.84 1.00 - 4.80 K/uL    Monos (Absolute) 0.59 0.00 - 0.85 K/uL    Eos (Absolute) 0.33 0.00 - 0.51 K/uL    Baso (Absolute) 0.26 (H) 0.00 - 0.12 K/uL    NRBC (Absolute) 0.00 K/uL    Hypochromia 1+     Anisocytosis 2+ (A)     Macrocytosis 1+     Microcytosis 1+    COMP METABOLIC PANEL   Result Value Ref Range    Sodium 140 135 - 145 mmol/L    Potassium 3.5 (L) 3.6 - 5.5 mmol/L    Chloride 107 96 - 112 mmol/L    Co2 24 20 - 33 mmol/L    Anion Gap 9.0 7.0 - 16.0    Glucose 104 (H) 65 - 99 mg/dL    Bun 6 (L) 8 - 22 mg/dL    Creatinine 0.59 0.50 - 1.40 mg/dL    Calcium 8.0 (L) 8.5 - 10.5 mg/dL    AST(SGOT) 43 12 - 45 U/L    ALT(SGPT) 15 2 - 50 U/L    Alkaline Phosphatase 178 (H) 30 - 99 U/L    Total Bilirubin 3.7 (H) 0.1 - 1.5 mg/dL    Albumin 3.4 3.2 - 4.9 g/dL    Total Protein 6.6 6.0 - 8.2 g/dL    Globulin 3.2 1.9 - 3.5 g/dL    A-G Ratio 1.1 g/dL   LIPASE   Result Value Ref Range    Lipase 48 11 - 82 U/L   APTT   Result Value Ref Range    APTT 40.5 (H) 24.7 - 36.0 sec   PROTHROMBIN TIME (INR)   Result Value Ref Range    PT 19.1 (H) 12.0 - 14.6 sec    INR 1.55 (H) 0.87 - 1.13   AMMONIA   Result Value Ref Range    Ammonia 50 (H) 11 - 45 umol/L   SARS-COV Antigen STORM: Collect dry nasal swab AND NP swab in VTM   Result Value Ref Range    SARS-CoV-2 Source NP Swab     SARS-COV ANTIGEN STORM NotDetected Not-Detected   SARS-CoV-2 PCR (24 hour In-House): Collect NP swab in VTM    Specimen: Nasopharyngeal; Respirate   Result Value Ref Range    SARS-CoV-2 Source NP Swab     DIFFERENTIAL MANUAL   Result Value Ref Range    Manual Diff Status PERFORMED    PERIPHERAL SMEAR REVIEW   Result Value Ref Range    Peripheral Smear Review see below    PLATELET ESTIMATE   Result Value Ref Range    Plt Estimation Decreased    MORPHOLOGY   Result Value Ref Range    RBC Morphology Present     Polychromia 1+     Poikilocytosis 1+     Target Cells 1+    IMMATURE PLT FRACTION   Result Value Ref Range    Imm. Plt Fraction 19.5 (H) 0.6 - 13.1 K/uL   ESTIMATED GFR   Result Value Ref Range    GFR If African American >60 >60 mL/min/1.73 m 2    GFR If Non African American >60 >60 mL/min/1.73 m 2   EKG   Result Value Ref Range    Report       [facility] Emergency Dept.    Test Date:  2021  Pt Name:    TONG STAPLETON          Department: ER  MRN:        [id number]                      Room:  Gender:     Male                         Technician: [id number]  :        [id number]                   Requested By:ER TRIAGE PROTOCOL  Order #:    [id number]                    Reading MD:    Measurements  Intervals                                Axis  Rate:       137                          P:  TN:                                      QRS:        42  QRSD:       124                          T:          -22  QT:         304  QTc:        459    Interpretive Statements  A-FLUTTER W/ PREDOM 2:1 AV BLOCK, A-RATE 272  NONSPECIFIC INTRAVENTRICULAR CONDUCTION DELAY  Compared to ECG 06/15/2020 15:15:09  2:1 AV block now present  Intraventricular conduction delay now present  Sinus tachycardia no longer present         Labs reviewed by me    EKG  12 lead EKG interpreted by myself, see above    RADIOLOGY/PROCEDURES  CT-ABDOMEN-PELVIS WITH   Final Result      1.  Findings consistent with hepatic cirrhosis and portal hypertension.   2.  Probable small liver cyst.   3.  Diffuse mesenteric edema, likely related to liver disease.   4.  Mild wall thickening of ascending colon which may be due to liver disease or  colitis.   5.  Small bilateral pleural effusions and body wall edema.      DX-CHEST-PORTABLE (1 VIEW)   Final Result      Hypoinflation without other evidence for acute cardiopulmonary disease.        Results and radiologist interpretation reviewed by me.     COURSE & MEDICAL DECISION MAKING  Pertinent Labs & Imaging studies reviewed. (See chart for details)    11:40 PM - Patient seen and evaluated at bedside. Ordered for DX-chest, CT-abdomen-pelvis w/, CBC w/diff, CMP, Lipase, APTT, Prothrombin time (INR), Ammonia, and an EKG to evaluate. Patient will be treated with Pepcid 20 mg for his symptoms. Differential diagnoses include, but are not limited to, Hepatic encephalopathy   Laboratories were grossly abnormal with a white count of 7.3, hemoglobin 7.6 and hematocrit of 28.4.  Liver enzymes are mildly elevated, PT/INR elevated, serum ammonia level is 50, lipase is normal.  Chest x-ray shows no acute changes, CT abdomen and pelvis with IV contrast shows hepatic cirrhosis and portal hypertension, diffuse mesenteric edema and thickening of the ascending colon, bilateral pleural effusions.    12:51 AM - Paged the hospitalist for consult.     1:13 AM - I discussed the patient's case and the above findings with Dr. Contreras (Hospitalist) who agreed to hospitalize the patient      DISPOSITION:  Patient will be hospitalized by Dr. Contreras in guarded condition.    FINAL IMPRESSION  1. Acute hepatic encephalopathy    2. Confusion    3. Jaundice    4. Alcoholic liver disease (HCC)    5. Symptomatic anemia    6. Abdominal distention    7. Bilateral lower extremity edema         Jeremiah HIGGINS), am scribing for, and in the presence of, Debora Moore D.O..    Electronically signed by: Jeremiah Ruiz (Jim), 3/26/2021    Debora HIGGINS D.O. personally performed the services described in this documentation, as scribed by Jeremiah Ruiz in my presence, and it is both accurate and complete.    C    The note accurately  reflects work and decisions made by me.  Debora Moore D.O.  3/27/2021  6:10 AM

## 2021-03-27 NOTE — HOSPITAL COURSE
45 y.o. male with history of alcohol dependence and alcoholic cirrhosis who presented to urgent care with abdominal pain and confusion 3/26/2021 he is referred to [facility] for evaluation.  In the emergency department he is found to have persistent symptoms.  Biochemical work-up includes anemia, thrombocytopenia, coagulopathy with INR 1.55, hypokalemia, ammonia 50 and a CT abdomen pelvis concerning for mesenteric edema and colitis.  Alcohol level 140s, he was put on ciwa protocol and transfer to [facility]. Ekg on admission shows a flutter with 2:1 conduction, HR range 130-220. Cardiology consulted. Pt also has recent GI bleeding in 6/2020. EGD did not done because patient sign out AMA. Pt is on PPI but, not compliant. GI consulted again since if need ablation, patient will need to be on AC which will increase his risk of bleeding.

## 2021-03-27 NOTE — PROGRESS NOTES
2 RN Skin Check    2 RN skin check complete with ANA Barrera. Pt is a new admit.   Devices in place: PIV in right AC   Skin assessed under devices: yes.  Confirmed pressure ulcers found on: NA.  New potential pressure ulcers noted on NA. Wound consult placed No.  The following interventions in place: on pressure redistribution mattress, pillows used for support and repositioning, moisturizer, encouraging mobility and turns    Pt skin and bony prominences intact. Pt has intact but excoriated bottom. Pt also has redness on abdomen.

## 2021-03-27 NOTE — ASSESSMENT & PLAN NOTE
Monitor for alcohol withdrawal at high risk   No prior episodes  Alcohol level on admission: 147  MVI and Thiamine  PRN lorazepam and precedex. Scheduled lorazepam discontinued.

## 2021-03-27 NOTE — PROGRESS NOTES
Hospital Medicine Daily Progress Note    Date of Service  3/27/2021    Chief Complaint  45 y.o. male admitted 3/26/2021 with confusion    Hospital Course  45 y.o. male with history of alcohol dependence and alcoholic cirrhosis who presented to urgent care with abdominal pain and confusion 3/26/2021 he is referred to [facility] for evaluation.  In the emergency department he is found to have persistent symptoms.  Biochemical work-up includes anemia, thrombocytopenia, coagulopathy with INR 1.55, hypokalemia, ammonia 50 and a CT abdomen pelvis concerning for mesenteric edema and colitis.  Alcohol level 140s, he was put on ciwa protocol and transfer to tele. Ekg on admission shows a flutter with 2:1 conduction, HR range 130-220. Cardiology consulted. Pt also has recent GI bleeding in 6/2020. EGD did not done because patient sign out AMA. Pt is on PPI but, not compliant. GI consulted again since if need ablation, patient will need to be on AC which will increase his risk of bleeding.       Interval Problem Update  Denies complaint, AAOx4, No tenderness on abd, no N/V, denies bloody stool or black stool recently, drinks daily.    Has heart racing, which recently started per patient.     Tele monitor for alcohol withdraw and HR control  Gi consult for h/o GI bleeding Dr Sanchez (digestive) recommend to call them again once patient is stable to have EGD      Consultants/Specialty  Cardiology  GI  ICU    Code Status  Full Code    Disposition  pending    Review of Systems  Review of Systems   Constitutional: Negative for chills, fever, malaise/fatigue and weight loss.   HENT: Negative for congestion and sore throat.    Eyes: Negative for discharge and redness.   Respiratory: Negative for cough, shortness of breath and wheezing.    Cardiovascular: Positive for palpitations. Negative for chest pain and leg swelling.   Gastrointestinal: Negative for abdominal pain, constipation, diarrhea, heartburn, nausea  and vomiting.   Genitourinary: Negative for dysuria, frequency and urgency.   Musculoskeletal: Negative for back pain, falls and joint pain.   Skin: Negative for itching and rash.   Neurological: Negative for dizziness, tingling, sensory change, focal weakness and headaches.   Psychiatric/Behavioral: Negative for depression, hallucinations and substance abuse. The patient is not nervous/anxious and does not have insomnia.    All other systems reviewed and are negative.       Physical Exam  Temp:  [36 °C (96.8 °F)-36.7 °C (98.1 °F)] 36.7 °C (98.1 °F)  Pulse:  [] 74  Resp:  [14-23] 16  BP: (103-161)/(64-99) 131/77  SpO2:  [93 %-99 %] 93 %    Physical Exam  Vitals and nursing note reviewed.   Constitutional:       General: He is not in acute distress.     Appearance: Normal appearance. He is obese.   HENT:      Head: Normocephalic and atraumatic.      Nose: Nose normal. No congestion or rhinorrhea.      Mouth/Throat:      Pharynx: Oropharynx is clear. No posterior oropharyngeal erythema.   Eyes:      Extraocular Movements: Extraocular movements intact.      Conjunctiva/sclera: Conjunctivae normal.      Pupils: Pupils are equal, round, and reactive to light.   Neck:      Vascular: No carotid bruit.   Cardiovascular:      Rate and Rhythm: Normal rate and regular rhythm.      Pulses: Normal pulses.      Heart sounds: Normal heart sounds.   Pulmonary:      Effort: Pulmonary effort is normal. No respiratory distress.      Breath sounds: Normal breath sounds. No wheezing.   Chest:      Chest wall: No tenderness.   Abdominal:      General: Abdomen is flat. Bowel sounds are normal. There is no distension.      Palpations: Abdomen is soft.      Tenderness: There is no abdominal tenderness. There is no guarding or rebound.   Musculoskeletal:         General: Normal range of motion.      Cervical back: Normal range of motion and neck supple.      Left lower leg: No edema.   Skin:     General: Skin is warm.   Neurological:       General: No focal deficit present.      Mental Status: He is alert and oriented to person, place, and time.      Cranial Nerves: No cranial nerve deficit.      Motor: No weakness.      Gait: Gait normal.      Deep Tendon Reflexes: Reflexes normal.   Psychiatric:         Mood and Affect: Mood normal.         Behavior: Behavior normal.         Judgment: Judgment normal.         Fluids    Intake/Output Summary (Last 24 hours) at 3/27/2021 1000  Last data filed at 3/27/2021 0441  Gross per 24 hour   Intake --   Output 520 ml   Net -520 ml       Laboratory  Recent Labs     03/24/21  1345 03/26/21  2330   WBC 6.0 7.3   RBC 3.79* 3.95*   HEMOGLOBIN 7.3* 7.6*   HEMATOCRIT 27.0* 28.4*   MCV 71.2* 71.9*   MCH 19.3* 19.2*   MCHC 27.0* 26.8*   RDW 60.2* 62.0*   PLATELETCT  --  51*     Recent Labs     03/24/21  1345 03/26/21  2330   SODIUM 141 140   POTASSIUM 3.9 3.5*   CHLORIDE 109 107   CO2 22 24   GLUCOSE 123* 104*   BUN 9 6*   CREATININE 0.57 0.59   CALCIUM 8.3* 8.0*     Recent Labs     03/26/21  2330   APTT 40.5*   INR 1.55*         Recent Labs     03/24/21  1345   TRIGLYCERIDE 93   HDL 54   LDL 55       Imaging  CT-ABDOMEN-PELVIS WITH   Final Result      1.  Findings consistent with hepatic cirrhosis and portal hypertension.   2.  Probable small liver cyst.   3.  Diffuse mesenteric edema, likely related to liver disease.   4.  Mild wall thickening of ascending colon which may be due to liver disease or colitis.   5.  Small bilateral pleural effusions and body wall edema.      DX-CHEST-PORTABLE (1 VIEW)   Final Result      Hypoinflation without other evidence for acute cardiopulmonary disease.      EC-ECHOCARDIOGRAM COMPLETE W/O CONT    (Results Pending)        Assessment/Plan  * Chronic alcohol abuse  Assessment & Plan  alcohol level 149    ciwa protocol started  Tele monitor      Atrial flutter by electrocardiogram (HCC)  Assessment & Plan  Recent started heart racing  A flutter with 2:1 condution in EGK  HR  130-200bpm    Metoprolol 25mg [id number]  Digoxin 500mcg loading  telemonitor  Echo  Cardio consult on board  Recommend GI consult before AC can be started    Anemia  Assessment & Plan  Likely secondary to recent GI bleeding    With esophageal varicose? Per patient, on PPI, not compliant.  AMAed in 6/2020 for GI bleeding, no egd was done.   GI consult Dr Sanchez, recommended to CALL digestive health BACK when patient is stable to do EGD.   Repeat Hb 6.8, 1 unit PRBC    Alcoholic cirrhosis of liver without ascites (HCC)- (present on admission)  Assessment & Plan  Due to chronic alcohol use  With esophageal varicose, on PPI, not compliant.  AMAed in 6/2020 for GI bleeding, no egd was done.     Sleep apnea  Assessment & Plan  Aware of diagnosis, can not get CPAP machine in his town    CPAP per RT    Hypokalemia  Assessment & Plan  Supplemented  recheck    Acute alcoholic gastritis without hemorrhage  Assessment & Plan  Protonix, follow-up CBC    Hepatic encephalopathy (HCC)  Assessment & Plan  Lactulose, Rocephin  Follow-up urine drug screen    Thrombocytopenia (HCC)  Assessment & Plan  Suspect recurrence of alcoholism   hold DVT prophylaxis, follow-up CBC    Coagulopathy (HCC)  Assessment & Plan  No active bleed, vitamin K ordered    Tobacco abuse- (present on admission)  Assessment & Plan  1 pack/day, patient unreceptive to nicotine cessation counseling, nicotine replacement ordered       VTE prophylaxis: scd

## 2021-03-27 NOTE — PROGRESS NOTES
Assumed care at 0355. Patient is awake, but fatigued. Alert and oriented x3, disoriented to situation. Pt on room air.  in place. PIV infusing 1/2 bag of NS @ 250 ml/hr. Assessment completed. Bed locked in lowest position. Call light within reach. Whiteboard updates with nurse's and CNA's numbers. Hourly rounding in place. Bed alarm in place.

## 2021-03-27 NOTE — CONSULTS
Critical Care Consultation    Date of consult: 3/27/2021    Referring Physician  Maik Barrett M.D.    Reason for Consultation  Alcohol withdrawal and afib w/ RVR    History of Presenting Illness  45 y.o. male who presented 3/26/2021 with Hx of alcohol cirrhosis, previous GI bleed left prior to EGD in past. He presented to urgent care for confusion. He was found to have hypokalemia, anemia thrombocytopenia, INR of 1.55 ct abdomen with mesenteric edema. He was admitted to hospital. I was called this morning for ICU consultation for aflutter and also concerns of alcohol withdrawal. He was mentation normally and provided full history. He denied chest pain, prior palpitation or alcohol withdrawal. No black or bloody stool. He ran out of his prescription for PPI and got over the counter PPI. He was being seen by Dr Mcmullen for his aflutter at the time was given metoprolol 5mg IV with rates in the 100-130's. His electrolytes were not replaced and am labs were still pending. Discussed with Dr Barrett and Dr Mcmullen and rapid response team at bedside.       Code Status  Full Code    Review of Systems  Review of Systems   Constitutional: Negative for chills, fever and malaise/fatigue.   HENT: Negative for ear discharge and sinus pain.    Eyes: Negative for double vision and discharge.   Respiratory: Negative for cough, sputum production and shortness of breath.    Cardiovascular: Negative for chest pain, palpitations, claudication and leg swelling.   Gastrointestinal: Positive for heartburn. Negative for abdominal pain, blood in stool, constipation, diarrhea, nausea and vomiting.   Genitourinary: Negative for dysuria, frequency and hematuria.   Musculoskeletal: Negative for back pain and myalgias.   Neurological: Negative for dizziness, tingling, tremors, sensory change, speech change, focal weakness, seizures, weakness and headaches.        Described confusion last night but improved this am   Psychiatric/Behavioral: Positive for substance  abuse. Negative for depression, hallucinations and suicidal ideas. The patient is not nervous/anxious and does not have insomnia.        Past Medical History   has no past medical history of ASTHMA or Diabetes.    Surgical History   has no past surgical history on file.    Family History  family history includes Diabetes in his father; Heart Attack in his maternal grandfather; Lung Disease in his mother; Other in his brother, brother, and mother.    Social History   reports that he has been smoking cigarettes. He has a 20.00 pack-year smoking history. He quit smokeless tobacco use about 9 years ago.  His smokeless tobacco use included chew. He reports previous alcohol use of about 21.0 oz of alcohol per week. He reports current drug use. Drug: Marijuana.    Medications  Home Medications     Reviewed by Hilario Dumont Out, T (Pharmacy [facility]) on 03/27/21 at 0152  Med List Status: Complete   Medication Last Dose Status   Ascorbic Acid (VITAMIN C GUMMIE PO) 3/26/2021 Active   omeprazole (PRILOSEC) 20 MG delayed-release capsule 3/19/2021 Active   ondansetron (ZOFRAN ODT) 4 MG TABLET DISPERSIBLE Not Taking Active   pantoprazole (PROTONIX) 40 MG Tablet Delayed Response Not Taking Active              Current Facility-Administered Medications   Medication Dose Route Frequency Provider Last Rate Last Admin   • senna-docusate (PERICOLACE or SENOKOT S) 8.6-50 MG per tablet 2 tablet  2 tablet Oral BID Alfonso Contreras M.D.        And   • polyethylene glycol/lytes (MIRALAX) PACKET 1 Packet  1 Packet Oral QDAY PRN Alfonso Contreras M.D.        And   • magnesium hydroxide (MILK OF MAGNESIA) suspension 30 mL  30 mL Oral QDAY PRN Alfonso Contreras M.D.        And   • bisacodyl (DULCOLAX) suppository 10 mg  10 mg Rectal QDAY PRN Alfonso Contreras M.D.       • labetalol (NORMODYNE/TRANDATE) injection 10 mg  10 mg Intravenous Q4HRS PRN Alfonso Contreras M.D.       • NS infusion 2,000 mL  2,000 mL Intravenous Continuous Alfonso Contreras  M.D. 250 mL/hr at 03/27/21 0355 2,000 mL at 03/27/21 0355   • pantoprazole (PROTONIX) injection 40 mg  40 mg Intravenous BID Alfonso Contreras M.D.   40 mg at 03/27/21 0512   • nicotine (NICODERM) 21 MG/24HR 21 mg  21 mg Transdermal Daily-0600 Alfonso Contreras M.D.   21 mg at 03/27/21 0511    And   • nicotine polacrilex (NICORETTE) 2 MG piece 2 mg  2 mg Oral Q HOUR PRN Alfnoso Contreras M.D.       • [START ON 3/28/2021] potassium chloride SA (Kdur) tablet 40 mEq  40 mEq Oral DAILY Maik Barrett M.D.       • LORazepam (ATIVAN) tablet 0.5 mg  0.5 mg Oral Q4HRS PRN Maik Barrett M.D.       • LORazepam (ATIVAN) tablet 1 mg  1 mg Oral Q4HRS PRN Maik Barrett M.D.        Or   • LORazepam (ATIVAN) injection 0.5 mg  0.5 mg Intravenous Q4HRS PRN Maik Barrett M.D.       • LORazepam (ATIVAN) tablet 2 mg  2 mg Oral Q2HRS PRN Maik Barrett M.D.        Or   • LORazepam (ATIVAN) injection 1 mg  1 mg Intravenous Q2HRS PRN Maik Barrett M.D.       • LORazepam (ATIVAN) tablet 3 mg  3 mg Oral Q HOUR PRN Maik Barrett M.D.        Or   • LORazepam (ATIVAN) injection 1.5 mg  1.5 mg Intravenous Q HOUR PRN Maik Barrett M.D.       • LORazepam (ATIVAN) tablet 4 mg  4 mg Oral Q15 MIN PRN Maik Barrett M.D.        Or   • LORazepam (ATIVAN) injection 2 mg  2 mg Intravenous Q15 MIN PRN Maik Barrett M.D.       • chlordiazePOXIDE (LIBRIUM) capsule 50 mg  50 mg Oral Q6HRS Maik Barrett M.D.        Followed by   • [START ON 3/28/2021] chlordiazePOXIDE (LIBRIUM) capsule 25 mg  25 mg Oral Q6HRS Maik Barrett M.D.       • detox IV 1000 mL (D5LR + magnesium 1 g + thiamine 100 mg + folic acid 1 mg) infusion   Intravenous Once Maik Barrett M.D.        And   • [START ON 3/28/2021] thiamine (Vitamin B-1) tablet 100 mg  100 mg Oral DAILY Maik Barrett M.D.        And   • [START ON 3/28/2021] multivitamin (THERAGRAN) tablet 1 tablet  1 tablet Oral DAILY Maik Barrett M.D.        And   • [START ON 3/28/2021] folic acid (FOLVITE) tablet 1 mg  1 mg Oral DAILY Maik Barrett M.D.       • cloNIDine (CATAPRES)  tablet 0.1 mg  0.1 mg Oral Q HOUR PRN Maik Barrett M.D.       • Metoprolol Tartrate (LOPRESSOR) injection 5 mg  5 mg Intravenous Q5 MIN PRN Maik Barrett M.D.   5 mg at 03/27/21 1018   • magnesium sulfate IVPB premix 4 g  4 g Intravenous Once Maik Barrett M.D.       • metoprolol tartrate (LOPRESSOR) tablet 25 mg  25 mg Oral Q6HRS Silvino Mcmullen M.D.       • digoxin (LANOXIN) injection 500 mcg  500 mcg Intravenous Once Silvino Mcmullen M.D.           Allergies  No Known Allergies    Vital Signs last 24 hours  Temp:  [36 °C (96.8 °F)-37 °C (98.6 °F)] 37 °C (98.6 °F)  Pulse:  [] 126  Resp:  [14-23] 17  BP: (103-161)/(64-99) 137/94  SpO2:  [93 %-99 %] 96 %    Physical Exam  Physical Exam  Vitals and nursing note reviewed.   Constitutional:       General: He is not in acute distress.     Appearance: Normal appearance. He is ill-appearing.      Comments: Well appearing male sitting up in bed with mouth closed no acute distress   HENT:      Head: Normocephalic.      Mouth/Throat:      Mouth: Mucous membranes are dry.   Eyes:      Pupils: Pupils are equal, round, and reactive to light.   Cardiovascular:      Rate and Rhythm: Tachycardia present.      Heart sounds: No murmur. No friction rub.   Pulmonary:      Effort: No respiratory distress.      Breath sounds: No stridor. No wheezing or rhonchi.   Abdominal:      General: There is no distension.      Palpations: There is no mass.      Tenderness: There is no abdominal tenderness. There is no guarding or rebound.      Hernia: No hernia is present.   Musculoskeletal:         General: No swelling or tenderness.      Cervical back: No rigidity or tenderness.   Skin:     Coloration: Skin is not jaundiced or pale.   Neurological:      Mental Status: He is alert and oriented to person, place, and time.      Cranial Nerves: No cranial nerve deficit.      Sensory: No sensory deficit.      Motor: No weakness.      Coordination: Coordination normal.      Comments: Very fine tremor ?    Psychiatric:         Mood and Affect: Mood normal.         Fluids    Intake/Output Summary (Last 24 hours) at 3/27/2021 1127  Last data filed at 3/27/2021 0441  Gross per 24 hour   Intake --   Output 520 ml   Net -520 ml       Laboratory  Recent Results (from the past 48 hour(s))   SARS-CoV-2 PCR (24 hour In-House): Collect NP swab in VTM    Collection Time: 21 12:40 AM    Specimen: Nasopharyngeal; Respirate   Result Value Ref Range    SARS-CoV-2 Source NP Swab     SARS-CoV-2 by PCR NotDetected    EKG    Collection Time: 21 11:07 PM   Result Value Ref Range    Report       [facility] Emergency Dept[facility]    Test Date:  2021  Pt Name:    TONG STAPLETON          Department: ER  MRN:        [id number]                      Room:  Gender:     Male                         Technician: [id number]  :        1975                   Requested By:ER TRIAGE PROTOCOL  Order #:    [id number]                    Reading MD:    Measurements  Intervals                                Axis  Rate:       137                          P:  LA:                                      QRS:        42  QRSD:       124                          T:          -22  QT:         304  QTc:        459    Interpretive Statements  A-FLUTTER W/ PREDOM 2:1 AV BLOCK, A-RATE 272  NONSPECIFIC INTRAVENTRICULAR CONDUCTION DELAY  Compared to ECG 06/15/2020 15:15:09  2:1 AV block now present  Intraventricular conduction delay now present  Sinus tachycardia no longer present     AMMONIA    Collection Time: 21 11:28 PM   Result Value Ref Range    Ammonia 50 (H) 11 - 45 umol/L   CBC WITH DIFFERENTIAL    Collection Time: 21 11:30 PM   Result Value Ref Range    WBC 7.3 4.8 - 10.8 K/uL    RBC 3.95 (L) 4.70 - 6.10 M/uL    Hemoglobin 7.6 (L) 14.0 - 18.0 g/dL    Hematocrit 28.4 (L) 42.0 - 52.0 %    MCV 71.9 (L) 81.4 - 97.8 fL    MCH 19.2 (L) 27.0 - 33.0 pg    MCHC 26.8 (L) 33.7 - 35.3 g/dL    RDW 62.0 (H) 35.9 - 50.0 fL     Platelet Count 51 (L) 164 - 446 K/uL    Neutrophils-Polys 58.60 44.00 - 72.00 %    Lymphocytes 25.20 22.00 - 41.00 %    Monocytes 8.10 0.00 - 13.40 %    Eosinophils 4.50 0.00 - 6.90 %    Basophils 3.60 (H) 0.00 - 1.80 %    Nucleated RBC 0.00 /100 WBC    Neutrophils (Absolute) 4.28 1.82 - 7.42 K/uL    Lymphs (Absolute) 1.84 1.00 - 4.80 K/uL    Monos (Absolute) 0.59 0.00 - 0.85 K/uL    Eos (Absolute) 0.33 0.00 - 0.51 K/uL    Baso (Absolute) 0.26 (H) 0.00 - 0.12 K/uL    NRBC (Absolute) 0.00 K/uL    Hypochromia 1+     Anisocytosis 2+ (A)     Macrocytosis 1+     Microcytosis 1+    COMP METABOLIC PANEL    Collection Time: 03/26/21 11:30 PM   Result Value Ref Range    Sodium 140 135 - 145 mmol/L    Potassium 3.5 (L) 3.6 - 5.5 mmol/L    Chloride 107 96 - 112 mmol/L    Co2 24 20 - 33 mmol/L    Anion Gap 9.0 7.0 - 16.0    Glucose 104 (H) 65 - 99 mg/dL    Bun 6 (L) 8 - 22 mg/dL    Creatinine 0.59 0.50 - 1.40 mg/dL    Calcium 8.0 (L) 8.5 - 10.5 mg/dL    AST(SGOT) 43 12 - 45 U/L    ALT(SGPT) 15 2 - 50 U/L    Alkaline Phosphatase 178 (H) 30 - 99 U/L    Total Bilirubin 3.7 (H) 0.1 - 1.5 mg/dL    Albumin 3.4 3.2 - 4.9 g/dL    Total Protein 6.6 6.0 - 8.2 g/dL    Globulin 3.2 1.9 - 3.5 g/dL    A-G Ratio 1.1 g/dL   LIPASE    Collection Time: 03/26/21 11:30 PM   Result Value Ref Range    Lipase 48 11 - 82 U/L   APTT    Collection Time: 03/26/21 11:30 PM   Result Value Ref Range    APTT 40.5 (H) 24.7 - 36.0 sec   PROTHROMBIN TIME (INR)    Collection Time: 03/26/21 11:30 PM   Result Value Ref Range    PT 19.1 (H) 12.0 - 14.6 sec    INR 1.55 (H) 0.87 - 1.13   DIFFERENTIAL MANUAL    Collection Time: 03/26/21 11:30 PM   Result Value Ref Range    Manual Diff Status PERFORMED    PERIPHERAL SMEAR REVIEW    Collection Time: 03/26/21 11:30 PM   Result Value Ref Range    Peripheral Smear Review see below    PLATELET ESTIMATE    Collection Time: 03/26/21 11:30 PM   Result Value Ref Range    Plt Estimation Decreased    MORPHOLOGY    Collection  Time: 21 11:30 PM   Result Value Ref Range    RBC Morphology Present     Polychromia 1+     Poikilocytosis 1+     Target Cells 1+    IMMATURE PLT FRACTION    Collection Time: 21 11:30 PM   Result Value Ref Range    Imm. Plt Fraction 19.5 (H) 0.6 - 13.1 K/uL   ESTIMATED GFR    Collection Time: 21 11:30 PM   Result Value Ref Range    GFR If African American >60 >60 mL/min/1.73 m 2    GFR If Non African American >60 >60 mL/min/1.73 m 2   SARS-COV Antigen STORM: Collect dry nasal swab AND NP swab in VTM    Collection Time: 21 12:41 AM   Result Value Ref Range    SARS-CoV-2 Source NP Swab     SARS-COV ANTIGEN STORM NotDetected Not-Detected   Magnesium    Collection Time: 21  3:36 AM   Result Value Ref Range    Magnesium 1.7 1.5 - 2.5 mg/dL   PHOSPHORUS    Collection Time: 21  3:36 AM   Result Value Ref Range    Phosphorus 4.4 2.5 - 4.5 mg/dL   IRON/TOTAL IRON BIND    Collection Time: 21  3:36 AM   Result Value Ref Range    Iron 16 (L) 50 - 180 ug/dL    Total Iron Binding 402 250 - 450 ug/dL    Unsat Iron Binding 386 (H) 110 - 370 ug/dL    % Saturation 4 (L) 15 - 55 %   RETICULOCYTES COUNT    Collection Time: 21  3:36 AM   Result Value Ref Range    Reticulocyte Count 3.4 (H) 0.8 - 2.1 %    Retic, Absolute 0.13 (H) 0.04 - 0.06 M/uL    Imm. Reticulocyte Fraction 25.0 (H) 9.3 - 17.4 %    Retic Hgb Equivalent 16.5 (L) 29.0 - 35.0 pg/cell   DIAGNOSTIC ALCOHOL    Collection Time: 21  3:36 AM   Result Value Ref Range    Diagnostic Alcohol 147.6 (H) 0.0 - 10.0 mg/dL   EKG    Collection Time: 21 10:05 AM   Result Value Ref Range    Report       [facility]    Test Date:  2021  Pt Name:    TONG STAPLETON          Department: [facility]  MRN:        [id number]                      Room:       S530  Gender:     Male                         Technician: [id number]  :        1975                   Requested By:OSIEL BUCKLEY  Order #:    [id number]                    Reading  MD: Samy Rodarte MD    Measurements  Intervals                                Axis  Rate:       140                          P:          0  ME:         82                           QRS:        -37  QRSD:       100                          T:          22  QT:         332  QTc:        507    Interpretive Statements  ATRIAL FLUTTER WITH 2:1 AV BLOCK  PROLONGED QT INTERVAL  Electronically Signed On 3- 10:27:05 PDT by Samy Rodarte MD     CBC WITH DIFFERENTIAL    Collection Time: 03/27/21 10:25 AM   Result Value Ref Range    WBC 4.8 4.8 - 10.8 K/uL    RBC 3.55 (L) 4.70 - 6.10 M/uL    Hemoglobin 6.8 (L) 14.0 - 18.0 g/dL    Hematocrit 25.9 (L) 42.0 - 52.0 %    MCV 73.0 (L) 81.4 - 97.8 fL    MCH 19.2 (L) 27.0 - 33.0 pg    MCHC 26.3 (L) 33.7 - 35.3 g/dL    RDW 64.2 (H) 35.9 - 50.0 fL    Platelet Count 64 (L) 164 - 446 K/uL    Neutrophils-Polys 51.60 44.00 - 72.00 %    Lymphocytes 26.40 22.00 - 41.00 %    Monocytes 12.80 0.00 - 13.40 %    Eosinophils 6.90 0.00 - 6.90 %    Basophils 1.50 0.00 - 1.80 %    Immature Granulocytes 0.80 0.00 - 0.90 %    Nucleated RBC 0.00 /100 WBC    Neutrophils (Absolute) 2.47 1.82 - 7.42 K/uL    Lymphs (Absolute) 1.26 1.00 - 4.80 K/uL    Monos (Absolute) 0.61 0.00 - 0.85 K/uL    Eos (Absolute) 0.33 0.00 - 0.51 K/uL    Baso (Absolute) 0.07 0.00 - 0.12 K/uL    Immature Granulocytes (abs) 0.04 0.00 - 0.11 K/uL    NRBC (Absolute) 0.00 K/uL   Basic Metabolic Panel    Collection Time: 03/27/21 10:25 AM   Result Value Ref Range    Sodium 140 135 - 145 mmol/L    Potassium 4.0 3.6 - 5.5 mmol/L    Chloride 110 96 - 112 mmol/L    Co2 22 20 - 33 mmol/L    Glucose 182 (H) 65 - 99 mg/dL    Bun 6 (L) 8 - 22 mg/dL    Creatinine 0.52 0.50 - 1.40 mg/dL    Calcium 7.5 (L) 8.5 - 10.5 mg/dL    Anion Gap 8.0 7.0 - 16.0   ESTIMATED GFR    Collection Time: 03/27/21 10:25 AM   Result Value Ref Range    GFR If African American >60 >60 mL/min/1.73 m 2    GFR If Non African American >60 >60 mL/min/1.73 m 2        Imaging  CT-ABDOMEN-PELVIS WITH   Final Result      1.  Findings consistent with hepatic cirrhosis and portal hypertension.   2.  Probable small liver cyst.   3.  Diffuse mesenteric edema, likely related to liver disease.   4.  Mild wall thickening of ascending colon which may be due to liver disease or colitis.   5.  Small bilateral pleural effusions and body wall edema.      DX-CHEST-PORTABLE (1 VIEW)   Final Result      Hypoinflation without other evidence for acute cardiopulmonary disease.      EC-ECHOCARDIOGRAM COMPLETE W/O CONT    (Results Pending)       Assessment/Plan  * Chronic alcohol abuse  Assessment & Plan  Monitor for alcohol withdrawal at high risk   No prior episodes  + Alcohol level on admission 147  CIWA protocol on floor  MVI and Thiamine      Atrial flutter by electrocardiogram (HCC)  Assessment & Plan  Telemetry monitoring  Aggressive replace electrolyte and volume status  Dr Mcmullen cardiology following  Schedule metoprolol oral   Monitor need for escalation of care and continuous infusion  Likely holiday heart from alcohol abuse  Follow up on echo  Thyroid studies normal    Anemia  Assessment & Plan  Hg 3/24 was stable at 7.3, 3/26 7.6  Serial monitor Q8 transfusion for Hg < 7  Monitor stool  Gi Consulted  IV ppi    Alcoholic cirrhosis of liver without ascites (HCC)- (present on admission)  Assessment & Plan  Recommend strict alcohol cessation  Avoid hepatic toxins  Consider serial ammonia    Hypomagnesemia  Assessment & Plan  Aggressive monitor and replace    Hypokalemia  Assessment & Plan  Aggressive replace and monitor    Acute alcoholic gastritis without hemorrhage  Assessment & Plan  IV protonix  GI evaluation   Serial monitor hg    Thrombocytopenia (HCC)  Assessment & Plan  Serial monitor  Likely related to alcohol abuse and bone marrow suppression    Coagulopathy (HCC)  Assessment & Plan  Serial monitor for bleeding  Consider TEG w/ mapping guide therapy with liver disease    Tobacco  abuse- (present on admission)  Assessment & Plan  Recommend cessation      Discussed patient condition and risk of morbidity and/or mortality with Hospitalist, RN, RT, Pharmacy, Charge nurse / hot rounds, Patient and cardiology.    The patient remains critically ill from afib/flutter getting IV metoprolol and monitoring.  Critical care time = 40 minutes in directly providing and coordinating critical care and extensive data review.  No time overlap and excludes procedures.    Patient is at high risk of needing ICU level care for worsening alcohol withdrawal please contact me/ICU Team if patient status changes.

## 2021-03-27 NOTE — CARE PLAN
Problem: Safety  Goal: Will remain free from injury  Outcome: PROGRESSING AS EXPECTED   Safety precautions in use including use of call bell for assistance, bed in lowest position, bed/chair alarm utilized and use of treaded socks. Objects in room moved to allow access to bathroom

## 2021-03-27 NOTE — H&P
"Hospital Medicine History & Physical Note    Date of Service  3/27/2021    Primary Care Physician  MARIAMA Frausto    Consultants    Code Status  Full Code    Chief Complaint  Chief Complaint   Patient presents with   • Altered Mental Status     Pt having difficulty maintaining arousal and following commands   • Jaundice     Pt sclera of eyes and skin yellow upon assesment   • Sent by MD     Pt notified by MD that his hemoglobin was \"low\" at [facility] in [place]       History of Presenting Illness  45 y.o. male with history of alcohol dependence and alcoholic cirrhosis who presented to urgent care with abdominal pain and confusion 3/26/2021 he is referred to [facility] for evaluation.  In the emergency department he is found to have persistent symptoms.  Biochemical work-up includes anemia, thrombocytopenia, coagulopathy with INR 1.55, hypokalemia, ammonia 50 and a CT abdomen pelvis concerning for mesenteric edema and colitis.  He is referred to the hospitalist for admission.  At bedside he tachycardic without fever or hypotension and vague and unable to give details of lifestyle modifications, diet or medication regiment.  He denies fever endorses chills and nausea with abdominal distention and tenderness.  He admits to running out of his medications though is unable to recall them.  He inconsistently admits to consumption of alcohol 6 months versus a month ago and endorses daily cannabis use.    Review of Systems  Review of Systems   Unable to perform ROS: Mental acuity       Past Medical History   has no past medical history on file.    Surgical History   has no past surgical history on file.     Family History  family history includes Diabetes in his father; Heart Attack in his maternal grandfather; Lung Disease in his mother; Other in his brother, brother, and mother.     Social History   reports that he has been smoking cigarettes. He has a 20.00 pack-year smoking history. He quit " smokeless tobacco use about 9 years ago.  His smokeless tobacco use included chew. He reports previous alcohol use of about 21.0 oz of alcohol per week. He reports current drug use. Drug: Marijuana.    Allergies  No Known Allergies    Medications  Prior to Admission Medications   Prescriptions Last Dose Informant Patient Reported? Taking?   Ascorbic Acid (VITAMIN C GUMMIE PO) 3/26/2021 at AM Patient Yes Yes   Sig: Take 1 tablet by mouth every day.   omeprazole (PRILOSEC) 20 MG delayed-release capsule 3/19/2021 at UNK Patient Yes Yes   Sig: Take 20 mg by mouth every day.   ondansetron (ZOFRAN ODT) 4 MG TABLET DISPERSIBLE Not Taking at Completed Patient No No   Sig: Take 1 Tab by mouth every 6 hours as needed.   Patient not taking: Reported on 3/27/2021   pantoprazole (PROTONIX) 40 MG Tablet Delayed Response Not Taking at Not Taking Patient No No   Sig: Take 1 Tab by mouth every day.   Patient not taking: Reported on 3/27/2021      Facility-Administered Medications: None       Physical Exam  Temp:  [36 °C (96.8 °F)] 36 °C (96.8 °F)  Pulse:  [126-141] 134  Resp:  [17-23] 20  BP: (136-161)/(86-99) 143/90  SpO2:  [97 %-99 %] 97 %    Physical Exam  Vitals and nursing note reviewed.   Constitutional:       General: He is in acute distress.      Appearance: Normal appearance. He is normal weight. He is ill-appearing and toxic-appearing.   HENT:      Head: Normocephalic and atraumatic.      Nose: Nose normal. No congestion or rhinorrhea.      Mouth/Throat:      Mouth: Mucous membranes are moist.      Pharynx: Oropharynx is clear.   Eyes:      Extraocular Movements: Extraocular movements intact.      Conjunctiva/sclera: Conjunctivae normal.      Pupils: Pupils are equal, round, and reactive to light.   Neck:      Vascular: No carotid bruit.   Cardiovascular:      Rate and Rhythm: Regular rhythm. Tachycardia present.      Pulses: Normal pulses.      Heart sounds: Normal heart sounds. No murmur. No gallop.    Pulmonary:       Effort: No respiratory distress.      Breath sounds: Normal breath sounds. No wheezing or rales.   Abdominal:      General: Bowel sounds are normal. There is distension.      Palpations: Abdomen is soft. There is no mass.      Tenderness: There is abdominal tenderness. There is no guarding or rebound.      Hernia: No hernia is present.   Musculoskeletal:         General: No tenderness or signs of injury.      Cervical back: Normal range of motion and neck supple. No muscular tenderness.   Lymphadenopathy:      Cervical: No cervical adenopathy.   Skin:     Capillary Refill: Capillary refill takes less than 2 seconds.      Coloration: Skin is not jaundiced or pale.      Findings: No bruising.   Neurological:      General: No focal deficit present.      Mental Status: He is alert. He is disoriented.      Cranial Nerves: No cranial nerve deficit.      Motor: No weakness.      Coordination: Coordination normal.   Psychiatric:         Mood and Affect: Mood normal.         Thought Content: Thought content normal.         Judgment: Judgment normal.      Comments: Psychomotor slowed         Laboratory:  Recent Labs     03/24/21  1345 03/26/21  2330   WBC 6.0 7.3   RBC 3.79* 3.95*   HEMOGLOBIN 7.3* 7.6*   HEMATOCRIT 27.0* 28.4*   MCV 71.2* 71.9*   MCH 19.3* 19.2*   MCHC 27.0* 26.8*   RDW 60.2* 62.0*   PLATELETCT  --  51*     Recent Labs     03/24/21  1345 03/26/21  2330   SODIUM 141 140   POTASSIUM 3.9 3.5*   CHLORIDE 109 107   CO2 22 24   GLUCOSE 123* 104*   BUN 9 6*   CREATININE 0.57 0.59   CALCIUM 8.3* 8.0*     Recent Labs     03/24/21  1345 03/26/21  2330   ALTSGPT 16 15   ASTSGOT 43 43   ALKPHOSPHAT 157* 178*   TBILIRUBIN 4.5* 3.7*   LIPASE  --  48   GLUCOSE 123* 104*     Recent Labs     03/26/21  2330   APTT 40.5*   INR 1.55*     No results for input(s): NTPROBNP in the last 72 hours.  Recent Labs     03/24/21  1345   TRIGLYCERIDE 93   HDL 54   LDL 55     No results for input(s): TROPONINT in the last 72  hours.    Imaging:  CT-ABDOMEN-PELVIS WITH   Final Result      1.  Findings consistent with hepatic cirrhosis and portal hypertension.   2.  Probable small liver cyst.   3.  Diffuse mesenteric edema, likely related to liver disease.   4.  Mild wall thickening of ascending colon which may be due to liver disease or colitis.   5.  Small bilateral pleural effusions and body wall edema.      DX-CHEST-PORTABLE (1 VIEW)   Final Result      Hypoinflation without other evidence for acute cardiopulmonary disease.            Assessment/Plan:  I anticipate this patient will require at least two midnights for appropriate medical management, necessitating inpatient admission.    Spontaneous bacterial peritonitis (HCC)  Assessment & Plan  IV fluid challenge as ascites not yet present   Rocephin, follow-up blood culture  Consider diagnostic paracentesis in a.m.    Alcoholic cirrhosis of liver without ascites (HCC)- (present on admission)  Assessment & Plan  Mesenteric edema present anticipate development of ascites    Acute alcoholic gastritis without hemorrhage  Assessment & Plan  Protonix, follow-up CBC    Hepatic encephalopathy (HCC)  Assessment & Plan  Lactulose, Rocephin  Follow-up urine drug screen    Thrombocytopenia (HCC)  Assessment & Plan  Suspect recurrence of alcoholism   hold DVT prophylaxis, follow-up CBC    Anemia  Assessment & Plan  Likely secondary to recent gastritis with hemorrhage.  Follow-up anemia labs    Coagulopathy (HCC)  Assessment & Plan  No active bleed, vitamin K ordered    Tobacco abuse- (present on admission)  Assessment & Plan  1 pack/day, patient unreceptive to nicotine cessation counseling, nicotine replacement ordered

## 2021-03-27 NOTE — PROGRESS NOTES
Received bedside report from Night RN. Awake and oriented x 4. Call bell in reach and bed locked in the lowest position. Continues on NS at 250 ml/hr, bag 2 of 2 infusing. Remains with tachycardia with an irregular rhythm. Patient to be transferred to Telemetry unit and started on CIWA protocol. Patient able to ambulate with standby assistance. Bed alarm on. /77   Pulse 74   Temp 36.7 °C (98.1 °F) (Temporal)   Resp 16   Ht 1.829 m (6')   Wt 117 kg (257 lb 11.5 oz)   SpO2 93%   BMI 34.95 kg/m²

## 2021-03-27 NOTE — ASSESSMENT & PLAN NOTE
Due to chronic alcohol use  With esophageal varicose, on PPI, not compliant.  AMAed in 6/2020 for GI bleeding, no egd was done.

## 2021-03-27 NOTE — ASSESSMENT & PLAN NOTE
Telemetry monitoring  Aggressive repletion of electrolytes and volume status  Cardiology following  Started on propranolol by cards  Follow up on echo -> reviewed  Thyroid studies normal  Trialed on digoxin, transitioned to cardizem  Dex gtt for RASS

## 2021-03-28 ENCOUNTER — APPOINTMENT (OUTPATIENT)
Dept: RADIOLOGY | Facility: MEDICAL CENTER | Age: 46
DRG: 894 | End: 2021-03-28
Attending: INTERNAL MEDICINE
Payer: COMMERCIAL

## 2021-03-28 LAB
ALBUMIN SERPL BCP-MCNC: 3.4 G/DL (ref 3.2–4.9)
ALBUMIN/GLOB SERPL: 1.1 G/DL
ALP SERPL-CCNC: 143 U/L (ref 30–99)
ALT SERPL-CCNC: 11 U/L (ref 2–50)
AMMONIA PLAS-SCNC: 59 UMOL/L (ref 11–45)
ANION GAP SERPL CALC-SCNC: 8 MMOL/L (ref 7–16)
ANISOCYTOSIS BLD QL SMEAR: ABNORMAL
AST SERPL-CCNC: 39 U/L (ref 12–45)
BASOPHILS # BLD AUTO: 1.5 % (ref 0–1.8)
BASOPHILS # BLD: 0.11 K/UL (ref 0–0.12)
BILIRUB SERPL-MCNC: 6.2 MG/DL (ref 0.1–1.5)
BUN SERPL-MCNC: 9 MG/DL (ref 8–22)
CA-I SERPL-SCNC: 1.1 MMOL/L (ref 1.1–1.3)
CALCIUM SERPL-MCNC: 7.6 MG/DL (ref 8.5–10.5)
CHLORIDE SERPL-SCNC: 108 MMOL/L (ref 96–112)
CO2 SERPL-SCNC: 23 MMOL/L (ref 20–33)
CREAT SERPL-MCNC: 0.63 MG/DL (ref 0.5–1.4)
EOSINOPHIL # BLD AUTO: 0.35 K/UL (ref 0–0.51)
EOSINOPHIL NFR BLD: 4.8 % (ref 0–6.9)
ERYTHROCYTE [DISTWIDTH] IN BLOOD BY AUTOMATED COUNT: 63.8 FL (ref 35.9–50)
GLOBULIN SER CALC-MCNC: 3.1 G/DL (ref 1.9–3.5)
GLUCOSE SERPL-MCNC: 117 MG/DL (ref 65–99)
HCT VFR BLD AUTO: 32.4 % (ref 42–52)
HGB BLD-MCNC: 8.6 G/DL (ref 14–18)
HYPOCHROMIA BLD QL SMEAR: ABNORMAL
IMM GRANULOCYTES # BLD AUTO: 0.03 K/UL (ref 0–0.11)
IMM GRANULOCYTES NFR BLD AUTO: 0.4 % (ref 0–0.9)
LYMPHOCYTES # BLD AUTO: 1.39 K/UL (ref 1–4.8)
LYMPHOCYTES NFR BLD: 18.9 % (ref 22–41)
MAGNESIUM SERPL-MCNC: 2.1 MG/DL (ref 1.5–2.5)
MCH RBC QN AUTO: 19.8 PG (ref 27–33)
MCHC RBC AUTO-ENTMCNC: 26.5 G/DL (ref 33.7–35.3)
MCV RBC AUTO: 74.5 FL (ref 81.4–97.8)
MICROCYTES BLD QL SMEAR: ABNORMAL
MONOCYTES # BLD AUTO: 0.96 K/UL (ref 0–0.85)
MONOCYTES NFR BLD AUTO: 13.1 % (ref 0–13.4)
NEUTROPHILS # BLD AUTO: 4.51 K/UL (ref 1.82–7.42)
NEUTROPHILS NFR BLD: 61.3 % (ref 44–72)
NRBC # BLD AUTO: 0 K/UL
NRBC BLD-RTO: 0 /100 WBC
PHOSPHATE SERPL-MCNC: 4.2 MG/DL (ref 2.5–4.5)
PLATELET # BLD AUTO: 51 K/UL (ref 164–446)
POTASSIUM SERPL-SCNC: 4.6 MMOL/L (ref 3.6–5.5)
PROT SERPL-MCNC: 6.5 G/DL (ref 6–8.2)
RBC # BLD AUTO: 4.35 M/UL (ref 4.7–6.1)
SODIUM SERPL-SCNC: 139 MMOL/L (ref 135–145)
WBC # BLD AUTO: 7.4 K/UL (ref 4.8–10.8)

## 2021-03-28 PROCEDURE — 94760 N-INVAS EAR/PLS OXIMETRY 1: CPT

## 2021-03-28 PROCEDURE — 99291 CRITICAL CARE FIRST HOUR: CPT | Performed by: INTERNAL MEDICINE

## 2021-03-28 PROCEDURE — 82330 ASSAY OF CALCIUM: CPT

## 2021-03-28 PROCEDURE — A9270 NON-COVERED ITEM OR SERVICE: HCPCS | Performed by: INTERNAL MEDICINE

## 2021-03-28 PROCEDURE — 700102 HCHG RX REV CODE 250 W/ 637 OVERRIDE(OP): Performed by: STUDENT IN AN ORGANIZED HEALTH CARE EDUCATION/TRAINING PROGRAM

## 2021-03-28 PROCEDURE — C9113 INJ PANTOPRAZOLE SODIUM, VIA: HCPCS | Performed by: INTERNAL MEDICINE

## 2021-03-28 PROCEDURE — 700111 HCHG RX REV CODE 636 W/ 250 OVERRIDE (IP): Performed by: NURSE PRACTITIONER

## 2021-03-28 PROCEDURE — 770022 HCHG ROOM/CARE - ICU (200)

## 2021-03-28 PROCEDURE — 84100 ASSAY OF PHOSPHORUS: CPT

## 2021-03-28 PROCEDURE — A9270 NON-COVERED ITEM OR SERVICE: HCPCS | Performed by: STUDENT IN AN ORGANIZED HEALTH CARE EDUCATION/TRAINING PROGRAM

## 2021-03-28 PROCEDURE — 700111 HCHG RX REV CODE 636 W/ 250 OVERRIDE (IP): Performed by: STUDENT IN AN ORGANIZED HEALTH CARE EDUCATION/TRAINING PROGRAM

## 2021-03-28 PROCEDURE — 99233 SBSQ HOSP IP/OBS HIGH 50: CPT | Performed by: INTERNAL MEDICINE

## 2021-03-28 PROCEDURE — 82140 ASSAY OF AMMONIA: CPT

## 2021-03-28 PROCEDURE — 700102 HCHG RX REV CODE 250 W/ 637 OVERRIDE(OP)

## 2021-03-28 PROCEDURE — 85025 COMPLETE CBC W/AUTO DIFF WBC: CPT

## 2021-03-28 PROCEDURE — A9270 NON-COVERED ITEM OR SERVICE: HCPCS

## 2021-03-28 PROCEDURE — 80053 COMPREHEN METABOLIC PANEL: CPT

## 2021-03-28 PROCEDURE — 83735 ASSAY OF MAGNESIUM: CPT

## 2021-03-28 PROCEDURE — 700111 HCHG RX REV CODE 636 W/ 250 OVERRIDE (IP): Performed by: INTERNAL MEDICINE

## 2021-03-28 PROCEDURE — 700105 HCHG RX REV CODE 258: Performed by: INTERNAL MEDICINE

## 2021-03-28 PROCEDURE — 700105 HCHG RX REV CODE 258: Performed by: NURSE PRACTITIONER

## 2021-03-28 PROCEDURE — 700102 HCHG RX REV CODE 250 W/ 637 OVERRIDE(OP): Performed by: INTERNAL MEDICINE

## 2021-03-28 RX ORDER — POTASSIUM CHLORIDE 20 MEQ/1
40 TABLET, EXTENDED RELEASE ORAL DAILY
Status: DISCONTINUED | OUTPATIENT
Start: 2021-03-29 | End: 2021-03-29

## 2021-03-28 RX ORDER — FOLIC ACID 1 MG/1
TABLET ORAL
Status: COMPLETED
Start: 2021-03-28 | End: 2021-03-28

## 2021-03-28 RX ORDER — HALOPERIDOL 5 MG/ML
5 INJECTION INTRAMUSCULAR EVERY 4 HOURS PRN
Status: DISCONTINUED | OUTPATIENT
Start: 2021-03-28 | End: 2021-03-30 | Stop reason: HOSPADM

## 2021-03-28 RX ORDER — CLONIDINE HYDROCHLORIDE 0.1 MG/1
0.1 TABLET ORAL
Status: DISCONTINUED | OUTPATIENT
Start: 2021-03-28 | End: 2021-03-29

## 2021-03-28 RX ORDER — LORAZEPAM 2 MG/ML
3 INJECTION INTRAMUSCULAR ONCE
Status: COMPLETED | OUTPATIENT
Start: 2021-03-28 | End: 2021-03-28

## 2021-03-28 RX ORDER — GAUZE BANDAGE 2" X 2"
BANDAGE TOPICAL
Status: COMPLETED
Start: 2021-03-28 | End: 2021-03-28

## 2021-03-28 RX ORDER — DIGOXIN 0.25 MG/ML
250 INJECTION INTRAMUSCULAR; INTRAVENOUS ONCE
Status: COMPLETED | OUTPATIENT
Start: 2021-03-28 | End: 2021-03-28

## 2021-03-28 RX ORDER — DIGOXIN 125 MCG
250 TABLET ORAL DAILY
Status: DISCONTINUED | OUTPATIENT
Start: 2021-03-28 | End: 2021-03-28

## 2021-03-28 RX ORDER — LACTULOSE 20 G/30ML
30 SOLUTION ORAL 3 TIMES DAILY
Status: DISCONTINUED | OUTPATIENT
Start: 2021-03-28 | End: 2021-03-29

## 2021-03-28 RX ORDER — LORAZEPAM 2 MG/ML
1 INJECTION INTRAMUSCULAR EVERY 6 HOURS
Status: DISCONTINUED | OUTPATIENT
Start: 2021-03-28 | End: 2021-03-29

## 2021-03-28 RX ORDER — FOLIC ACID 1 MG/1
1 TABLET ORAL DAILY
Status: DISCONTINUED | OUTPATIENT
Start: 2021-03-28 | End: 2021-03-29

## 2021-03-28 RX ORDER — LORAZEPAM 2 MG/ML
1-2 INJECTION INTRAMUSCULAR
Status: DISCONTINUED | OUTPATIENT
Start: 2021-03-28 | End: 2021-03-30 | Stop reason: HOSPADM

## 2021-03-28 RX ORDER — GAUZE BANDAGE 2" X 2"
100 BANDAGE TOPICAL DAILY
Status: DISCONTINUED | OUTPATIENT
Start: 2021-03-28 | End: 2021-03-29

## 2021-03-28 RX ORDER — DEXMEDETOMIDINE HYDROCHLORIDE 4 UG/ML
.1-1 INJECTION, SOLUTION INTRAVENOUS CONTINUOUS
Status: DISCONTINUED | OUTPATIENT
Start: 2021-03-28 | End: 2021-03-30

## 2021-03-28 RX ORDER — LACTULOSE 20 G/30ML
30 SOLUTION ORAL 3 TIMES DAILY
Status: DISCONTINUED | OUTPATIENT
Start: 2021-03-28 | End: 2021-03-28

## 2021-03-28 RX ORDER — LACTULOSE 20 G/30ML
SOLUTION ORAL
Status: COMPLETED
Start: 2021-03-28 | End: 2021-03-28

## 2021-03-28 RX ADMIN — DIGOXIN 250 MCG: 0.25 INJECTION INTRAMUSCULAR; INTRAVENOUS at 10:38

## 2021-03-28 RX ADMIN — THERA TABS 1 TABLET: TAB at 17:10

## 2021-03-28 RX ADMIN — PANTOPRAZOLE SODIUM 40 MG: 40 INJECTION, POWDER, FOR SOLUTION INTRAVENOUS at 07:54

## 2021-03-28 RX ADMIN — LACTULOSE 30 ML: 20 SOLUTION ORAL at 17:11

## 2021-03-28 RX ADMIN — CHLORDIAZEPOXIDE HYDROCHLORIDE 50 MG: 25 CAPSULE ORAL at 00:00

## 2021-03-28 RX ADMIN — DILTIAZEM HYDROCHLORIDE 10 MG/HR: 5 INJECTION INTRAVENOUS at 12:39

## 2021-03-28 RX ADMIN — LORAZEPAM 1.5 MG: 2 INJECTION INTRAMUSCULAR; INTRAVENOUS at 00:06

## 2021-03-28 RX ADMIN — METOPROLOL TARTRATE 25 MG: 25 TABLET, FILM COATED ORAL at 00:00

## 2021-03-28 RX ADMIN — PANTOPRAZOLE SODIUM 40 MG: 40 INJECTION, POWDER, FOR SOLUTION INTRAVENOUS at 17:19

## 2021-03-28 RX ADMIN — LORAZEPAM 3 MG: 2 INJECTION INTRAMUSCULAR; INTRAVENOUS at 01:00

## 2021-03-28 RX ADMIN — Medication 100 MG: at 17:00

## 2021-03-28 RX ADMIN — RIFAXIMIN 550 MG: 550 TABLET ORAL at 17:10

## 2021-03-28 RX ADMIN — DILTIAZEM HYDROCHLORIDE 5 MG/HR: 5 INJECTION INTRAVENOUS at 10:38

## 2021-03-28 RX ADMIN — FOLIC ACID 1 MG: 1 TABLET ORAL at 17:10

## 2021-03-28 RX ADMIN — NICOTINE TRANSDERMAL SYSTEM 21 MG: 21 PATCH, EXTENDED RELEASE TRANSDERMAL at 06:00

## 2021-03-28 ASSESSMENT — LIFESTYLE VARIABLES
TREMOR: NO TREMOR
AUDITORY DISTURBANCES: NOT PRESENT
NAUSEA AND VOMITING: NO NAUSEA AND NO VOMITING
TOTAL SCORE: 7
ORIENTATION AND CLOUDING OF SENSORIUM: CANNOT DO SERIAL ADDITIONS OR IS UNCERTAIN ABOUT DATE
VISUAL DISTURBANCES: NOT PRESENT
PAROXYSMAL SWEATS: *
AUDITORY DISTURBANCES: NOT PRESENT
NAUSEA AND VOMITING: NO NAUSEA AND NO VOMITING
NAUSEA AND VOMITING: NO NAUSEA AND NO VOMITING
TREMOR: *
HEADACHE, FULLNESS IN HEAD: NOT PRESENT
ORIENTATION AND CLOUDING OF SENSORIUM: DATE DISORIENTATION BY NO MORE THAN TWO CALENDAR DAYS
AGITATION: NORMAL ACTIVITY
ANXIETY: NO ANXIETY (AT EASE)
AUDITORY DISTURBANCES: NOT PRESENT
AGITATION: NORMAL ACTIVITY
TOTAL SCORE: 8
TREMOR: *
VISUAL DISTURBANCES: NOT PRESENT
PAROXYSMAL SWEATS: *
PAROXYSMAL SWEATS: *
VISUAL DISTURBANCES: NOT PRESENT
AGITATION: NORMAL ACTIVITY
TOTAL SCORE: 6
ANXIETY: NO ANXIETY (AT EASE)
ORIENTATION AND CLOUDING OF SENSORIUM: CANNOT DO SERIAL ADDITIONS OR IS UNCERTAIN ABOUT DATE
ANXIETY: NO ANXIETY (AT EASE)

## 2021-03-28 ASSESSMENT — FIBROSIS 4 INDEX: FIB4 SCORE: 7.29

## 2021-03-28 NOTE — PROGRESS NOTES
Report received from Aga RN, assumed care of pt. Plan of care discussed with pt, labs and chart reviewed. All needs met at this time. Tele box on. Call light within reach, bed locked and in lowest position. All fall precautions and hourly rounding in place. Will continue to monitor.

## 2021-03-28 NOTE — PROGRESS NOTES
Cardiology Follow Up Progress Note    Date of Service  3/28/2021    Attending Physician  Nikko Veloz M.D.    PMH: Alcoholic cirrhosis, upper GI bleed, on PPI, medical noncompliance, previously left AMA prior to EGD evaluation,      Presents with confusion altered mental status       cardiology consultation for atrial flutter RVR      Interim Events    Going through alcohol withdrawal  Received Librium  On lorazepam  Somnolent, unable to swallow pills  Remains in atrial flutter , rate 120s  Labs reviewed  NA, K, CR stable  Mag 1.7  TSH normal  /88      Review of Systems  Review of Systems   Unable to perform ROS: Acuity of condition       Vital signs in last 24 hours  Temp:  [36.1 °C (97 °F)-37.6 °C (99.6 °F)] 36.1 °C (97 °F)  Pulse:  [118-143] 125  Resp:  [16-20] 20  BP: (121-141)/(79-99) 132/99  SpO2:  [94 %-97 %] 96 %    Physical Exam  Physical Exam  Cardiovascular:      Rate and Rhythm: Tachycardia present. Rhythm irregular.      Pulses: Normal pulses.   Skin:     General: Skin is warm.   Neurological:      Comments: Alcohol withdrawal, somnolent         Lab Review  Lab Results   Component Value Date/Time    WBC 8.0 03/27/2021 09:11 PM    RBC 3.95 (L) 03/27/2021 09:11 PM    HEMOGLOBIN 7.7 (L) 03/27/2021 09:11 PM    HEMATOCRIT 28.6 (L) 03/27/2021 09:11 PM    MCV 72.4 (L) 03/27/2021 09:11 PM    MCH 19.5 (L) 03/27/2021 09:11 PM    MCHC 26.9 (L) 03/27/2021 09:11 PM    MPV 10.2 11/09/2020 09:15 AM      Lab Results   Component Value Date/Time    SODIUM 140 03/27/2021 10:25 AM    POTASSIUM 4.0 03/27/2021 10:25 AM    CHLORIDE 110 03/27/2021 10:25 AM    CO2 22 03/27/2021 10:25 AM    GLUCOSE 182 (H) 03/27/2021 10:25 AM    BUN 6 (L) 03/27/2021 10:25 AM    CREATININE 0.52 03/27/2021 10:25 AM      Lab Results   Component Value Date/Time    ASTSGOT 43 03/26/2021 11:30 PM    ALTSGPT 15 03/26/2021 11:30 PM     Lab Results   Component Value Date/Time    CHOLSTRLTOT 128 03/24/2021 01:45 PM    LDL 55 03/24/2021 01:45 PM     HDL 54 03/24/2021 01:45 PM    TRIGLYCERIDE 93 03/24/2021 01:45 PM       No results for input(s): NTPROBNP in the last 72 hours.    Cardiac Imaging and Procedures Review  EKG: Atrial flutter, rate of 140    Echocardiogram:    3/27/321  LVEF 45 to 50%  No valvular abnormalities    Cardiac Catheterization: Not applicable    Imaging  Chest X-Ray:  Hypoinflation without other evidence for acute cardiopulmonary disease.    Stress Test: Not applicable not applicable    Assessment/Plan    Atrial flutter with RVR, new diagnosis  -Remains in flutter, RVR  -Going through alcohol withdrawal  -Somnolent, unable to swallow pills  -Switch to IV digoxin  -Consider IV diltiazem  -Evaluatation for ablation once withdrawal symptoms are treated    Mild cardiomyopathy by echocardiogram, LVEF 45 to 50%  -Likely EtOH mediated, in addition to current RVR  -GDMT    EtOH dependency  -Alcohol withdrawal  -On Librium  -Lorazepam as needed  -Somnolent    Portal hypertension, cirrhosis  -On IV PPI    History of GI bleed/anemia  -Requiring blood transfusion x1  -H/H=7.7/28.6  -Likely needs EGD  -History of noncompliance  -History of leaving AMA prior to EGD evaluation    Thrombocytopenia  -Platelet count 67    We will follow along    Thank you for allowing me to participate in the care of this patient.    Please contact me with any questions.    SUE Sellers.   Cardiologist, [facility]  [phone]

## 2021-03-28 NOTE — PROGRESS NOTES
Pt to room 622 with rapid team.  Pt awakes to commands.  Aline (wife) at bedside.  Black cross necklace and wedding ring given to the wife.

## 2021-03-28 NOTE — PROGRESS NOTES
Pt becoming increasingly impulsive, exiting bed for no reason most recent CIWA increased from 3 to 6. Will continue to monitor closely.

## 2021-03-28 NOTE — PROGRESS NOTES
Came back by to round on patient from consult yesterday. Patient received 150 mg scheduled librium and total of 6 mg of ativan. He now is with snoring respiration. He still is in atrial flutter but now it NPO. Called spoke with Dr El and asked if I could transfer patient to ICU. Spoke with wife at bedside she describes he very sensitive to medication. Likely poorly metabolized with his advance liver disease. No am labs yet.     I have asked for stat cmp, cbc, mag, ammonia    Patient will be better optimized with RASS base calculation as CIWA will incorporate his HR and will get inappropriate ativan.      Thai Reed MD  Critical Care Medicine

## 2021-03-28 NOTE — PROGRESS NOTES
Pt CIWA now 23. Pt anxiety levels increasing as is the repeated attempts to get out of bed. Pt now pulling out IVs and removing monitoring equipment. Doctor consulted. Stat Ativan 3mg iv ordered. Will continue to monitor.

## 2021-03-28 NOTE — PROGRESS NOTES
Cortrak Placement    Tube Team verified patient name and medical record number prior to tube placement.  Cortrak tube (43 inches, 10 Citizen of [place]) placed at 87 cm in left nare.  Per Cortrak picture, tube appears to be in the stomach.  Nursing Instructions: Awaiting KUB to confirm placement before use for medications or feeding. Once placement confirmed, flush tube with 30 ml of water, and then remove and save stylet, in patient medication drawer.

## 2021-03-28 NOTE — PROGRESS NOTES
Critical Care Progress Note    Date of admission  3/26/2021    Chief Complaint  45 y.o. male who presented 3/26/2021 with Hx of alcohol cirrhosis, previous GI bleed left prior to EGD in past. He presented to urgent care for confusion. He was found to have hypokalemia, anemia thrombocytopenia, INR of 1.55 ct abdomen with mesenteric edema. He was admitted to hospital. I was called this morning for ICU consultation for aflutter and also concerns of alcohol withdrawal. He was mentation normally and provided full history. He denied chest pain, prior palpitation or alcohol withdrawal. No black or bloody stool. He ran out of his prescription for PPI and got over the counter PPI. He was being seen by Dr Mcmullen for his aflutter at the time was given metoprolol 5mg IV with rates in the 100-130's. His electrolytes were not replaced and am labs were still pending. Discussed with Dr Barrett and Dr Mcmullen and rapid response team at bedside.     3/28 Came back by to round on patient from consult yesterday. Patient received 150 mg scheduled librium and total of 6 mg of ativan. He now is with snoring respiration does awake with stimulation and voice. He still is in atrial flutter but now it NPO do to his mental status. Called spoke with Dr El and asked if I could transfer patient to ICU. Spoke with wife at bedside she describes he very sensitive to medication. Likely poorly metabolized with his advance liver disease. No am labs yet stat labs ordered. Patient will be better optimized with RASS base calculation as CIWA will incorporate his HR and will get inappropriate ativan. Atrial flutter will better with dex gtt.     Hospital Course  3/27 consult of floor with trial of therapy  3/28 transferred to ICU    Interval Problem Update  Reviewed last 24 hour events:  Discussed with wife, charge, pharmacy  Dilt gtt for HR < 100  Dex gtt   Ativan low dose scheduled  Stop librium  Monitor airway  Follow up labs    Review of Systems  Review of  Systems   Unable to perform ROS: Mental status change        Vital Signs for last 24 hours   Temp:  [35.8 °C (96.5 °F)-37.6 °C (99.6 °F)] 35.8 °C (96.5 °F)  Pulse:  [118-133] 133  Resp:  [17-22] 22  BP: (121-141)/(79-99) 126/88  SpO2:  [94 %-100 %] 100 %    Hemodynamic parameters for last 24 hours       Respiratory Information for the last 24 hours       Physical Exam   Physical Exam  Vitals and nursing note reviewed.   Constitutional:       General: He is not in acute distress.     Appearance: He is obese. He is not ill-appearing.      Comments: Snoring sleeping in bed   HENT:      Head: Normocephalic.      Right Ear: There is no impacted cerumen.      Mouth/Throat:      Mouth: Mucous membranes are dry.      Pharynx: No oropharyngeal exudate or posterior oropharyngeal erythema.   Eyes:      Pupils: Pupils are equal, round, and reactive to light.   Cardiovascular:      Rate and Rhythm: Tachycardia present.      Heart sounds: No murmur.   Pulmonary:      Effort: No respiratory distress.      Breath sounds: No stridor. No wheezing or rhonchi.      Comments: Snoring breathing  Abdominal:      General: There is no distension.      Palpations: There is no mass.      Tenderness: There is no abdominal tenderness. There is no guarding or rebound.      Hernia: No hernia is present.   Musculoskeletal:         General: No swelling, tenderness, deformity or signs of injury.   Skin:     Coloration: Skin is not jaundiced or pale.   Neurological:      Comments: Patient awakes with stimulation but inattentive, moves all extremities, sedated from ativan   Psychiatric:      Comments: Unable to determine         Medications  Current Facility-Administered Medications   Medication Dose Route Frequency Provider Last Rate Last Admin   • haloperidol lactate (HALDOL) injection 5 mg  5 mg Intravenous Q4HRS PRN Tiesha Campoverde M.D.       • dexmedetomidine (PRECEDEX) 400 mcg/100mL NS premix infusion  0.1-1 mcg/kg/hr Intravenous Continuous  Thai Reed M.D.       • LORazepam (ATIVAN) injection 1 mg  1 mg Intravenous Q6HRS Thai Reed M.D.       • LORazepam (ATIVAN) injection 1-2 mg  1-2 mg Intravenous Q2HRS PRN Thai Reed M.D.       • DILTIAZem (CARDIZEM) 100 mg in dextrose 5% 100 mL Infusion  0-15 mg/hr Intravenous Continuous Thai Reed M.D.       • senna-docusate (PERICOLACE or SENOKOT S) 8.6-50 MG per tablet 2 tablet  2 tablet Oral BID Alfonso Contreras M.D.   Stopped at 03/27/21 1800    And   • polyethylene glycol/lytes (MIRALAX) PACKET 1 Packet  1 Packet Oral QDAY PRN Alfonso Contreras M.D.        And   • magnesium hydroxide (MILK OF MAGNESIA) suspension 30 mL  30 mL Oral QDAY PRN Alfonso Contreras M.D.        And   • bisacodyl (DULCOLAX) suppository 10 mg  10 mg Rectal QDAY PRN Alfonso Contreras M.D.       • pantoprazole (PROTONIX) injection 40 mg  40 mg Intravenous BID Alfonso Contreras M.D.   40 mg at 03/28/21 0754   • nicotine (NICODERM) 21 MG/24HR 21 mg  21 mg Transdermal Daily-0600 Alfonso Contreras M.D.   21 mg at 03/28/21 0600    And   • nicotine polacrilex (NICORETTE) 2 MG piece 2 mg  2 mg Oral Q HOUR PRN Alfonso Contreras M.D.       • potassium chloride SA (Kdur) tablet 40 mEq  40 mEq Oral DAILY Maik Barrett M.D.   Stopped at 03/28/21 0600   • thiamine (Vitamin B-1) tablet 100 mg  100 mg Oral DAILY Maik Barrett M.D.   Stopped at 03/28/21 0600    And   • multivitamin (THERAGRAN) tablet 1 tablet  1 tablet Oral DAILY Maik Barrett M.D.   Stopped at 03/28/21 0600    And   • folic acid (FOLVITE) tablet 1 mg  1 mg Oral DAILY Maik Barrett M.D.   Stopped at 03/28/21 0600   • cloNIDine (CATAPRES) tablet 0.1 mg  0.1 mg Oral Q HOUR PRN Yuehua Li, M.D.       • [Held by provider] metoprolol tartrate (LOPRESSOR) tablet 25 mg  25 mg Oral Q6HRS Silvino Mcmullen M.D.   Stopped at 03/28/21 0600   • Metoprolol Tartrate (LOPRESSOR) injection 5 mg  5 mg Intravenous Q5 MIN PRDONTE Barrett M.D.           Fluids    Intake/Output Summary (Last 24 hours) at  3/28/2021 1151  Last data filed at 3/28/2021 0600  Gross per 24 hour   Intake --   Output 200 ml   Net -200 ml       Laboratory          Recent Labs     03/26/21 2330 03/27/21 0336 03/27/21  1025   SODIUM 140  --  140   POTASSIUM 3.5*  --  4.0   CHLORIDE 107  --  110   CO2 24  --  22   BUN 6*  --  6*   CREATININE 0.59  --  0.52   MAGNESIUM  --  1.7  --    PHOSPHORUS  --  4.4  --    CALCIUM 8.0*  --  7.5*     Recent Labs     03/26/21 2330 03/27/21  1025   ALTSGPT 15  --    ASTSGOT 43  --    ALKPHOSPHAT 178*  --    TBILIRUBIN 3.7*  --    LIPASE 48  --    GLUCOSE 104* 182*     Recent Labs     03/26/21 2330 03/26/21 2330 03/27/21 1025 03/27/21 1744 03/27/21 2111   WBC 7.3   < > 4.8 6.0 8.0   NEUTSPOLYS 58.60  --  51.60  --   --    LYMPHOCYTES 25.20  --  26.40  --   --    MONOCYTES 8.10  --  12.80  --   --    EOSINOPHILS 4.50  --  6.90  --   --    BASOPHILS 3.60*  --  1.50  --   --    ASTSGOT 43  --   --   --   --    ALTSGPT 15  --   --   --   --    ALKPHOSPHAT 178*  --   --   --   --    TBILIRUBIN 3.7*  --   --   --   --     < > = values in this interval not displayed.     Recent Labs     03/26/21 2330 03/26/21 2330 03/27/21 0336 03/27/21 1025 03/27/21 1744 03/27/21 2111   RBC 3.95*   < >  --  3.55* 3.96* 3.95*   HEMOGLOBIN 7.6*   < >  --  6.8* 8.0* 7.7*   HEMATOCRIT 28.4*   < >  --  25.9* 28.6* 28.6*   PLATELETCT 51*   < >  --  64* 65* 67*   PROTHROMBTM 19.1*  --   --   --   --   --    APTT 40.5*  --   --   --   --   --    INR 1.55*  --   --   --   --   --    IRON  --   --  16*  --   --   --    FERRITIN 10.7*  --   --   --   --   --    TOTIRONBC  --   --  402  --   --   --     < > = values in this interval not displayed.       Imaging  X-Ray:  I have personally reviewed the images and compared with prior images.  CT:    Reviewed  Echo:   Reviewed    Assessment/Plan  * Chronic alcohol abuse  Assessment & Plan  Monitor for alcohol withdrawal at high risk   No prior episodes  + Alcohol level on admission  147  CIWA protocol on floor  MVI and Thiamine      Atrial flutter with rapid ventricular response (HCC)  Assessment & Plan  Telemetry monitoring  Aggressive replace electrolyte and volume status  Cardiology following  Schedule metoprolol oral -> on hold with NPO  Likely holiday heart from alcohol abuse  Follow up on echo -> reviewed  Thyroid studies normal    Given digoxin 500mcg IV 3/27, 250mcg today 3/28    Transfer to ICU  Dex gtt for Rass  diltizam gtt for atrial flutter    Anemia  Assessment & Plan  Hg 3/24 was stable at 7.3, 3/26 7.6  Serial monitor Q8 transfusion for Hg < 7  Monitor stool  Gi Consulted  IV ppi  S/p 1 unit PRBC 3/27 post 2l of fluids    Alcoholic cirrhosis of liver without ascites (HCC)- (present on admission)  Assessment & Plan  Recommend strict alcohol cessation  Avoid hepatic toxins  Consider serial ammonia -> check this am 3/28  Place cortrac and start lactulose    Sleep apnea  Assessment & Plan  Hx of worsened with ativan  Monitor need for airway protection   Carefully monitor with dex gtt    Hypomagnesemia  Assessment & Plan  Aggressive monitor and replace    Hypokalemia  Assessment & Plan  Aggressive replace and monitor    Acute alcoholic gastritis without hemorrhage  Assessment & Plan  IV protonix  GI evaluation   Serial monitor hg    Hepatic encephalopathy (HCC)  Assessment & Plan  rule out causes: gi bleed, dehydration, infection, sedation medications, hypoxic/hypercarbic, constipation  Limit sedative  Monitor need for intubation and cerebral edema  Aspiration precautions    Lactulose/rifaximine titrate to +3 stools or 1500ml volume per day  Consider: Zinc, Golytely    Likely related to ativan with advance liver disease    Thrombocytopenia (HCC)  Assessment & Plan  Serial monitor  Likely related to alcohol abuse and bone marrow suppression    Coagulopathy (HCC)  Assessment & Plan  Serial monitor for bleeding  Consider TEG w/ mapping guide therapy with liver disease    Tobacco abuse-  (present on admission)  Assessment & Plan  Recommend cessation       VTE:  Contraindicated  Ulcer: PPI  Lines: Simon Catheter  Ongoing indication addressed    I have performed a physical exam and reviewed and updated ROS and Plan today (3/28/2021). In review of yesterday's note (3/27/2021), there are no changes except as documented above.     Discussed patient condition and risk of morbidity and/or mortality with Hospitalist, RN, RT, Pharmacy, Charge nurse / hot rounds, Patient and cardiology    The patient remains critically ill from alcohol abuse, liver disease, atrial flutter needing dilt gtt, dex gtt and monitor need for intubation.  Critical care time = 45 minutes in directly providing and coordinating critical care and extensive data review.  No time overlap and excludes procedures.

## 2021-03-28 NOTE — ASSESSMENT & PLAN NOTE
Limit sedatives  Monitor need for intubation and cerebral edema  Aspiration precautions  Lactulose/rifaximine titrate to +3 stools or 1500ml volume per day

## 2021-03-29 LAB
ALBUMIN SERPL BCP-MCNC: 3 G/DL (ref 3.2–4.9)
ALBUMIN/GLOB SERPL: 1 G/DL
ALP SERPL-CCNC: 128 U/L (ref 30–99)
ALT SERPL-CCNC: 11 U/L (ref 2–50)
AMMONIA PLAS-SCNC: 59 UMOL/L (ref 11–45)
ANION GAP SERPL CALC-SCNC: 9 MMOL/L (ref 7–16)
ANISOCYTOSIS BLD QL SMEAR: ABNORMAL
AST SERPL-CCNC: 32 U/L (ref 12–45)
BASOPHILS # BLD AUTO: 1.5 % (ref 0–1.8)
BASOPHILS # BLD: 0.11 K/UL (ref 0–0.12)
BILIRUB SERPL-MCNC: 5.1 MG/DL (ref 0.1–1.5)
BUN SERPL-MCNC: 8 MG/DL (ref 8–22)
CALCIUM SERPL-MCNC: 7.8 MG/DL (ref 8.5–10.5)
CHLORIDE SERPL-SCNC: 101 MMOL/L (ref 96–112)
CO2 SERPL-SCNC: 25 MMOL/L (ref 20–33)
COMMENT 1642: NORMAL
CREAT SERPL-MCNC: 0.62 MG/DL (ref 0.5–1.4)
EOSINOPHIL # BLD AUTO: 0.45 K/UL (ref 0–0.51)
EOSINOPHIL NFR BLD: 6.3 % (ref 0–6.9)
ERYTHROCYTE [DISTWIDTH] IN BLOOD BY AUTOMATED COUNT: 62.6 FL (ref 35.9–50)
GLOBULIN SER CALC-MCNC: 3 G/DL (ref 1.9–3.5)
GLUCOSE SERPL-MCNC: 117 MG/DL (ref 65–99)
HCT VFR BLD AUTO: 29.5 % (ref 42–52)
HGB BLD-MCNC: 7.9 G/DL (ref 14–18)
HYPOCHROMIA BLD QL SMEAR: ABNORMAL
IMM GRANULOCYTES # BLD AUTO: 0.04 K/UL (ref 0–0.11)
IMM GRANULOCYTES NFR BLD AUTO: 0.6 % (ref 0–0.9)
INR PPP: 1.57 (ref 0.87–1.13)
LYMPHOCYTES # BLD AUTO: 1.38 K/UL (ref 1–4.8)
LYMPHOCYTES NFR BLD: 19.4 % (ref 22–41)
MACROCYTES BLD QL SMEAR: ABNORMAL
MAGNESIUM SERPL-MCNC: 1.8 MG/DL (ref 1.5–2.5)
MCH RBC QN AUTO: 20.1 PG (ref 27–33)
MCHC RBC AUTO-ENTMCNC: 26.8 G/DL (ref 33.7–35.3)
MCV RBC AUTO: 74.9 FL (ref 81.4–97.8)
MICROCYTES BLD QL SMEAR: ABNORMAL
MONOCYTES # BLD AUTO: 0.89 K/UL (ref 0–0.85)
MONOCYTES NFR BLD AUTO: 12.5 % (ref 0–13.4)
MORPHOLOGY BLD-IMP: NORMAL
NEUTROPHILS # BLD AUTO: 4.24 K/UL (ref 1.82–7.42)
NEUTROPHILS NFR BLD: 59.7 % (ref 44–72)
NRBC # BLD AUTO: 0 K/UL
NRBC BLD-RTO: 0 /100 WBC
PHOSPHATE SERPL-MCNC: 4 MG/DL (ref 2.5–4.5)
PLATELET # BLD AUTO: 58 K/UL (ref 164–446)
PLATELETS.RETICULATED NFR BLD AUTO: 13.9 K/UL (ref 0.6–13.1)
POLYCHROMASIA BLD QL SMEAR: NORMAL
POTASSIUM SERPL-SCNC: 3.9 MMOL/L (ref 3.6–5.5)
PROT SERPL-MCNC: 6 G/DL (ref 6–8.2)
PROTHROMBIN TIME: 19.2 SEC (ref 12–14.6)
RBC # BLD AUTO: 3.94 M/UL (ref 4.7–6.1)
RBC BLD AUTO: PRESENT
SODIUM SERPL-SCNC: 135 MMOL/L (ref 135–145)
TARGETS BLD QL SMEAR: NORMAL
WBC # BLD AUTO: 7.1 K/UL (ref 4.8–10.8)

## 2021-03-29 PROCEDURE — 82140 ASSAY OF AMMONIA: CPT

## 2021-03-29 PROCEDURE — 700102 HCHG RX REV CODE 250 W/ 637 OVERRIDE(OP): Performed by: INTERNAL MEDICINE

## 2021-03-29 PROCEDURE — 700111 HCHG RX REV CODE 636 W/ 250 OVERRIDE (IP): Performed by: INTERNAL MEDICINE

## 2021-03-29 PROCEDURE — A9270 NON-COVERED ITEM OR SERVICE: HCPCS | Performed by: INTERNAL MEDICINE

## 2021-03-29 PROCEDURE — 99233 SBSQ HOSP IP/OBS HIGH 50: CPT | Performed by: INTERNAL MEDICINE

## 2021-03-29 PROCEDURE — C9113 INJ PANTOPRAZOLE SODIUM, VIA: HCPCS | Performed by: INTERNAL MEDICINE

## 2021-03-29 PROCEDURE — 85610 PROTHROMBIN TIME: CPT

## 2021-03-29 PROCEDURE — 84100 ASSAY OF PHOSPHORUS: CPT

## 2021-03-29 PROCEDURE — 80053 COMPREHEN METABOLIC PANEL: CPT

## 2021-03-29 PROCEDURE — 83735 ASSAY OF MAGNESIUM: CPT

## 2021-03-29 PROCEDURE — 85055 RETICULATED PLATELET ASSAY: CPT

## 2021-03-29 PROCEDURE — 770022 HCHG ROOM/CARE - ICU (200)

## 2021-03-29 PROCEDURE — 99291 CRITICAL CARE FIRST HOUR: CPT | Performed by: INTERNAL MEDICINE

## 2021-03-29 PROCEDURE — 700105 HCHG RX REV CODE 258: Performed by: INTERNAL MEDICINE

## 2021-03-29 PROCEDURE — 85025 COMPLETE CBC W/AUTO DIFF WBC: CPT

## 2021-03-29 RX ORDER — CLONIDINE HYDROCHLORIDE 0.1 MG/1
0.1 TABLET ORAL
Status: DISCONTINUED | OUTPATIENT
Start: 2021-03-29 | End: 2021-03-30 | Stop reason: HOSPADM

## 2021-03-29 RX ORDER — LACTULOSE 20 G/30ML
30 SOLUTION ORAL 3 TIMES DAILY
Status: DISCONTINUED | OUTPATIENT
Start: 2021-03-29 | End: 2021-03-30 | Stop reason: HOSPADM

## 2021-03-29 RX ORDER — POTASSIUM CHLORIDE 20 MEQ/1
40 TABLET, EXTENDED RELEASE ORAL DAILY
Status: DISCONTINUED | OUTPATIENT
Start: 2021-03-30 | End: 2021-03-30 | Stop reason: HOSPADM

## 2021-03-29 RX ORDER — PROPRANOLOL HYDROCHLORIDE 20 MG/1
20 TABLET ORAL EVERY 8 HOURS
Status: DISCONTINUED | OUTPATIENT
Start: 2021-03-29 | End: 2021-03-30 | Stop reason: HOSPADM

## 2021-03-29 RX ORDER — PROPRANOLOL HYDROCHLORIDE 20 MG/1
20 TABLET ORAL EVERY 8 HOURS
Status: DISCONTINUED | OUTPATIENT
Start: 2021-03-29 | End: 2021-03-29

## 2021-03-29 RX ORDER — PROPRANOLOL HYDROCHLORIDE 20 MG/1
20 TABLET ORAL 3 TIMES DAILY
Status: DISCONTINUED | OUTPATIENT
Start: 2021-03-29 | End: 2021-03-29

## 2021-03-29 RX ORDER — FOLIC ACID 1 MG/1
1 TABLET ORAL DAILY
Status: DISCONTINUED | OUTPATIENT
Start: 2021-03-30 | End: 2021-03-30 | Stop reason: HOSPADM

## 2021-03-29 RX ORDER — MAGNESIUM SULFATE HEPTAHYDRATE 40 MG/ML
2 INJECTION, SOLUTION INTRAVENOUS ONCE
Status: COMPLETED | OUTPATIENT
Start: 2021-03-29 | End: 2021-03-29

## 2021-03-29 RX ORDER — GAUZE BANDAGE 2" X 2"
100 BANDAGE TOPICAL DAILY
Status: DISCONTINUED | OUTPATIENT
Start: 2021-03-30 | End: 2021-03-30 | Stop reason: HOSPADM

## 2021-03-29 RX ORDER — OMEPRAZOLE 20 MG/1
20 CAPSULE, DELAYED RELEASE ORAL 2 TIMES DAILY
Status: DISCONTINUED | OUTPATIENT
Start: 2021-03-29 | End: 2021-03-30 | Stop reason: HOSPADM

## 2021-03-29 RX ADMIN — PROPRANOLOL HYDROCHLORIDE 20 MG: 20 TABLET ORAL at 21:27

## 2021-03-29 RX ADMIN — PROPRANOLOL HYDROCHLORIDE 20 MG: 20 TABLET ORAL at 10:15

## 2021-03-29 RX ADMIN — LACTULOSE 30 ML: 20 SOLUTION ORAL at 05:28

## 2021-03-29 RX ADMIN — FOLIC ACID 1 MG: 1 TABLET ORAL at 05:30

## 2021-03-29 RX ADMIN — MAGNESIUM SULFATE 2 G: 2 INJECTION INTRAVENOUS at 08:33

## 2021-03-29 RX ADMIN — PANTOPRAZOLE SODIUM 40 MG: 40 INJECTION, POWDER, FOR SOLUTION INTRAVENOUS at 05:28

## 2021-03-29 RX ADMIN — NICOTINE TRANSDERMAL SYSTEM 21 MG: 21 PATCH, EXTENDED RELEASE TRANSDERMAL at 05:28

## 2021-03-29 RX ADMIN — DILTIAZEM HYDROCHLORIDE 10 MG/HR: 5 INJECTION INTRAVENOUS at 08:27

## 2021-03-29 RX ADMIN — RIFAXIMIN 550 MG: 550 TABLET ORAL at 18:20

## 2021-03-29 RX ADMIN — LACTULOSE 30 ML: 20 SOLUTION ORAL at 11:16

## 2021-03-29 RX ADMIN — OMEPRAZOLE 20 MG: 20 CAPSULE, DELAYED RELEASE ORAL at 18:20

## 2021-03-29 RX ADMIN — DILTIAZEM HYDROCHLORIDE 10 MG/HR: 5 INJECTION INTRAVENOUS at 19:14

## 2021-03-29 RX ADMIN — RIFAXIMIN 550 MG: 550 TABLET ORAL at 05:29

## 2021-03-29 RX ADMIN — Medication 100 MG: at 05:30

## 2021-03-29 RX ADMIN — LACTULOSE 30 ML: 20 SOLUTION ORAL at 18:20

## 2021-03-29 RX ADMIN — POTASSIUM CHLORIDE 40 MEQ: 1500 TABLET, EXTENDED RELEASE ORAL at 05:30

## 2021-03-29 RX ADMIN — THERA TABS 1 TABLET: TAB at 05:29

## 2021-03-29 ASSESSMENT — PAIN DESCRIPTION - PAIN TYPE
TYPE: ACUTE PAIN

## 2021-03-29 ASSESSMENT — ENCOUNTER SYMPTOMS
STRIDOR: 0
CHILLS: 0
ABDOMINAL PAIN: 0
BLURRED VISION: 0
DIZZINESS: 0
MYALGIAS: 0
VOMITING: 0
HEARTBURN: 1
SPUTUM PRODUCTION: 0
COUGH: 0
SENSORY CHANGE: 0
FOCAL WEAKNESS: 0
SHORTNESS OF BREATH: 0
FEVER: 0
NAUSEA: 0

## 2021-03-29 ASSESSMENT — FIBROSIS 4 INDEX: FIB4 SCORE: 7.32

## 2021-03-29 ASSESSMENT — LIFESTYLE VARIABLES: SUBSTANCE_ABUSE: 1

## 2021-03-29 NOTE — CARE PLAN
Problem: Respiratory:  Goal: Respiratory status will improve  Outcome: PROGRESSING AS EXPECTED     Problem: Communication  Goal: The ability to communicate needs accurately and effectively will improve  Outcome: PROGRESSING AS EXPECTED     Problem: Safety  Goal: Will remain free from injury  Outcome: PROGRESSING AS EXPECTED  Goal: Will remain free from falls  Outcome: PROGRESSING AS EXPECTED     Problem: Safety - Medical Restraint  Goal: Remains free of injury from restraints (Restraint for Interference with Medical Device)  Description: INTERVENTIONS:  1. Determine that other, less restrictive measures have been tried or would not be effective before applying the restraint  2. Evaluate the patient's condition at the time of restraint application  3. Inform patient/family regarding the reason for restraint  4. Q2H: Monitor safety, psychosocial status, comfort, nutrition and hydration  Outcome: PROGRESSING AS EXPECTED  Goal: Free from restraint(s) (Restraint for Interference with Medical Device)  Description: INTERVENTIONS:  1. ONCE/SHIFT or MINIMUM Q12H: Assess and document the continuing need for restraints  2. Q24H: Continued use of restraint requires LIP to perform face to face examination and written order  3. Identify and implement measures to help patient regain control  Outcome: PROGRESSING AS EXPECTED     Problem: Skin Integrity  Goal: Risk for impaired skin integrity will decrease  Outcome: PROGRESSING AS EXPECTED

## 2021-03-29 NOTE — PROGRESS NOTES
Patient desat to 40-50% O2 before coming back up to 96% on room air x3 times. RT made aware. Patient placed on oxygen per RT. See chart for details. Unable to put patient on nocturnal BiPAP due to lethargic status at this time.

## 2021-03-29 NOTE — DIETARY
"Nutrition Support Assessment:  Day 2 of admit.  Enrique Drake is a 46 y.o. male with admitting DX of AMS and atrial flutter.      Current problem list:  1. Anemia  2. Chronic alcohol abuse  3. Alcoholic cirrhosis w/o ascites  4. Hepatic encephalopathy   5. Acute gastritis  6. Tobacco abuse      Assessment:  Estimated Nutritional Needs based on:   Height: 182.9 cm (6' 0.01\")  Weight: 114 kg (252 lb 3.3 oz)  Weight to Use in Calculations: 114 kg (252 lb 3.3 oz) - via bed scale.   Ideal Body Weight: 80.7 kg (178 lb)  Body mass index is 34.2 kg/m²., BMI classification: Obese Class I.     Calculation/Equation: MSJ x 1.1 - 1.2 = 2282 - 2489 kcal.   Total Calories / day: 2200 - 2480 (Calories / k - 22)  Total Grams Protein / day: 137 - 172 (Grams Protein / k.2 - 1.5 actual body wt); 121 - 162 (Grams Protein / k.5 - 2.0 IBW)     Evaluation:   1. Pt presented with AMS and hx of cirrhosis.  Consult received for TF recommendations.   2. Small bowel Cortrak for enteral access.  3. No additional PMHx on file.   4. Current clinical picture and MD progress notes reviewed.  Remains lethargic.  5. Cardiology continues to follow.   6. No staged wounds noted per review of the EMR.  Pt noted w/ trace edema to BLE.   7. +Room air.   8. Labs: Glucose: 117, Total bilirubin: 5.1, Albumin: 3.0, Ammonia: 59.   9. Meds: Lactulose, Electrolyte replacement, Omeprazole, Kdur, Rifaximin, Thiamin, Theragran, Folvite.  Precedex currently held per MAR.  10. LBM: 3/28 - medium, brown, watery, loose; expected w/ Lactulose & Rifaximin.   11. Impact Peptide 1.5 is an appropriate formula to meet estimated protein needs.      Malnutrition Risk: At risk for malnutrition 2/2 advanced liver disease.      Recommendations/Plan:  1. Initiate Impact Peptide 1.5 @ 25 mL/hr and advance per protocol to goal rate of 65 mL/hr, providing 2340 kcal, 147 gm protein, 1201 mL of free water, and 218 gm CHO.   2. Fluids per MD. Note, will receive ~1.2L " from TF @ goal.  3. Continue to monitor wt.  4. Safest PO diet per SLP as appropriate.  5. RD continues to monitor labs daily.    RD following.

## 2021-03-29 NOTE — PROGRESS NOTES
"Critical Care Progress Note    Date of admission  3/26/2021    Chief Complaint  46 y.o. male admitted 3/26/2021 with alcohol withdrawal and atrial flutter    Hospital Course  \"45 y.o. male who presented 3/26/2021 with Hx of alcohol cirrhosis, previous GI bleed left prior to EGD in past. He presented to urgent care for confusion. He was found to have hypokalemia, anemia thrombocytopenia, INR of 1.55 ct abdomen with mesenteric edema. He was admitted to hospital. I was called this morning for ICU consultation for aflutter and also concerns of alcohol withdrawal. He was mentation normally and provided full history. He denied chest pain, prior palpitation or alcohol withdrawal. No black or bloody stool. He ran out of his prescription for PPI and got over the counter PPI. He was being seen by Dr Mcmullen for his aflutter at the time was given metoprolol 5mg IV with rates in the 100-130's. His electrolytes were not replaced and am labs were still pending. Discussed with Dr Barrett and Dr Mcmullen and rapid response team at bedside.\"     3/28 Came back by to round on patient from consult yesterday. Patient received 150 mg scheduled librium and total of 6 mg of ativan. He now is with snoring respiration does awake with stimulation and voice. He still is in atrial flutter but now it NPO do to his mental status. Called spoke with Dr El and asked if I could transfer patient to ICU. Spoke with wife at bedside she describes he very sensitive to medication. Likely poorly metabolized with his advance liver disease. No am labs yet stat labs ordered. Patient will be better optimized with RASS base calculation as CIWA will incorporate his HR and will get inappropriate ativan. Atrial flutter will better with dex gtt.      Interval Problem Update  Reviewed last 24 hour events:   - No acute events overnight, off precedex, scheduled ativan held overnight for sedation   - Neuro: RASS -1   - HR: HR 90s-130s on 8 of dilt   - SBP: 120s-140s   - GI: " tolerating diet   - UOP: 3.2L/24 hrs   - Simon: condom cath   - Tm: 36.4   - Lines: PIV   - PPx: GI PPI, DVT contraindicated   - 5L O2, didn't use CPAP due to restraints   - CXR (personally reviewed and compared to prior): no new   - Protonix -> omeprazole. GI ([facility]) to be consulted once stable to have EGD    Review of Systems  Review of Systems   Constitutional: Negative for chills and fever.   Eyes: Negative for blurred vision.   Respiratory: Negative for cough, sputum production, shortness of breath and stridor.    Cardiovascular: Negative for chest pain.   Gastrointestinal: Positive for heartburn. Negative for abdominal pain, nausea and vomiting.   Genitourinary: Negative for dysuria.   Musculoskeletal: Negative for myalgias.   Skin: Negative for rash.   Neurological: Negative for dizziness, sensory change and focal weakness.   Psychiatric/Behavioral: Positive for substance abuse.        Vital Signs for last 24 hours   Temp:  [35.8 °C (96.5 °F)-36.4 °C (97.5 °F)] 36.4 °C (97.5 °F)  Pulse:  [] 117  Resp:  [11-35] 29  BP: (118-161)/(65-90) 129/79  SpO2:  [94 %-100 %] 94 %    Hemodynamic parameters for last 24 hours       Respiratory Information for the last 24 hours       Physical Exam   Physical Exam  Vitals and nursing note reviewed.   Constitutional:       General: He is not in acute distress.     Appearance: He is well-developed. He is obese. He is ill-appearing.      Comments: Snoring sleeping in bed   HENT:      Head: Normocephalic and atraumatic.      Right Ear: External ear normal.      Left Ear: External ear normal.      Nose: Nose normal.      Mouth/Throat:      Mouth: Mucous membranes are moist.   Eyes:      Extraocular Movements: Extraocular movements intact.      Conjunctiva/sclera: Conjunctivae normal.   Neck:      Vascular: No JVD.      Trachea: No tracheal deviation.   Cardiovascular:      Rate and Rhythm: Tachycardia present. Rhythm irregularly irregular.      Pulses: Normal pulses.    Pulmonary:      Effort: Pulmonary effort is normal. No respiratory distress.      Breath sounds: Normal breath sounds. No wheezing, rhonchi or rales.   Abdominal:      General: There is no distension.      Palpations: Abdomen is soft.      Tenderness: There is no abdominal tenderness. There is no guarding or rebound.   Musculoskeletal:         General: No swelling or tenderness.      Cervical back: Normal range of motion and neck supple.      Right lower leg: No edema.      Left lower leg: No edema.   Skin:     General: Skin is warm and dry.      Capillary Refill: Capillary refill takes less than 2 seconds.   Neurological:      General: No focal deficit present.      Mental Status: He is alert and oriented to person, place, and time.      Cranial Nerves: No cranial nerve deficit.      Comments: Patient awakes with stimulation and interacts appropriately   Psychiatric:         Mood and Affect: Mood normal.         Behavior: Behavior normal.      Comments: Unable to determine         Medications  Current Facility-Administered Medications   Medication Dose Route Frequency Provider Last Rate Last Admin   • haloperidol lactate (HALDOL) injection 5 mg  5 mg Intravenous Q4HRS PRN Tiesha Campoverde M.D.       • dexmedetomidine (PRECEDEX) 400 mcg/100mL NS premix infusion  0.1-1 mcg/kg/hr Intravenous Continuous Thai Reed M.D.   Stopped at 03/28/21 1145   • LORazepam (ATIVAN) injection 1 mg  1 mg Intravenous Q6HRS Thai Reed M.D.   Stopped at 03/28/21 1800   • LORazepam (ATIVAN) injection 1-2 mg  1-2 mg Intravenous Q2HRS PRN Thai Reed M.D.       • DILTIAZem (CARDIZEM) 100 mg in dextrose 5% 100 mL Infusion  0-15 mg/hr Intravenous Continuous Thai Reed M.D. 5 mL/hr at 03/29/21 0730 5 mg/hr at 03/29/21 0730   • riFAXIMin (XIFAXAN) tablet 550 mg  550 mg Enteral Tube BID Thai Reed M.D.   550 mg at 03/29/21 0529   • Pharmacy Consult: Enteral tube insertion - review meds/change route/product  selection  1 Each Other PHARMACY TO DOSE Thai Reed M.D.       • thiamine (Vitamin B-1) tablet 100 mg  100 mg Enteral Tube DAILY Thai Reed M.D.   100 mg at 03/29/21 0530    And   • multivitamin (THERAGRAN) tablet 1 tablet  1 tablet Enteral Tube DAILY Thai Reed M.D.   1 tablet at 03/29/21 0529    And   • folic acid (FOLVITE) tablet 1 mg  1 mg Enteral Tube DAILY Thai Reed M.D.   1 mg at 03/29/21 0530   • lactulose 20 GM/30ML solution 30 mL  30 mL Enteral Tube TID Thai Reed M.D.   30 mL at 03/29/21 0528   • potassium chloride SA (Kdur) tablet 40 mEq  40 mEq Enteral Tube DAILY Thai Reed M.D.   40 mEq at 03/29/21 0530   • cloNIDine (CATAPRES) tablet 0.1 mg  0.1 mg Enteral Tube Q HOUR PRN Thai Reed M.D.       • pantoprazole (PROTONIX) injection 40 mg  40 mg Intravenous BID Alfonso Contreras M.D.   40 mg at 03/29/21 0528   • nicotine (NICODERM) 21 MG/24HR 21 mg  21 mg Transdermal Daily-0600 Alfonso Contreras M.D.   21 mg at 03/29/21 0528   • Metoprolol Tartrate (LOPRESSOR) injection 5 mg  5 mg Intravenous Q5 MIN PRN Maik Barrett M.D.           Fluids    Intake/Output Summary (Last 24 hours) at 3/29/2021 0815  Last data filed at 3/29/2021 0730  Gross per 24 hour   Intake 396 ml   Output 3200 ml   Net -2804 ml       Laboratory          Recent Labs     03/26/21  2330 03/27/21  0336 03/27/21  1025 03/28/21  0924 03/29/21  0310   SODIUM   < >  --  140 139 135   POTASSIUM   < >  --  4.0 4.6 3.9   CHLORIDE   < >  --  110 108 101   CO2   < >  --  22 23 25   BUN   < >  --  6* 9 8   CREATININE   < >  --  0.52 0.63 0.62   MAGNESIUM  --  1.7  --  2.1 1.8   PHOSPHORUS  --  4.4  --  4.2 4.0   CALCIUM   < >  --  7.5* 7.6* 7.8*    < > = values in this interval not displayed.     Recent Labs     03/26/21  2330 03/26/21  2330 03/27/21  1025 03/28/21  0924 03/29/21  0310   ALTSGPT 15  --   --  11 11   ASTSGOT 43  --   --  39 32   ALKPHOSPHAT 178*  --   --  143* 128*   TBILIRUBIN 3.7*  --   --   6.2* 5.1*   LIPASE 48  --   --   --   --    GLUCOSE 104*   < > 182* 117* 117*    < > = values in this interval not displayed.     Recent Labs     03/26/21 2330 03/26/21 2330 03/27/21 1025 03/27/21  1744 03/27/21 2111 03/28/21  0924 03/29/21  0310   WBC 7.3   < > 4.8   < > 8.0 7.4 7.1   NEUTSPOLYS 58.60   < > 51.60  --   --  61.30 59.70   LYMPHOCYTES 25.20   < > 26.40  --   --  18.90* 19.40*   MONOCYTES 8.10   < > 12.80  --   --  13.10 12.50   EOSINOPHILS 4.50   < > 6.90  --   --  4.80 6.30   BASOPHILS 3.60*   < > 1.50  --   --  1.50 1.50   ASTSGOT 43  --   --   --   --  39 32   ALTSGPT 15  --   --   --   --  11 11   ALKPHOSPHAT 178*  --   --   --   --  143* 128*   TBILIRUBIN 3.7*  --   --   --   --  6.2* 5.1*    < > = values in this interval not displayed.     Recent Labs     03/26/21 2330 03/27/21 0336 03/27/21 1025 03/27/21 2111 03/28/21  0924 03/29/21  0310   RBC 3.95*  --    < > 3.95* 4.35* 3.94*   HEMOGLOBIN 7.6*  --    < > 7.7* 8.6* 7.9*   HEMATOCRIT 28.4*  --    < > 28.6* 32.4* 29.5*   PLATELETCT 51*  --    < > 67* 51* 58*   PROTHROMBTM 19.1*  --   --   --   --  19.2*   APTT 40.5*  --   --   --   --   --    INR 1.55*  --   --   --   --  1.57*   IRON  --  16*  --   --   --   --    FERRITIN 10.7*  --   --   --   --   --    TOTIRONBC  --  402  --   --   --   --     < > = values in this interval not displayed.       Imaging  X-Ray:  I have personally reviewed the images and compared with prior images.  EKG:  I have personally reviewed the images and compared with prior images.    Assessment/Plan  * Chronic alcohol abuse- (present on admission)  Assessment & Plan  Monitor for alcohol withdrawal at high risk   No prior episodes  Alcohol level on admission: 147  MVI and Thiamine  PRN lorazepam and precedex. Scheduled lorazepam discontinued.    Atrial flutter with rapid ventricular response (HCC)- (present on admission)  Assessment & Plan  Telemetry monitoring  Aggressive repletion of electrolytes and volume  status  Cardiology following  Started on propranolol by cards  Follow up on echo -> reviewed  Thyroid studies normal  Trialed on digoxin, transitioned to cardizem  Dex gtt for RASS    Anemia- (present on admission)  Assessment & Plan  Hg 3/24 was stable at 7.3, 3/26 7.6  Monitor Hg, transfusion for Hg < 7  GI to be called when improved for EGD ([facility])  Continue PPI, will transition to PO today  S/p 1 unit PRBC 3/27 post 2L of fluids    Alcoholic cirrhosis of liver without ascites (HCC)- (present on admission)  Assessment & Plan  Recommend strict alcohol cessation  Avoid hepatotoxins  Ammonia 59    Sleep apnea  Assessment & Plan  Worsened with ativan  Monitor need for airway protection   Carefully monitor with dex gtt    Hypomagnesemia- (present on admission)  Assessment & Plan  Aggressively monitor and replace    Hypokalemia- (present on admission)  Assessment & Plan  Aggressively replace and monitor    Acute alcoholic gastritis without hemorrhage  Assessment & Plan  PPI  GI evaluation when stable  Serial monitor Hg    Hepatic encephalopathy (HCC)  Assessment & Plan  Limit sedatives  Monitor need for intubation and cerebral edema  Aspiration precautions  Lactulose/rifaximine titrate to +3 stools or 1500ml volume per day    Thrombocytopenia (HCC)- (present on admission)  Assessment & Plan  Serial monitor  Likely related to alcohol abuse and bone marrow suppression    Coagulopathy (HCC)- (present on admission)  Assessment & Plan  Serial monitor for bleeding  Consider TEG w/ platelet mapping if bleeding re-occurs    Tobacco abuse- (present on admission)  Assessment & Plan  Recommend cessation       VTE:  Contraindicated  Ulcer: PPI  Lines: None    I have performed a physical exam and reviewed and updated ROS and Plan today (3/29/2021). In review of yesterday's note (3/28/2021), there are no changes except as documented above.     Titrating cardizem for rate control, treating encephalopathy. This patient is critically  ill, at high risk for decompensation leading to worsening vital organ dysfunction and death without critical care interventions.    Discussed patient condition and risk of morbidity and/or mortality with Family, RN, RT, Code status disscussed, Charge nurse / hot rounds, Patient and cardiology     The patient remains critically ill.  Critical care time = 33 minutes in directly providing and coordinating critical care and extensive data review.  No time overlap and excludes procedures.

## 2021-03-29 NOTE — HOSPITAL COURSE
"\"45 y.o. male who presented 3/26/2021 with Hx of alcohol cirrhosis, previous GI bleed left prior to EGD in past. He presented to urgent care for confusion. He was found to have hypokalemia, anemia thrombocytopenia, INR of 1.55 ct abdomen with mesenteric edema. He was admitted to hospital. I was called this morning for ICU consultation for aflutter and also concerns of alcohol withdrawal. He was mentation normally and provided full history. He denied chest pain, prior palpitation or alcohol withdrawal. No black or bloody stool. He ran out of his prescription for PPI and got over the counter PPI. He was being seen by Dr Mcmullen for his aflutter at the time was given metoprolol 5mg IV with rates in the 100-130's. His electrolytes were not replaced and am labs were still pending. Discussed with Dr Barrett and Dr Mcmullen and rapid response team at bedside.\"     3/28 Came back by to round on patient from consult yesterday. Patient received 150 mg scheduled librium and total of 6 mg of ativan. He now is with snoring respiration does awake with stimulation and voice. He still is in atrial flutter but now it NPO do to his mental status. Called spoke with Dr El and asked if I could transfer patient to ICU. Spoke with wife at bedside she describes he very sensitive to medication. Likely poorly metabolized with his advance liver disease. No am labs yet stat labs ordered. Patient will be better optimized with RASS base calculation as CIWA will incorporate his HR and will get inappropriate ativan. Atrial flutter will better with dex gtt.    3/29 Off dex, still on Cardizem infusion for rate control  "

## 2021-03-29 NOTE — CARE PLAN
Problem: Safety - Medical Restraint  Goal: Remains free of injury from restraints (Restraint for Interference with Medical Device)  Description: INTERVENTIONS:  1. Determine that other, less restrictive measures have been tried or would not be effective before applying the restraint  2. Evaluate the patient's condition at the time of restraint application  3. Inform patient/family regarding the reason for restraint  4. Q2H: Monitor safety, psychosocial status, comfort, nutrition and hydration  Flowsheets (Taken 3/28/2021 6836)  Addressed this shift: Remains free of injury from restraints (restraint for interference with medical device):   Evaluate the patient's condition at the time of restraint application   Inform patient/family regarding the reason for restraint   Every 2 hours: Monitor safety, psychosocial status, comfort, nutrition and hydration

## 2021-03-29 NOTE — DISCHARGE PLANNING
Care Transition Team Discharge Planning    Anticipated Discharge Disposition: TBD    Action: Per AM rounds, pt has hx of GI bleed, ETOH, and liver cirrhosis. Pt was transferred to higher level of care on [facility]. Pt is on room air. Pt has tube feeds via cortrak. Pt's condom cath has output. Pt is in restraints,and pulls out tubes. Pt was seen by cardiology this AM.     Barriers to Discharge: TBD     Plan: [facility] will continue to follow, and assist w/ d/c planning.

## 2021-03-29 NOTE — PROGRESS NOTES
Reason for consultation /admission : AF RVR    Lethargic but has not received sedation lately per RN  Denies palpitations, chest pain or shortness of breath    Monitor remained in A. fib with ventricualr rate in the 110s to 120s this morning on 5 mg diltiazem continuous infusion    Review of systems;    Unable to perform due to pt's mental status    Temp:  [36 °C (96.8 °F)-36.4 °C (97.5 °F)] 36.4 °C (97.5 °F)  Pulse:  [] 122  Resp:  [11-35] 29  BP: (118-161)/(65-90) 129/79  SpO2:  [94 %-100 %] 94 %  GENERAL not in acute distress, not dyspnic at rest  Head atraumatic, normocephalic  Eyes EOMI  ENT neck supple, no JVD, no carotid bruits or thyromegaly  Lung good expansion, distant sound, no rales or wheezing  Heart irregularly irregular slightly tachycardic, no murmur    no gallop or rub  Abd distended, enlagred liver, no tenderness,   Ext trace edema  Skin no ecchymosis or petechiae  Musculoskeletal no deformity  Neuro lethargic, no focal deficit     Monitor reviewed by me as above, significant ventricular dysrhythmias.    Labs: Hb 7.9. plt 58,    Results for TONG STAPLETON (MRN [id number]) as of 3/29/2021 09:02   Ref. Range 3/29/2021 03:10   Sodium Latest Ref Range: 135 - 145 mmol/L 135   Potassium Latest Ref Range: 3.6 - 5.5 mmol/L 3.9   Chloride Latest Ref Range: 96 - 112 mmol/L 101   Co2 Latest Ref Range: 20 - 33 mmol/L 25   Anion Gap Latest Ref Range: 7.0 - 16.0  9.0   Glucose Latest Ref Range: 65 - 99 mg/dL 117 (H)   Bun Latest Ref Range: 8 - 22 mg/dL 8   Creatinine Latest Ref Range: 0.50 - 1.40 mg/dL 0.62   GFR If  Latest Ref Range: >60 mL/min/1.73 m 2 >60   GFR If Non  Latest Ref Range: >60 mL/min/1.73 m 2 >60   Calcium Latest Ref Range: 8.5 - 10.5 mg/dL 7.8 (L)   AST(SGOT) Latest Ref Range: 12 - 45 U/L 32   ALT(SGPT) Latest Ref Range: 2 - 50 U/L 11   Alkaline Phosphatase Latest Ref Range: 30 - 99 U/L 128 (H)   Total Bilirubin Latest Ref Range: 0.1 - 1.5 mg/dL 5.1 (H)    Albumin Latest Ref Range: 3.2 - 4.9 g/dL 3.0 (L)   Total Protein Latest Ref Range: 6.0 - 8.2 g/dL 6.0   Globulin Latest Ref Range: 1.9 - 3.5 g/dL 3.0   A-G Ratio Latest Units: g/dL 1.0   Phosphorus Latest Ref Range: 2.5 - 4.5 mg/dL 4.0   Magnesium Latest Ref Range: 1.5 - 2.5 mg/dL 1.8   Ammonia Latest Ref Range: 11 - 45 umol/L 59 (H)     ECHO 3/27/21  Normal left ventricular chamber size.  Mildly reduced left ventricular systolic function.  Left ventricular ejection fraction is visually estimated to be 45-50%.  Rhythm is atrial flutter, tachycardic.    Assessment and plans    1.  Atrial flutter/fibrillation with rapid ventricular rate  Ventricular response is decent still on low-dose diltiazem.  We will try oral beta-blocker and wean off diltiazem infusion if possible.  Mildly reduced EF but no other major risk for stroke.  High bleeding risk due to liver cirrhosis, with recent GI bleed and low platelet, he is probably not good candidate for long-term anticoagulation.    2.  LV systolic dysfunction could be from alcoholic abuse  With his comorbidity, not a good candidate for aggressive coronary intervention.  We will first treat medically year and repeat echocardiography in 2 to 3 months after abstaining from alcohol.    3.  Severe thrombocytopenia ?hypersplenism  Per primary    4. Anemia  Per primary    5. Altered mental status   Per primary    Will follow    Please note that this dictation was created using voice recognition software. I have worked with consultants from the vendor as well as technical experts from [facility] to optimize the interface. I have made every reasonable attempt to correct obvious errors, but I expect that there are errors of grammar and possibly content I did not discover before finalizing the note

## 2021-03-30 VITALS
OXYGEN SATURATION: 96 % | RESPIRATION RATE: 24 BRPM | BODY MASS INDEX: 34.16 KG/M2 | WEIGHT: 252.21 LBS | HEART RATE: 109 BPM | TEMPERATURE: 97.3 F | HEIGHT: 72 IN | SYSTOLIC BLOOD PRESSURE: 104 MMHG | DIASTOLIC BLOOD PRESSURE: 65 MMHG

## 2021-03-30 LAB
ALBUMIN SERPL BCP-MCNC: 2.8 G/DL (ref 3.2–4.9)
ALBUMIN/GLOB SERPL: 1.1 G/DL
ALP SERPL-CCNC: 122 U/L (ref 30–99)
ALT SERPL-CCNC: 8 U/L (ref 2–50)
ANION GAP SERPL CALC-SCNC: 8 MMOL/L (ref 7–16)
ANISOCYTOSIS BLD QL SMEAR: ABNORMAL
AST SERPL-CCNC: 29 U/L (ref 12–45)
BASOPHILS # BLD AUTO: 1.1 % (ref 0–1.8)
BASOPHILS # BLD: 0.1 K/UL (ref 0–0.12)
BILIRUB SERPL-MCNC: 3.6 MG/DL (ref 0.1–1.5)
BUN SERPL-MCNC: 11 MG/DL (ref 8–22)
CALCIUM SERPL-MCNC: 7.6 MG/DL (ref 8.5–10.5)
CHLORIDE SERPL-SCNC: 102 MMOL/L (ref 96–112)
CO2 SERPL-SCNC: 24 MMOL/L (ref 20–33)
COMMENT 1642: NORMAL
CREAT SERPL-MCNC: 0.63 MG/DL (ref 0.5–1.4)
EOSINOPHIL # BLD AUTO: 0.35 K/UL (ref 0–0.51)
EOSINOPHIL NFR BLD: 3.9 % (ref 0–6.9)
ERYTHROCYTE [DISTWIDTH] IN BLOOD BY AUTOMATED COUNT: 62.4 FL (ref 35.9–50)
GIANT PLATELETS BLD QL SMEAR: NORMAL
GLOBULIN SER CALC-MCNC: 2.6 G/DL (ref 1.9–3.5)
GLUCOSE SERPL-MCNC: 165 MG/DL (ref 65–99)
HCT VFR BLD AUTO: 26.4 % (ref 42–52)
HGB BLD-MCNC: 7.4 G/DL (ref 14–18)
HYPOCHROMIA BLD QL SMEAR: ABNORMAL
IMM GRANULOCYTES # BLD AUTO: 0.04 K/UL (ref 0–0.11)
IMM GRANULOCYTES NFR BLD AUTO: 0.4 % (ref 0–0.9)
LYMPHOCYTES # BLD AUTO: 2.17 K/UL (ref 1–4.8)
LYMPHOCYTES NFR BLD: 24.1 % (ref 22–41)
MACROCYTES BLD QL SMEAR: ABNORMAL
MAGNESIUM SERPL-MCNC: 1.6 MG/DL (ref 1.5–2.5)
MCH RBC QN AUTO: 20.4 PG (ref 27–33)
MCHC RBC AUTO-ENTMCNC: 28 G/DL (ref 33.7–35.3)
MCV RBC AUTO: 72.7 FL (ref 81.4–97.8)
MICROCYTES BLD QL SMEAR: ABNORMAL
MONOCYTES # BLD AUTO: 1.16 K/UL (ref 0–0.85)
MONOCYTES NFR BLD AUTO: 12.9 % (ref 0–13.4)
MORPHOLOGY BLD-IMP: NORMAL
NEUTROPHILS # BLD AUTO: 5.18 K/UL (ref 1.82–7.42)
NEUTROPHILS NFR BLD: 57.6 % (ref 44–72)
NRBC # BLD AUTO: 0.02 K/UL
NRBC BLD-RTO: 0.2 /100 WBC
OVALOCYTES BLD QL SMEAR: NORMAL
PHOSPHATE SERPL-MCNC: 3.5 MG/DL (ref 2.5–4.5)
PLATELET # BLD AUTO: 72 K/UL (ref 164–446)
PLATELET BLD QL SMEAR: NORMAL
POIKILOCYTOSIS BLD QL SMEAR: NORMAL
POLYCHROMASIA BLD QL SMEAR: NORMAL
POTASSIUM SERPL-SCNC: 3.8 MMOL/L (ref 3.6–5.5)
PROT SERPL-MCNC: 5.4 G/DL (ref 6–8.2)
RBC # BLD AUTO: 3.63 M/UL (ref 4.7–6.1)
RBC BLD AUTO: PRESENT
SODIUM SERPL-SCNC: 134 MMOL/L (ref 135–145)
TARGETS BLD QL SMEAR: NORMAL
WBC # BLD AUTO: 9 K/UL (ref 4.8–10.8)

## 2021-03-30 PROCEDURE — 85025 COMPLETE CBC W/AUTO DIFF WBC: CPT

## 2021-03-30 PROCEDURE — 99238 HOSP IP/OBS DSCHRG MGMT 30/<: CPT | Mod: GC | Performed by: INTERNAL MEDICINE

## 2021-03-30 PROCEDURE — 700111 HCHG RX REV CODE 636 W/ 250 OVERRIDE (IP): Performed by: INTERNAL MEDICINE

## 2021-03-30 PROCEDURE — 80053 COMPREHEN METABOLIC PANEL: CPT

## 2021-03-30 PROCEDURE — A9270 NON-COVERED ITEM OR SERVICE: HCPCS | Performed by: INTERNAL MEDICINE

## 2021-03-30 PROCEDURE — 83735 ASSAY OF MAGNESIUM: CPT

## 2021-03-30 PROCEDURE — 700102 HCHG RX REV CODE 250 W/ 637 OVERRIDE(OP): Performed by: INTERNAL MEDICINE

## 2021-03-30 PROCEDURE — 99232 SBSQ HOSP IP/OBS MODERATE 35: CPT | Performed by: INTERNAL MEDICINE

## 2021-03-30 PROCEDURE — 84100 ASSAY OF PHOSPHORUS: CPT

## 2021-03-30 RX ORDER — CHOLECALCIFEROL (VITAMIN D3) 125 MCG
5 CAPSULE ORAL NIGHTLY
Status: DISCONTINUED | OUTPATIENT
Start: 2021-03-30 | End: 2021-03-30 | Stop reason: HOSPADM

## 2021-03-30 RX ORDER — DIPHENHYDRAMINE HYDROCHLORIDE 50 MG/ML
25 INJECTION INTRAMUSCULAR; INTRAVENOUS NIGHTLY PRN
Status: DISCONTINUED | OUTPATIENT
Start: 2021-03-30 | End: 2021-03-30 | Stop reason: HOSPADM

## 2021-03-30 RX ADMIN — DIPHENHYDRAMINE HYDROCHLORIDE 25 MG: 50 INJECTION INTRAMUSCULAR; INTRAVENOUS at 00:21

## 2021-03-30 RX ADMIN — NICOTINE TRANSDERMAL SYSTEM 21 MG: 21 PATCH, EXTENDED RELEASE TRANSDERMAL at 05:14

## 2021-03-30 RX ADMIN — POTASSIUM CHLORIDE 40 MEQ: 1500 TABLET, EXTENDED RELEASE ORAL at 05:15

## 2021-03-30 RX ADMIN — Medication 100 MG: at 05:15

## 2021-03-30 RX ADMIN — PROPRANOLOL HYDROCHLORIDE 20 MG: 20 TABLET ORAL at 05:15

## 2021-03-30 RX ADMIN — THERA TABS 1 TABLET: TAB at 05:15

## 2021-03-30 RX ADMIN — OMEPRAZOLE 20 MG: 20 CAPSULE, DELAYED RELEASE ORAL at 05:14

## 2021-03-30 RX ADMIN — FOLIC ACID 1 MG: 1 TABLET ORAL at 05:15

## 2021-03-30 RX ADMIN — RIFAXIMIN 550 MG: 550 TABLET ORAL at 05:14

## 2021-03-30 NOTE — PROGRESS NOTES
Patient increasingly restless throughout morning stating he was leaving today. MD updated and bedside with patient. Patient signed AMA paperwork at 0850. IV's removed and patient left unit at 0900 with all belongings.

## 2021-03-30 NOTE — PROGRESS NOTES
Reason for consultation /admission : A flutter/AF    Rate controlled with oral propanolol  Wants to leave      Review of systems;    General: No fever, chills, no weakness  Heart: No palpitation, no PND or orthopnea, no claudication, no leg swelling  Lung: No productive cough, no hemoptysis  Abdomen: No blood in stool    Temp:  [36.2 °C (97.2 °F)-36.8 °C (98.2 °F)] 36.3 °C (97.3 °F)  Pulse:  [] 97  Resp:  [16-29] 22  BP: ()/(43-79) 114/73  SpO2:  [93 %-100 %] 97 %  GENERAL not in acute distress, not dyspnic at rest  Head atraumatic, normocephalic  Eyes EOMI  ENT neck supple, no JVD, no carotid bruits or thyromegaly  Lung good expansion, distant sound, no rales or wheezing  Heart irregularly irregular, normal rate, no murmur,   no gallop or rub  Ext no edema  Skin no ecchymosis or petechiae  Musculoskeletal no deformity  Neuro grossly intact    Labs; Results for PIETER TONG NIKOS (MRN [id number]) as of 3/30/2021 08:23   Ref. Range 3/30/2021 05:00   Sodium Latest Ref Range: 135 - 145 mmol/L 134 (L)   Potassium Latest Ref Range: 3.6 - 5.5 mmol/L 3.8   Chloride Latest Ref Range: 96 - 112 mmol/L 102   Co2 Latest Ref Range: 20 - 33 mmol/L 24   Anion Gap Latest Ref Range: 7.0 - 16.0  8.0   Glucose Latest Ref Range: 65 - 99 mg/dL 165 (H)   Bun Latest Ref Range: 8 - 22 mg/dL 11   Creatinine Latest Ref Range: 0.50 - 1.40 mg/dL 0.63   GFR If  Latest Ref Range: >60 mL/min/1.73 m 2 >60   GFR If Non  Latest Ref Range: >60 mL/min/1.73 m 2 >60   Calcium Latest Ref Range: 8.5 - 10.5 mg/dL 7.6 (L)   AST(SGOT) Latest Ref Range: 12 - 45 U/L 29   ALT(SGPT) Latest Ref Range: 2 - 50 U/L 8   Alkaline Phosphatase Latest Ref Range: 30 - 99 U/L 122 (H)   Total Bilirubin Latest Ref Range: 0.1 - 1.5 mg/dL 3.6 (H)   Albumin Latest Ref Range: 3.2 - 4.9 g/dL 2.8 (L)   Total Protein Latest Ref Range: 6.0 - 8.2 g/dL 5.4 (L)   Globulin Latest Ref Range: 1.9 - 3.5 g/dL 2.6   A-G Ratio Latest Units: g/dL 1.1    Phosphorus Latest Ref Range: 2.5 - 4.5 mg/dL 3.5   Magnesium Latest Ref Range: 1.5 - 2.5 mg/dL 1.6       Assessment and plans    1. Af/FLUTTER  RATE CONTROLLED ON PROPRANOLOL  CONT Rx  MAY LEAVE OFF ANTICOAG AND D/C FROMOUR STANDPOINT    2.  LV systolic dysfunction could be from alcoholic abuse  With his comorbidity, not a good candidate for aggressive coronary intervention.  We will first treat medically year and repeat echocardiography in 2 to 3 months after abstaining from alcohol.        3.  Severe thrombocytopenia ?hypersplenism  Per primary    Please note that this dictation was created using voice recognition software. I have worked with consultants from the vendor as well as technical experts from [facility] [employer] to optimize the interface. I have made every reasonable attempt to correct obvious errors, but I expect that there are errors of grammar and possibly content I did not discover before finalizing the note

## 2021-03-30 NOTE — DISCHARGE SUMMARY
"Discharge Summary    Date of Admission: 3/26/2021  Date of Discharge: 3/30/2021  8:59 AM  Discharging Attending: Dr. Zhen Jeff  Discharging Senior Resident: Dr. Contreras    CHIEF COMPLAINT ON ADMISSION  Chief Complaint   Patient presents with   • Altered Mental Status     Pt having difficulty maintaining arousal and following commands   • Jaundice     Pt sclera of eyes and skin yellow upon assesment   • Sent by MD     Pt notified by MD that his hemoglobin was \"low\" at [facility] in [place]       Reason for Admission  Alcohol withdrawal    Admission Date  3/26/2021    CODE STATUS  Full Code    HPI & HOSPITAL COURSE  This is a 46 y.o. male here with known Hx of alcohol cirrhosis, variceal GI bleed s/p endoscopy a year ago ([place]), left prior to EGD in last admission. He presented to urgent care for confusion. He was found to have hypokalemia, anemia thrombocytopenia, INR of 1.55 and ct abdomen with mesenteric edema.  Patient was initially admitted to medicine floor but later ICU was consulted for worsening alcohol withdrawal symptoms and possible GI bleed. Patient also developed Aflutter while inpatient and cardiology was consulted. Patient was transferred to ICU and was started on Dexmed drip for withdrawal and Diltiazem drip for Aflutter. On initial presentation patient had low Hb of 7.3 which dropped to 6.8 requiring 1 unit of blood transfusion, no melena/obvious signs of GI bleed but given his hx of varices patient was started on IV PPI BID and later changed to omeprazole BID, GI recommended Endoscopy once his arrhythmia improves. Patient alcohol withdrawal symptoms improved as anticipated, transitioned from Diltiazem drip to propranolol 20 mg TID )hx of portal HTN).  On 03/30, plan was to discuss with GI regarding endoscopy and plan accordingly but patient wished to be discharged, we discussed his medical problems in detail including but not limited to his possible variceal GI bleed due to " alcoholic cirrhosis and portal HTN and it would be best to undergo endoscopy to identify the source and also counseled regarding ill effects of alcohol intake. Patient expressed a good understanding and decided to leave against medical advice, he mentioned he has an appointment with GI on 04/07 and will follow up and has a prescription for pantoprazole. Patient was counseled to return to ED if symptoms worsen. Patient acknowledged and proceeded to leave AMA.    Therefore, he is discharged in fair and stable condition against medcial advice.    The patient met 2-midnight criteria for an inpatient stay at the time of discharge.    PHYSICAL EXAM ON DISCHARGE  Temp:  [36.2 °C (97.2 °F)-36.8 °C (98.2 °F)] 36.3 °C (97.3 °F)  Pulse:  [] 109  Resp:  [16-29] 24  BP: ()/(43-76) 104/65  SpO2:  [93 %-100 %] 96 %    Physical Exam  Constitutional:       Appearance: Normal appearance.   HENT:      Nose: Nose normal. No congestion.      Mouth/Throat:      Mouth: Mucous membranes are moist.   Eyes:      Extraocular Movements: Extraocular movements intact.      Pupils: Pupils are equal, round, and reactive to light.   Cardiovascular:      Rate and Rhythm: Rhythm irregular.      Pulses: Normal pulses.      Heart sounds: Normal heart sounds. No murmur.   Pulmonary:      Effort: Pulmonary effort is normal.   Abdominal:      Palpations: Abdomen is soft.      Comments: Distended abdomen, no tenderness elicited, no ascitics apreciated   Musculoskeletal:         General: No swelling.      Right lower leg: No edema.      Left lower leg: No edema.   Skin:     Capillary Refill: Capillary refill takes less than 2 seconds.   Neurological:      General: No focal deficit present.      Mental Status: He is alert and oriented to person, place, and time.         Discharge Date  3/30/2021    FOLLOW UP ITEMS POST DISCHARGE  Follow up with Gastroenterlogy  Follow up with PCP    DISCHARGE DIAGNOSES  Principal Problem:    Chronic alcohol abuse  POA: Yes  Active Problems:    Anemia POA: Yes    Atrial flutter with rapid ventricular response (HCC) POA: Yes    Alcoholic cirrhosis of liver without ascites (HCC) POA: Yes    Tobacco abuse POA: Yes    Coagulopathy (HCC) POA: Yes    Thrombocytopenia (HCC) POA: Yes    Hepatic encephalopathy (HCC) POA: Unknown    Acute alcoholic gastritis without hemorrhage POA: Unknown      Overview: History of hemorrhage, IV Protonix ordered    Hypokalemia POA: Yes    Hypomagnesemia POA: Yes    Sleep apnea POA: Unknown  Resolved Problems:    Liver cirrhosis (HCC) POA: Unknown      FOLLOW UP  No future appointments.  No follow-up provider specified.    MEDICATIONS ON DISCHARGE     Medication List      ASK your doctor about these medications      Instructions   omeprazole 20 MG delayed-release capsule  Commonly known as: PRILOSEC   Take 20 mg by mouth every day.  Dose: 20 mg     ondansetron 4 MG Tbdp  Commonly known as: Zofran ODT   Take 1 Tab by mouth every 6 hours as needed.  Dose: 4 mg     pantoprazole 40 MG Tbec  Commonly known as: PROTONIX   Take 1 Tab by mouth every day.  Dose: 40 mg     VITAMIN C GUMMIE PO   Take 1 tablet by mouth every day.  Dose: 1 tablet            Allergies  No Known Allergies    DIET  Orders Placed This Encounter   Procedures   • Diet Order Diet: Cardiac     Standing Status:   Standing     Number of Occurrences:   1     Order Specific Question:   Diet:     Answer:   Cardiac [6]       ACTIVITY  As tolerated.  Weight bearing as tolerated    CONSULTATIONS  Dr. Mcmullen with Cardiology consulted.  Treatment options were discussed and plan of care agreed upon.    PROCEDURES  None

## 2021-03-30 NOTE — DISCHARGE PLANNING
Care Transition Team Discharge Planning    Anticipated Discharge Disposition: TBD    Action: Per AM rounds, pt has left AMA. Pt's rounds discussion was skipped as pt has left the building AMA.    Barriers to Discharge: none    Plan: pt left AMA per medical team

## 2021-08-02 NOTE — ASSESSMENT & PLAN NOTE
Hg 3/24 was stable at 7.3, 3/26 7.6  Monitor Hg, transfusion for Hg < 7  GI to be called when improved for EGD ([facility])  Continue PPI, will transition to PO today  S/p 1 unit PRBC 3/27 post 2L of fluids   2018  3120 grams

## 2021-11-09 ENCOUNTER — HOSPITAL ENCOUNTER (INPATIENT)
Facility: MEDICAL CENTER | Age: 46
LOS: 1 days | DRG: 433 | End: 2021-11-11
Attending: EMERGENCY MEDICINE | Admitting: INTERNAL MEDICINE
Payer: COMMERCIAL

## 2021-11-09 DIAGNOSIS — D68.9 COAGULOPATHY (HCC): ICD-10-CM

## 2021-11-09 DIAGNOSIS — K92.0 GASTROINTESTINAL HEMORRHAGE WITH HEMATEMESIS: ICD-10-CM

## 2021-11-09 DIAGNOSIS — K70.30 ALCOHOLIC CIRRHOSIS, UNSPECIFIED WHETHER ASCITES PRESENT (HCC): ICD-10-CM

## 2021-11-09 DIAGNOSIS — K92.0 HEMATEMESIS WITH NAUSEA: ICD-10-CM

## 2021-11-09 DIAGNOSIS — K70.10 ALCOHOLIC HEPATITIS WITHOUT ASCITES: ICD-10-CM

## 2021-11-09 DIAGNOSIS — E11.65 TYPE 2 DIABETES MELLITUS WITH HYPERGLYCEMIA, WITHOUT LONG-TERM CURRENT USE OF INSULIN (HCC): ICD-10-CM

## 2021-11-09 LAB
ABO GROUP BLD: ABNORMAL
ALBUMIN SERPL BCP-MCNC: 3.6 G/DL (ref 3.2–4.9)
ALBUMIN/GLOB SERPL: 1.2 G/DL
ALP SERPL-CCNC: 192 U/L (ref 30–99)
ALT SERPL-CCNC: 35 U/L (ref 2–50)
ANION GAP SERPL CALC-SCNC: 15 MMOL/L (ref 7–16)
APTT PPP: 40.2 SEC (ref 24.7–36)
AST SERPL-CCNC: 57 U/L (ref 12–45)
BARCODED ABORH UBTYP: 1700
BARCODED ABORH UBTYP: 5100
BARCODED PRD CODE UBPRD: NORMAL
BARCODED PRD CODE UBPRD: NORMAL
BARCODED UNIT NUM UBUNT: NORMAL
BARCODED UNIT NUM UBUNT: NORMAL
BILIRUB SERPL-MCNC: 7.7 MG/DL (ref 0.1–1.5)
BLD GP AB SCN SERPL QL: ABNORMAL
BUN SERPL-MCNC: 16 MG/DL (ref 8–22)
CALCIUM SERPL-MCNC: 8.3 MG/DL (ref 8.5–10.5)
CHLORIDE SERPL-SCNC: 103 MMOL/L (ref 96–112)
CO2 SERPL-SCNC: 19 MMOL/L (ref 20–33)
COMPONENT F 8504F: NORMAL
COMPONENT F 8504F: NORMAL
CREAT SERPL-MCNC: 0.33 MG/DL (ref 0.5–1.4)
ERYTHROCYTE [DISTWIDTH] IN BLOOD BY AUTOMATED COUNT: 57.8 FL (ref 35.9–50)
ETHANOL BLD-MCNC: 123.7 MG/DL (ref 0–10)
GLOBULIN SER CALC-MCNC: 2.9 G/DL (ref 1.9–3.5)
GLUCOSE SERPL-MCNC: 376 MG/DL (ref 65–99)
HCT VFR BLD AUTO: 34 % (ref 42–52)
HGB BLD-MCNC: 10.2 G/DL (ref 14–18)
INR PPP: 1.93 (ref 0.87–1.13)
LIPASE SERPL-CCNC: 35 U/L (ref 11–82)
MAGNESIUM SERPL-MCNC: 1.6 MG/DL (ref 1.5–2.5)
MCH RBC QN AUTO: 24.8 PG (ref 27–33)
MCHC RBC AUTO-ENTMCNC: 30 G/DL (ref 33.7–35.3)
MCV RBC AUTO: 82.5 FL (ref 81.4–97.8)
PHOSPHATE SERPL-MCNC: 4.2 MG/DL (ref 2.5–4.5)
PLATELET # BLD AUTO: 72 K/UL (ref 164–446)
POTASSIUM SERPL-SCNC: 5.6 MMOL/L (ref 3.6–5.5)
PRODUCT TYPE UPROD: NORMAL
PRODUCT TYPE UPROD: NORMAL
PROT SERPL-MCNC: 6.5 G/DL (ref 6–8.2)
PROTHROMBIN TIME: 21.5 SEC (ref 12–14.6)
RBC # BLD AUTO: 4.12 M/UL (ref 4.7–6.1)
RH BLD: ABNORMAL
SODIUM SERPL-SCNC: 137 MMOL/L (ref 135–145)
UNIT STATUS USTAT: NORMAL
UNIT STATUS USTAT: NORMAL
WBC # BLD AUTO: 9.2 K/UL (ref 4.8–10.8)

## 2021-11-09 PROCEDURE — 36415 COLL VENOUS BLD VENIPUNCTURE: CPT

## 2021-11-09 PROCEDURE — P9017 PLASMA 1 DONOR FRZ W/IN 8 HR: HCPCS

## 2021-11-09 PROCEDURE — 96365 THER/PROPH/DIAG IV INF INIT: CPT

## 2021-11-09 PROCEDURE — 86901 BLOOD TYPING SEROLOGIC RH(D): CPT

## 2021-11-09 PROCEDURE — C9113 INJ PANTOPRAZOLE SODIUM, VIA: HCPCS | Performed by: EMERGENCY MEDICINE

## 2021-11-09 PROCEDURE — 80053 COMPREHEN METABOLIC PANEL: CPT

## 2021-11-09 PROCEDURE — 30233K1 TRANSFUSION OF NONAUTOLOGOUS FROZEN PLASMA INTO PERIPHERAL VEIN, PERCUTANEOUS APPROACH: ICD-10-PCS | Performed by: EMERGENCY MEDICINE

## 2021-11-09 PROCEDURE — 99285 EMERGENCY DEPT VISIT HI MDM: CPT

## 2021-11-09 PROCEDURE — 85610 PROTHROMBIN TIME: CPT

## 2021-11-09 PROCEDURE — 84100 ASSAY OF PHOSPHORUS: CPT

## 2021-11-09 PROCEDURE — 82077 ASSAY SPEC XCP UR&BREATH IA: CPT

## 2021-11-09 PROCEDURE — 700111 HCHG RX REV CODE 636 W/ 250 OVERRIDE (IP): Performed by: EMERGENCY MEDICINE

## 2021-11-09 PROCEDURE — 700105 HCHG RX REV CODE 258: Performed by: EMERGENCY MEDICINE

## 2021-11-09 PROCEDURE — 36430 TRANSFUSION BLD/BLD COMPNT: CPT

## 2021-11-09 PROCEDURE — 83690 ASSAY OF LIPASE: CPT

## 2021-11-09 PROCEDURE — 85730 THROMBOPLASTIN TIME PARTIAL: CPT

## 2021-11-09 PROCEDURE — 86900 BLOOD TYPING SEROLOGIC ABO: CPT

## 2021-11-09 PROCEDURE — 85027 COMPLETE CBC AUTOMATED: CPT

## 2021-11-09 PROCEDURE — 96366 THER/PROPH/DIAG IV INF ADDON: CPT

## 2021-11-09 PROCEDURE — 86850 RBC ANTIBODY SCREEN: CPT

## 2021-11-09 PROCEDURE — 96375 TX/PRO/DX INJ NEW DRUG ADDON: CPT

## 2021-11-09 PROCEDURE — 83735 ASSAY OF MAGNESIUM: CPT

## 2021-11-09 PROCEDURE — 99220 PR INITIAL OBSERVATION CARE,LEVL III: CPT | Performed by: STUDENT IN AN ORGANIZED HEALTH CARE EDUCATION/TRAINING PROGRAM

## 2021-11-09 PROCEDURE — G0378 HOSPITAL OBSERVATION PER HR: HCPCS

## 2021-11-09 RX ORDER — CEFTRIAXONE 1 G/1
1 INJECTION, POWDER, FOR SOLUTION INTRAMUSCULAR; INTRAVENOUS ONCE
Status: COMPLETED | OUTPATIENT
Start: 2021-11-09 | End: 2021-11-09

## 2021-11-09 RX ORDER — SODIUM CHLORIDE 9 MG/ML
INJECTION, SOLUTION INTRAVENOUS CONTINUOUS
Status: ACTIVE | OUTPATIENT
Start: 2021-11-09 | End: 2021-11-10

## 2021-11-09 RX ADMIN — SODIUM CHLORIDE 80 MG: 9 INJECTION, SOLUTION INTRAVENOUS at 20:48

## 2021-11-09 RX ADMIN — SODIUM CHLORIDE: 9 INJECTION, SOLUTION INTRAVENOUS at 21:45

## 2021-11-09 RX ADMIN — OCTREOTIDE ACETATE 50 MCG/HR: 200 INJECTION, SOLUTION INTRAVENOUS; SUBCUTANEOUS at 21:19

## 2021-11-09 RX ADMIN — CEFTRIAXONE SODIUM 1 G: 1 INJECTION, POWDER, FOR SOLUTION INTRAMUSCULAR; INTRAVENOUS at 20:49

## 2021-11-09 ASSESSMENT — ENCOUNTER SYMPTOMS
HEADACHES: 0
HEMOPTYSIS: 0
COUGH: 0
DIZZINESS: 0
VOMITING: 1
NAUSEA: 1
PALPITATIONS: 0
FEVER: 0
DEPRESSION: 0
HEARTBURN: 0
ABDOMINAL PAIN: 1
DOUBLE VISION: 0
BLURRED VISION: 0
BRUISES/BLEEDS EASILY: 0
NECK PAIN: 0
MYALGIAS: 0
CHILLS: 0

## 2021-11-09 ASSESSMENT — FIBROSIS 4 INDEX: FIB4 SCORE: 6.27

## 2021-11-09 ASSESSMENT — PAIN DESCRIPTION - PAIN TYPE
TYPE: ACUTE PAIN
TYPE: ACUTE PAIN

## 2021-11-10 ENCOUNTER — ANESTHESIA EVENT (OUTPATIENT)
Dept: SURGERY | Facility: MEDICAL CENTER | Age: 46
DRG: 433 | End: 2021-11-10
Payer: COMMERCIAL

## 2021-11-10 ENCOUNTER — ANESTHESIA (OUTPATIENT)
Dept: SURGERY | Facility: MEDICAL CENTER | Age: 46
DRG: 433 | End: 2021-11-10
Payer: COMMERCIAL

## 2021-11-10 LAB
AMMONIA PLAS-SCNC: 51 UMOL/L (ref 11–45)
ANISOCYTOSIS BLD QL SMEAR: ABNORMAL
BARCODED ABORH UBTYP: 5100
BARCODED PRD CODE UBPRD: NORMAL
BARCODED UNIT NUM UBUNT: NORMAL
BASOPHILS # BLD AUTO: 1.8 % (ref 0–1.8)
BASOPHILS # BLD: 0.08 K/UL (ref 0–0.12)
COMMENT 1642: NORMAL
COMPONENT P 8504P: NORMAL
EKG IMPRESSION: NORMAL
EOSINOPHIL # BLD AUTO: 0.23 K/UL (ref 0–0.51)
EOSINOPHIL NFR BLD: 5.1 % (ref 0–6.9)
ERYTHROCYTE [DISTWIDTH] IN BLOOD BY AUTOMATED COUNT: 57.9 FL (ref 35.9–50)
EXTERNAL QUALITY CONTROL: NORMAL
EXTERNAL QUALITY CONTROL: NORMAL
FERRITIN SERPL-MCNC: 35.7 NG/ML (ref 22–322)
HBV CORE AB SERPL QL IA: NONREACTIVE
HBV SURFACE AB SERPL IA-ACNC: <3.5 MIU/ML (ref 0–10)
HBV SURFACE AG SER QL: NORMAL
HCT VFR BLD AUTO: 25.2 % (ref 42–52)
HCV AB SER QL: NORMAL
HGB BLD-MCNC: 7.5 G/DL (ref 14–18)
HGB BLD-MCNC: 7.9 G/DL (ref 14–18)
HYPOCHROMIA BLD QL SMEAR: ABNORMAL
IMM GRANULOCYTES # BLD AUTO: 0.01 K/UL (ref 0–0.11)
IMM GRANULOCYTES NFR BLD AUTO: 0.2 % (ref 0–0.9)
IRON SATN MFR SERPL: 66 % (ref 15–55)
IRON SERPL-MCNC: 193 UG/DL (ref 50–180)
LYMPHOCYTES # BLD AUTO: 1.03 K/UL (ref 1–4.8)
LYMPHOCYTES NFR BLD: 22.9 % (ref 22–41)
MACROCYTES BLD QL SMEAR: ABNORMAL
MCH RBC QN AUTO: 24.5 PG (ref 27–33)
MCHC RBC AUTO-ENTMCNC: 29.8 G/DL (ref 33.7–35.3)
MCV RBC AUTO: 82.4 FL (ref 81.4–97.8)
MICROCYTES BLD QL SMEAR: ABNORMAL
MONOCYTES # BLD AUTO: 0.61 K/UL (ref 0–0.85)
MONOCYTES NFR BLD AUTO: 13.6 % (ref 0–13.4)
MORPHOLOGY BLD-IMP: NORMAL
NEUTROPHILS # BLD AUTO: 2.53 K/UL (ref 1.82–7.42)
NEUTROPHILS NFR BLD: 56.4 % (ref 44–72)
NRBC # BLD AUTO: 0 K/UL
NRBC BLD-RTO: 0 /100 WBC
OVALOCYTES BLD QL SMEAR: NORMAL
PLATELET # BLD AUTO: 55 K/UL (ref 164–446)
PLATELET BLD QL SMEAR: NORMAL
PLATELETS.RETICULATED NFR BLD AUTO: 7.7 K/UL (ref 0.6–13.1)
PMV BLD AUTO: 10.6 FL (ref 9–12.9)
POIKILOCYTOSIS BLD QL SMEAR: NORMAL
PRODUCT TYPE UPROD: NORMAL
RBC # BLD AUTO: 3.06 M/UL (ref 4.7–6.1)
RBC BLD AUTO: PRESENT
SARS-COV+SARS-COV-2 AG RESP QL IA.RAPID: NEGATIVE
SARS-COV+SARS-COV-2 AG RESP QL IA.RAPID: NEGATIVE
TARGETS BLD QL SMEAR: NORMAL
TIBC SERPL-MCNC: 292 UG/DL (ref 250–450)
UIBC SERPL-MCNC: 99 UG/DL (ref 110–370)
UNIT STATUS USTAT: NORMAL
WBC # BLD AUTO: 4.5 K/UL (ref 4.8–10.8)

## 2021-11-10 PROCEDURE — A9270 NON-COVERED ITEM OR SERVICE: HCPCS | Performed by: INTERNAL MEDICINE

## 2021-11-10 PROCEDURE — 700105 HCHG RX REV CODE 258: Performed by: STUDENT IN AN ORGANIZED HEALTH CARE EDUCATION/TRAINING PROGRAM

## 2021-11-10 PROCEDURE — 96368 THER/DIAG CONCURRENT INF: CPT

## 2021-11-10 PROCEDURE — 99233 SBSQ HOSP IP/OBS HIGH 50: CPT | Performed by: INTERNAL MEDICINE

## 2021-11-10 PROCEDURE — 85025 COMPLETE CBC W/AUTO DIFF WBC: CPT

## 2021-11-10 PROCEDURE — 700111 HCHG RX REV CODE 636 W/ 250 OVERRIDE (IP): Performed by: ANESTHESIOLOGY

## 2021-11-10 PROCEDURE — 96366 THER/PROPH/DIAG IV INF ADDON: CPT

## 2021-11-10 PROCEDURE — 85055 RETICULATED PLATELET ASSAY: CPT

## 2021-11-10 PROCEDURE — 700105 HCHG RX REV CODE 258: Performed by: INTERNAL MEDICINE

## 2021-11-10 PROCEDURE — 770020 HCHG ROOM/CARE - TELE (206)

## 2021-11-10 PROCEDURE — 160035 HCHG PACU - 1ST 60 MINS PHASE I: Performed by: INTERNAL MEDICINE

## 2021-11-10 PROCEDURE — 160202 HCHG ENDO MINUTES - 1ST 30 MINS LEVEL 3: Performed by: INTERNAL MEDICINE

## 2021-11-10 PROCEDURE — 700111 HCHG RX REV CODE 636 W/ 250 OVERRIDE (IP): Performed by: INTERNAL MEDICINE

## 2021-11-10 PROCEDURE — 87426 SARSCOV CORONAVIRUS AG IA: CPT | Performed by: INTERNAL MEDICINE

## 2021-11-10 PROCEDURE — 0DJ68ZZ INSPECTION OF STOMACH, VIA NATURAL OR ARTIFICIAL OPENING ENDOSCOPIC: ICD-10-PCS | Performed by: INTERNAL MEDICINE

## 2021-11-10 PROCEDURE — 30233R1 TRANSFUSION OF NONAUTOLOGOUS PLATELETS INTO PERIPHERAL VEIN, PERCUTANEOUS APPROACH: ICD-10-PCS | Performed by: INTERNAL MEDICINE

## 2021-11-10 PROCEDURE — 86803 HEPATITIS C AB TEST: CPT

## 2021-11-10 PROCEDURE — 700111 HCHG RX REV CODE 636 W/ 250 OVERRIDE (IP): Performed by: STUDENT IN AN ORGANIZED HEALTH CARE EDUCATION/TRAINING PROGRAM

## 2021-11-10 PROCEDURE — 160048 HCHG OR STATISTICAL LEVEL 1-5: Performed by: INTERNAL MEDICINE

## 2021-11-10 PROCEDURE — 700101 HCHG RX REV CODE 250: Performed by: ANESTHESIOLOGY

## 2021-11-10 PROCEDURE — 700102 HCHG RX REV CODE 250 W/ 637 OVERRIDE(OP): Performed by: INTERNAL MEDICINE

## 2021-11-10 PROCEDURE — 83550 IRON BINDING TEST: CPT

## 2021-11-10 PROCEDURE — 160002 HCHG RECOVERY MINUTES (STAT): Performed by: INTERNAL MEDICINE

## 2021-11-10 PROCEDURE — 160009 HCHG ANES TIME/MIN: Performed by: INTERNAL MEDICINE

## 2021-11-10 PROCEDURE — 93005 ELECTROCARDIOGRAM TRACING: CPT | Performed by: STUDENT IN AN ORGANIZED HEALTH CARE EDUCATION/TRAINING PROGRAM

## 2021-11-10 PROCEDURE — 86706 HEP B SURFACE ANTIBODY: CPT

## 2021-11-10 PROCEDURE — 85018 HEMOGLOBIN: CPT

## 2021-11-10 PROCEDURE — P9034 PLATELETS, PHERESIS: HCPCS

## 2021-11-10 PROCEDURE — 87340 HEPATITIS B SURFACE AG IA: CPT

## 2021-11-10 PROCEDURE — 36430 TRANSFUSION BLD/BLD COMPNT: CPT

## 2021-11-10 PROCEDURE — 86708 HEPATITIS A ANTIBODY: CPT

## 2021-11-10 PROCEDURE — 96375 TX/PRO/DX INJ NEW DRUG ADDON: CPT

## 2021-11-10 PROCEDURE — 86704 HEP B CORE ANTIBODY TOTAL: CPT

## 2021-11-10 PROCEDURE — 82728 ASSAY OF FERRITIN: CPT

## 2021-11-10 PROCEDURE — 82140 ASSAY OF AMMONIA: CPT

## 2021-11-10 PROCEDURE — 700105 HCHG RX REV CODE 258: Performed by: ANESTHESIOLOGY

## 2021-11-10 PROCEDURE — C9113 INJ PANTOPRAZOLE SODIUM, VIA: HCPCS | Performed by: STUDENT IN AN ORGANIZED HEALTH CARE EDUCATION/TRAINING PROGRAM

## 2021-11-10 PROCEDURE — 83540 ASSAY OF IRON: CPT

## 2021-11-10 PROCEDURE — 93010 ELECTROCARDIOGRAM REPORT: CPT | Performed by: INTERNAL MEDICINE

## 2021-11-10 RX ORDER — HALOPERIDOL 5 MG/ML
1 INJECTION INTRAMUSCULAR
Status: DISCONTINUED | OUTPATIENT
Start: 2021-11-10 | End: 2021-11-10 | Stop reason: HOSPADM

## 2021-11-10 RX ORDER — SODIUM CHLORIDE, SODIUM LACTATE, POTASSIUM CHLORIDE, CALCIUM CHLORIDE 600; 310; 30; 20 MG/100ML; MG/100ML; MG/100ML; MG/100ML
INJECTION, SOLUTION INTRAVENOUS
Status: DISCONTINUED | OUTPATIENT
Start: 2021-11-10 | End: 2021-11-10 | Stop reason: SURG

## 2021-11-10 RX ORDER — ONDANSETRON 2 MG/ML
4 INJECTION INTRAMUSCULAR; INTRAVENOUS EVERY 4 HOURS PRN
Status: DISCONTINUED | OUTPATIENT
Start: 2021-11-10 | End: 2021-11-11 | Stop reason: HOSPADM

## 2021-11-10 RX ORDER — ONDANSETRON 2 MG/ML
4 INJECTION INTRAMUSCULAR; INTRAVENOUS
Status: DISCONTINUED | OUTPATIENT
Start: 2021-11-10 | End: 2021-11-10 | Stop reason: HOSPADM

## 2021-11-10 RX ORDER — OMEPRAZOLE 20 MG/1
20 CAPSULE, DELAYED RELEASE ORAL 2 TIMES DAILY
Status: DISCONTINUED | OUTPATIENT
Start: 2021-11-10 | End: 2021-11-11 | Stop reason: HOSPADM

## 2021-11-10 RX ORDER — OXYCODONE HCL 5 MG/5 ML
10 SOLUTION, ORAL ORAL
Status: DISCONTINUED | OUTPATIENT
Start: 2021-11-10 | End: 2021-11-10 | Stop reason: HOSPADM

## 2021-11-10 RX ORDER — DIPHENHYDRAMINE HYDROCHLORIDE 50 MG/ML
12.5 INJECTION INTRAMUSCULAR; INTRAVENOUS
Status: DISCONTINUED | OUTPATIENT
Start: 2021-11-10 | End: 2021-11-10 | Stop reason: HOSPADM

## 2021-11-10 RX ORDER — PROCHLORPERAZINE EDISYLATE 5 MG/ML
5-10 INJECTION INTRAMUSCULAR; INTRAVENOUS EVERY 6 HOURS PRN
Status: DISCONTINUED | OUTPATIENT
Start: 2021-11-10 | End: 2021-11-11 | Stop reason: HOSPADM

## 2021-11-10 RX ORDER — OXYCODONE HCL 5 MG/5 ML
5 SOLUTION, ORAL ORAL
Status: DISCONTINUED | OUTPATIENT
Start: 2021-11-10 | End: 2021-11-10 | Stop reason: HOSPADM

## 2021-11-10 RX ORDER — LIDOCAINE HYDROCHLORIDE 20 MG/ML
INJECTION, SOLUTION EPIDURAL; INFILTRATION; INTRACAUDAL; PERINEURAL PRN
Status: DISCONTINUED | OUTPATIENT
Start: 2021-11-10 | End: 2021-11-10 | Stop reason: SURG

## 2021-11-10 RX ORDER — SODIUM CHLORIDE, SODIUM LACTATE, POTASSIUM CHLORIDE, CALCIUM CHLORIDE 600; 310; 30; 20 MG/100ML; MG/100ML; MG/100ML; MG/100ML
INJECTION, SOLUTION INTRAVENOUS CONTINUOUS
Status: DISCONTINUED | OUTPATIENT
Start: 2021-11-10 | End: 2021-11-10 | Stop reason: HOSPADM

## 2021-11-10 RX ORDER — SUCRALFATE ORAL 1 G/10ML
1 SUSPENSION ORAL EVERY 6 HOURS
Status: DISCONTINUED | OUTPATIENT
Start: 2021-11-10 | End: 2021-11-11 | Stop reason: HOSPADM

## 2021-11-10 RX ORDER — LORAZEPAM 2 MG/ML
0.5 INJECTION INTRAMUSCULAR
Status: DISCONTINUED | OUTPATIENT
Start: 2021-11-10 | End: 2021-11-10 | Stop reason: HOSPADM

## 2021-11-10 RX ADMIN — ONDANSETRON 4 MG: 2 INJECTION INTRAMUSCULAR; INTRAVENOUS at 04:20

## 2021-11-10 RX ADMIN — OMEPRAZOLE 20 MG: 20 CAPSULE, DELAYED RELEASE ORAL at 17:39

## 2021-11-10 RX ADMIN — SUCRALFATE 1 G: 1 SUSPENSION ORAL at 16:50

## 2021-11-10 RX ADMIN — PROPOFOL 100 MG: 10 INJECTION, EMULSION INTRAVENOUS at 13:08

## 2021-11-10 RX ADMIN — PROPOFOL 50 MG: 10 INJECTION, EMULSION INTRAVENOUS at 13:12

## 2021-11-10 RX ADMIN — SODIUM CHLORIDE 8 MG/HR: 9 INJECTION, SOLUTION INTRAVENOUS at 10:55

## 2021-11-10 RX ADMIN — PROPOFOL 30 MG: 10 INJECTION, EMULSION INTRAVENOUS at 13:16

## 2021-11-10 RX ADMIN — LIDOCAINE HYDROCHLORIDE 100 MG: 20 INJECTION, SOLUTION EPIDURAL; INFILTRATION; INTRACAUDAL at 13:08

## 2021-11-10 RX ADMIN — CEFTRIAXONE SODIUM 2 G: 2 INJECTION, POWDER, FOR SOLUTION INTRAMUSCULAR; INTRAVENOUS at 08:29

## 2021-11-10 RX ADMIN — SODIUM CHLORIDE, POTASSIUM CHLORIDE, SODIUM LACTATE AND CALCIUM CHLORIDE: 600; 310; 30; 20 INJECTION, SOLUTION INTRAVENOUS at 13:05

## 2021-11-10 RX ADMIN — PROPOFOL 50 MG: 10 INJECTION, EMULSION INTRAVENOUS at 13:10

## 2021-11-10 RX ADMIN — SUCRALFATE 1 G: 1 SUSPENSION ORAL at 20:17

## 2021-11-10 RX ADMIN — SODIUM CHLORIDE 8 MG/HR: 9 INJECTION, SOLUTION INTRAVENOUS at 00:06

## 2021-11-10 RX ADMIN — PROCHLORPERAZINE EDISYLATE 10 MG: 5 INJECTION INTRAMUSCULAR; INTRAVENOUS at 05:58

## 2021-11-10 RX ADMIN — PROPOFOL 50 MG: 10 INJECTION, EMULSION INTRAVENOUS at 13:14

## 2021-11-10 ASSESSMENT — PAIN SCALES - GENERAL: PAIN_LEVEL: 0

## 2021-11-10 ASSESSMENT — ENCOUNTER SYMPTOMS
CHILLS: 0
VOMITING: 1
DIARRHEA: 0
HEADACHES: 0
BLOOD IN STOOL: 0
FEVER: 0
NAUSEA: 1
SHORTNESS OF BREATH: 0
NAUSEA: 0
DIAPHORESIS: 0
NECK PAIN: 0
HEARTBURN: 1
COUGH: 0
WHEEZING: 0
BRUISES/BLEEDS EASILY: 0
SEIZURES: 0
SORE THROAT: 0
ABDOMINAL PAIN: 0
DIZZINESS: 0
MYALGIAS: 0
BLURRED VISION: 0
PALPITATIONS: 0
SPUTUM PRODUCTION: 0
FLANK PAIN: 0
BACK PAIN: 0
HEMOPTYSIS: 0
MUSCULOSKELETAL NEGATIVE: 1
VOMITING: 0
FOCAL WEAKNESS: 0

## 2021-11-10 ASSESSMENT — PAIN DESCRIPTION - PAIN TYPE
TYPE: ACUTE PAIN
TYPE: ACUTE PAIN

## 2021-11-10 NOTE — ANESTHESIA PREPROCEDURE EVALUATION
Case: [id number] Date/Time: 11/10/21 1350    Procedure: GASTROSCOPY (N/A Esophagus)    Anesthesia type: General    Pre-op diagnosis: Hematemesis    Location: [facility] ROOM 26 / SURGERY SAME DAY [facility]    Surgeons: Fransico Lindquist M.D.          Relevant Problems   ANESTHESIA   (positive) Sleep apnea      CARDIAC   (positive) Atrial flutter with rapid ventricular response (HCC)         (positive) Alcoholic cirrhosis of liver without ascites (HCC)      Other   (positive) Chronic alcohol abuse   (positive) Coagulopathy (HCC)   (positive) Gastrointestinal hemorrhage with hematemesis   (positive) Thrombocytopenia (HCC)   (positive) Tobacco abuse       Physical Exam    Airway   Mallampati: II  TM distance: >3 FB  Neck ROM: full       Cardiovascular - normal exam  Rhythm: regular  Rate: normal  (-) murmur     Dental - normal exam           Pulmonary - normal exam  Breath sounds clear to auscultation     Abdominal    Neurological - normal exam                 Anesthesia Plan    ASA 3   ASA physical status 3 criteria: alcohol and/or substance dependence or abuse    Plan - MAC               Induction: intravenous      Pertinent diagnostic labs and testing reviewed    Informed Consent:    Anesthetic plan and risks discussed with patient.

## 2021-11-10 NOTE — ED PROVIDER NOTES
ED Provider Note    CHIEF COMPLAINT  Chief Complaint   Patient presents with   • Blood in Vomit       HPI  Enrique Drake is a 46 y.o. male who presents with hematemesis, history of esophageal varices and cirrhosis secondary to alcohol abuse.  Transferred here from an outside facility.  The patient states that starting around 2:00 this afternoon he had a total of 3 episodes of jessica bright red blood, no abdominal pain, and was nauseated before this but have really no other acute symptoms.  At the outside facility he was found to be tachycardic in the 140s.  Was transfused 2 units of packed RBCs, he received octreotide as well as vitamin K after having an elevated INR and PTT.  His initial hemoglobin is 8.7.  He is also found to be somewhat thrombocytopenic.  The patient was transferred here for GI evaluation.  Has no other acute complaints at this time.    REVIEW OF SYSTEMS  Negative for fever, rash, chest pain, dyspnea, abdominal pain, diarrhea, headache, focal weakness, focal numbness, focal tingling, back pain. All other systems are negative.     PAST MEDICAL HISTORY  No past medical history on file.    FAMILY HISTORY  Family History   Problem Relation Age of Onset   • Other Mother         smoker   • Lung Disease Mother         COPD   • Diabetes Father    • Other Brother         gout   • Heart Attack Maternal Grandfather    • Other Brother         epilepsy       SOCIAL HISTORY  Social History     Tobacco Use   • Smoking status: Current Every Day Smoker     Packs/day: 1.00     Years: 20.00     Pack years: 20.00     Types: Cigarettes     Last attempt to quit: 3/3/2013     Years since quittin.6   • Smokeless tobacco: Former User     Types: Chew     Quit date: 3/4/2012   • Tobacco comment: chewed for 10 years   Substance Use Topics   • Alcohol use: Not Currently     Alcohol/week: 21.0 oz     Types: 42 Cans of beer per week   • Drug use: Yes     Types: Marijuana     Comment: at night prn       SURGICAL  HISTORY  No past surgical history on file.    CURRENT MEDICATIONS  I personally reviewed the medication list in the charting documentation.     ALLERGIES  No Known Allergies    MEDICAL RECORD  I have reviewed patient's medical record and pertinent results are listed above.      PHYSICAL EXAM  VITAL SIGNS: /72   Pulse (!) 108   Temp 35.9 °C (96.7 °F) (Temporal)   Resp 16   Ht 1.829 m (6')   Wt 99.8 kg (220 lb)   SpO2 96%   BMI 29.84 kg/m²    Constitutional: Chronically ill-appearing, jaundiced  HENT: Normocephalic, no obvious evidence of acute trauma.  Eyes: Scleral icterus  Neck: Comfortable movement without any obvious restriction in the range of motion.  Cardiovascular: Upon ascultation I appreciate a regular heart rhythm and a tachycardic rate.   Thorax & Lungs: Normal nonlabored respirations.  Upon application of the stethoscope for auscultation I find there to be no associated chest wall tenderness.  I appreciate no wheezing, rhonchi or rales. There is normal air movement.    Abdomen: The abdomen is not visibly distended. Upon palpation, I find it to be without tenderness.  No mass appreciated.  Skin: The exposed portions of skin reveal no obvious rash or other abnormalities.  Extremities/Musculoskeletal: No obvious sign of acute trauma. No asymmetric calf tenderness or edema.   Neurologic: Alert & oriented. No focal deficits observed.   Psychiatric: Normal affect appropriate for the clinical situation.    DIAGNOSTIC STUDIES / PROCEDURES    LABS/EKGs  Results for orders placed or performed during the hospital encounter of 11/09/21   COMP METABOLIC PANEL   Result Value Ref Range    Sodium 137 135 - 145 mmol/L    Potassium 5.6 (H) 3.6 - 5.5 mmol/L    Chloride 103 96 - 112 mmol/L    Co2 19 (L) 20 - 33 mmol/L    Anion Gap 15.0 7.0 - 16.0    Glucose 376 (H) 65 - 99 mg/dL    Bun 16 8 - 22 mg/dL    Creatinine 0.33 (L) 0.50 - 1.40 mg/dL    Calcium 8.3 (L) 8.5 - 10.5 mg/dL    AST(SGOT) 57 (H) 12 - 45 U/L     ALT(SGPT) 35 2 - 50 U/L    Alkaline Phosphatase 192 (H) 30 - 99 U/L    Total Bilirubin 7.7 (H) 0.1 - 1.5 mg/dL    Albumin 3.6 3.2 - 4.9 g/dL    Total Protein 6.5 6.0 - 8.2 g/dL    Globulin 2.9 1.9 - 3.5 g/dL    A-G Ratio 1.2 g/dL   CBC WITHOUT DIFFERENTIAL   Result Value Ref Range    WBC 9.2 4.8 - 10.8 K/uL    RBC 4.12 (L) 4.70 - 6.10 M/uL    Hemoglobin 10.2 (L) 14.0 - 18.0 g/dL    Hematocrit 34.0 (L) 42.0 - 52.0 %    MCV 82.5 81.4 - 97.8 fL    MCH 24.8 (L) 27.0 - 33.0 pg    MCHC 30.0 (L) 33.7 - 35.3 g/dL    RDW 57.8 (H) 35.9 - 50.0 fL    Platelet Count 72 (L) 164 - 446 K/uL   LIPASE   Result Value Ref Range    Lipase 35 11 - 82 U/L   PROTHROMBIN TIME (INR)   Result Value Ref Range    PT 21.5 (H) 12.0 - 14.6 sec    INR 1.93 (H) 0.87 - 1.13   APTT   Result Value Ref Range    APTT 40.2 (H) 24.7 - 36.0 sec   COD (ADULT)   Result Value Ref Range    ABO Grouping Only O     Rh Grouping Only POS (A)     Antibody Screen-Cod NEG    ESTIMATED GFR   Result Value Ref Range    GFR If African American >60 >60 mL/min/1.73 m 2    GFR If Non African American >60 >60 mL/min/1.73 m 2   FRESH FROZEN PLASMA   Result Value Ref Range    Component F       TA                  Thawed Plasma       [id number]   issued       11/09/21   22:27      Product Type Thawed Apheresis Plasma     Dispense Status issued     Unit Number (Barcoded) [id number]     Product Code (Barcoded) Z6209Z84     Blood Type (Barcoded) 1700     Component F       TA1                 Thawed Plasma 1     [id number]   issued       11/09/21   23:15      Product Type Thawed Apheresis Plasma 1st Cont     Dispense Status issued     Unit Number (Barcoded) [id number]     Product Code (Barcoded) G1353G37     Blood Type (Barcoded) 5100           COURSE & MEDICAL DECISION MAKING  I have reviewed any medical record information, laboratory studies and radiographic results as noted above.    Encounter Summary: This is a very pleasant 46 y.o. male who unfortunately required  evaluation in the emergency department today with 3 episodes of hematemesis with a history of cirrhosis and known esophageal varices.  At the outside facility was found to be tachycardic in the 140s, blood pressure was okay, he was transfused 2 units of packed red blood cells and given vitamin K, octreotide was initiated and the patient was transferred here.  Upon arrival he remains tachycardic, his blood pressure is adequate, the patient has no pain and has not had any episodes of vomiting since being in the outside emergency department.  The octreotide infusion has been to be continued.  I will transfuse FFP given his coagulopathy, I have consulted Dr. Mcgrath, on-call gastroenterologist.  The plan is for admission the hospital, he will consult in the morning unless the patient has a recurrence of his hematemesis.  Blood work will be obtained, the patient will be watched quite closely as he is at a high chance of acute decompensation requiring the highest level preparedness for emergent interventions ------- his hemoglobin resulted 10.2 which is reassuring, a two-point increase after 2 units of transfusion.  His chemistry panel reveals mild hyperkalemia with normal renal function.  At this point patient is admitted to the hospital in guarded condition.  He remains with a normal blood pressure although is persistently tachycardic.  No further episodes of hematemesis      CRITICAL CARE  The very real possibilty of a deterioration of this patient's condition required the highest level of my preparedness for sudden, emergent intervention.  I provided critical care services, which included medication orders, frequent reevaluations of the patient's condition and response to treatment, ordering and reviewing test results, and discussing the case with various consultants.  The critical care time associated with the care of the patient was 37 minutes. Review chart for interventions. This time is exclusive of any other  billable procedures.         DISPOSITION: Admit in guarded condition      FINAL IMPRESSION  1. Hematemesis with nausea    2. Alcoholic cirrhosis, unspecified whether ascites present (HCC)    3. Coagulopathy (HCC)           This dictation was created using voice recognition software. The accuracy of the dictation is limited to the abilities of the software. I expect there may be some errors of grammar and possibly content. The nursing notes were reviewed and certain aspects of this information were incorporated into this note.    Electronically signed by: Oh Salas M.D., 11/9/2021 9:29 PM

## 2021-11-10 NOTE — PROGRESS NOTES
Hospital Medicine Daily Progress Note    Date of Service  11/10/2021    Chief Complaint  Enrique Drake is a 46 y.o. male admitted 11/9/2021 with hematemesis    Hospital Course  46 yr old male with h/o alcoholic cirrhosis who presented with hematemesis. He was found to have a Hb of 8.7 and was given 2 units of PRBC, octreotide and IV vit K at an outlying facility    Interval Problem Update  Pts Hb dropped to 7.9. Continue to monitor, transfuse for less than 7. Plts low at 72, will transfuse platelets. Continue IV protonix and octreotide. GI has evaluated the patient wand will take him for EGD this afternoon. Continue IVF hydration.    I have personally seen and examined the patient at bedside. I discussed the plan of care with patient.    Consultants/Specialty  GI    Code Status  Full Code    Disposition  Patient is not medically cleared.   Anticipate discharge to to home with close outpatient follow-up.  I have placed the appropriate orders for post-discharge needs.    Review of Systems  Review of Systems   Constitutional: Negative for chills, diaphoresis and fever.   HENT: Negative for hearing loss and sore throat.    Eyes: Negative for blurred vision.   Respiratory: Negative for cough, sputum production, shortness of breath and wheezing.    Cardiovascular: Negative for chest pain, palpitations and leg swelling.   Gastrointestinal: Negative for abdominal pain, blood in stool, diarrhea, nausea and vomiting.        Hematmesis   Genitourinary: Negative for dysuria, flank pain and urgency.   Musculoskeletal: Negative for back pain, joint pain, myalgias and neck pain.   Skin: Negative for rash.   Neurological: Negative for dizziness, focal weakness, seizures and headaches.   Endo/Heme/Allergies: Does not bruise/bleed easily.   Psychiatric/Behavioral: Negative for suicidal ideas.   All other systems reviewed and are negative.       Physical Exam  Temp:  [35.9 °C (96.7 °F)-37.2 °C (98.9 °F)] 37.2 °C (98.9  °F)  Pulse:  [] 110  Resp:  [15-32] 20  BP: (118-139)/(64-76) 135/71  SpO2:  [90 %-96 %] 90 %    Physical Exam  Vitals and nursing note reviewed.   Constitutional:       General: He is not in acute distress.     Appearance: Normal appearance.   HENT:      Head: Normocephalic and atraumatic.      Nose: Nose normal.      Mouth/Throat:      Mouth: Mucous membranes are moist.   Eyes:      Extraocular Movements: Extraocular movements intact.      Conjunctiva/sclera: Conjunctivae normal.      Pupils: Pupils are equal, round, and reactive to light.   Cardiovascular:      Rate and Rhythm: Regular rhythm. Tachycardia present.      Pulses: Normal pulses.      Heart sounds: Normal heart sounds.   Pulmonary:      Effort: Pulmonary effort is normal. No respiratory distress.      Breath sounds: Normal breath sounds. No wheezing, rhonchi or rales.   Abdominal:      General: Bowel sounds are normal. There is no distension.      Palpations: Abdomen is soft.      Tenderness: There is no abdominal tenderness.   Musculoskeletal:         General: No swelling or tenderness. Normal range of motion.      Cervical back: Normal range of motion and neck supple.   Lymphadenopathy:      Cervical: No cervical adenopathy.   Skin:     General: Skin is warm.      Coloration: Skin is pale. Skin is not jaundiced.      Findings: No rash.   Neurological:      General: No focal deficit present.      Mental Status: He is alert and oriented to person, place, and time.      Cranial Nerves: No cranial nerve deficit.      Motor: No weakness.   Psychiatric:         Mood and Affect: Mood normal.         Behavior: Behavior normal.         Fluids    Intake/Output Summary (Last 24 hours) at 11/10/2021 1236  Last data filed at 11/10/2021 0020  Gross per 24 hour   Intake 28.75 ml   Output 450 ml   Net -421.25 ml       Laboratory  Recent Labs     11/09/21  2045 11/10/21  0650   WBC 9.2  --    RBC 4.12*  --    HEMOGLOBIN 10.2* 7.9*   HEMATOCRIT 34.0*  --    MCV  82.5  --    MCH 24.8*  --    MCHC 30.0*  --    RDW 57.8*  --    PLATELETCT 72*  --      Recent Labs     11/09/21 2045   SODIUM 137   POTASSIUM 5.6*   CHLORIDE 103   CO2 19*   GLUCOSE 376*   BUN 16   CREATININE 0.33*   CALCIUM 8.3*     Recent Labs     11/09/21 2045   APTT 40.2*   INR 1.93*               Imaging  No orders to display        Assessment/Plan  * Gastrointestinal hemorrhage with hematemesis- (present on admission)  Assessment & Plan  Has history of variceal banding in 2020 at [facility] and history of ulcers. Continue with Octreotide, gtt and protonix, gtt   Telemetry monitoring   NPO  COD  Monitor H/H every 8 hours   Transfuse < 7   Gi consulted, plan for EGD this afternoon      Thrombocytopenia (HCC)- (present on admission)  Assessment & Plan  Chronic likely secondary to alcohol liver disease.  Pt with active bleeding, will transfuse platelets to keep above > 100    Coagulopathy (HCC)- (present on admission)  Assessment & Plan  S/p FFP   Monitor INR     Tobacco abuse- (present on admission)  Assessment & Plan  Refuses nicotine patches. States he want to quit on his own.       Alcoholic cirrhosis of liver without ascites (HCC)- (present on admission)  Assessment & Plan  Patient had last alcohol drink on Thursday. Counseled on alcohol cessation.   Check Ammonia levels         Chronic alcohol abuse- (present on admission)  Assessment & Plan  Last alcohol drink last Thursday   Monitor for withdrawal        VTE prophylaxis: SCDs/TEDs    I have performed a physical exam and reviewed and updated ROS and Plan today (11/10/2021). In review of yesterday's note (11/9/2021), there are no changes except as documented above.

## 2021-11-10 NOTE — ED NOTES
Med rec updated and complete.  Allergies reviewed.  Pt is not currently taking any medications. Removed all medications from med rec.        [place] [facility] [phone]

## 2021-11-10 NOTE — ASSESSMENT & PLAN NOTE
Has history of variceal banding in 2020 at [facility] and history of ulcers. Continue with Octreotide, gtt and protonix, gtt   Telemetry monitoring   NPO  COD  Monitor H/H every 8 hours   Transfuse < 7   Gi consulted, plan for EGD this afternoon

## 2021-11-10 NOTE — ANESTHESIA POSTPROCEDURE EVALUATION
Patient: Enrique Drake    Procedure Summary     Date: 11/10/21 Room / Location: [facility] ROOM 26 / SURGERY SAME DAY [facility]    Anesthesia Start: 1303 Anesthesia Stop: 1325    Procedure: GASTROSCOPY (N/A Esophagus) Diagnosis: (ESOPHAGITIS/ PORTAL HYPERTENSIVE GASTROPATHY)    Surgeons: Fransico Lindquist M.D. Responsible Provider: Dayton Khanna M.D.    Anesthesia Type: MAC ASA Status: 3          Final Anesthesia Type: MAC  Last vitals  BP   Blood Pressure: 114/68    Temp   37 °C (98.6 °F)    Pulse   100   Resp   17    SpO2   100 %      Anesthesia Post Evaluation    Patient location during evaluation: PACU  Patient participation: complete - patient participated  Level of consciousness: awake  Pain score: 0    Airway patency: patent  Anesthetic complications: no  Cardiovascular status: hemodynamically stable  Respiratory status: acceptable  Hydration status: balanced    PONV: none          No complications documented.     Nurse Pain Score: 0 (NPRS)

## 2021-11-10 NOTE — ASSESSMENT & PLAN NOTE
Patient had last alcohol drink on Thursday. Counseled on alcohol cessation.   Check Ammonia levels

## 2021-11-10 NOTE — CONSULTS
Date of service: 11/10/2021    Attending Physician: Daryl Ashby M.D.    History of Present Illness: Enrique Drake is a 46 y.o. male here for GI bleeding.    46 year old male admitted over nite for hematemesis. He was transferred from [place].    Two years ago he underwent two endoscopies and found to have small esophageal varices and portal hypertensive gastropathy by Dr. Garcia. No band ligation was done.    Patient states he continues to consume alcohol but now will quit.    1. Hematemesis at home but no recurrence since admitted.  Stable vitals but for tachycardia ~ 100.   2. Alcoholic cirrhosis with last drink 4 days ago.  3. Alcoholic hepatitis with hyperbilirubinemia.  4. Thrombocytopenia.  5.  Retching this morning but no vomiting or hematemesis.  6. Admits to heartburn and taking daily acid blockers.    He denies cardiopulmonary problems but is a daily smoker and consumes cannabis.      Review of Systems:   Review of Systems   Constitutional: Positive for malaise/fatigue. Negative for fever.   HENT: Negative.    Respiratory: Negative for cough, hemoptysis and shortness of breath.    Cardiovascular: Negative for chest pain and leg swelling.   Gastrointestinal: Positive for heartburn, nausea and vomiting.   Genitourinary: Negative.    Musculoskeletal: Negative.    Skin: Negative for itching and rash.       Current Facility-Administered Medications   Medication Dose Route Frequency Provider Last Rate Last Admin   • ondansetron (ZOFRAN) syringe/vial injection 4 mg  4 mg Intravenous Q4HRS PRN Tiesha Campoverde M.D.   4 mg at 11/10/21 0420   • prochlorperazine (COMPAZINE) injection 5-10 mg  5-10 mg Intravenous Q6HRS PRN Tiesha Campoverde M.D.       • octreotide (SANDOSTATIN) 1,250 mcg in  mL Infusion  50 mcg/hr Intravenous Continuous Oh Salas M.D. 10 mL/hr at 11/10/21 0142 50 mcg/hr at 11/10/21 0142   • NS infusion   Intravenous Continuous Oh Salas M.D. 30 mL/hr at 11/10/21 0141  Rate Change not Required at 11/10/21 0141   • pantoprazole (PROTONIX) 80 mg in  mL Infusion  8 mg/hr Intravenous Continuous Daryl Ashby M.D. 25 mL/hr at 11/10/21 0142 8 mg/hr at 11/10/21 0142       Social History     Tobacco Use   • Smoking status: Current Every Day Smoker     Packs/day: 1.00     Years: 20.00     Pack years: 20.00     Types: Cigarettes     Last attempt to quit: 3/3/2013     Years since quittin.6   • Smokeless tobacco: Former User     Types: Chew     Quit date: 3/4/2012   • Tobacco comment: chewed for 10 years   Substance Use Topics   • Alcohol use: Not Currently     Alcohol/week: 21.0 oz     Types: 42 Cans of beer per week   • Drug use: Yes     Types: Marijuana     Comment: at night prn        No past medical history on file.   Cirrhosis  UGI Bleeding  Esophageal varices    No past surgical history on file.    Allergies: Patient has no known allergies.    Family History   Problem Relation Age of Onset   • Other Mother         smoker   • Lung Disease Mother         COPD   • Diabetes Father    • Other Brother         gout   • Heart Attack Maternal Grandfather    • Other Brother         epilepsy       Vitals:    11/10/21 0420   BP: 139/75   Pulse: (!) 104   Resp: 18   Temp: 36.7 °C (98.1 °F)   SpO2: 92%        Physical Examination:     Physical Exam  Constitutional:       General: He is in acute distress.      Appearance: He is ill-appearing. He is not toxic-appearing or diaphoretic.   HENT:      Head: Normocephalic and atraumatic.      Nose: Nose normal.   Eyes:      General: Scleral icterus present.      Conjunctiva/sclera: Conjunctivae normal.   Cardiovascular:      Rate and Rhythm: Tachycardia present.      Pulses: Normal pulses.   Pulmonary:      Effort: Pulmonary effort is normal. No respiratory distress.      Breath sounds: No stridor. No wheezing.   Abdominal:      General: There is distension.      Palpations: Abdomen is soft.      Tenderness: There is no abdominal tenderness.  There is no guarding or rebound.      Hernia: A hernia is present.      Comments: Possibly mild ascites  Umbilical hernia NOT incarcerated   Musculoskeletal:         General: Normal range of motion.      Cervical back: No rigidity or tenderness.   Skin:     General: Skin is warm.      Coloration: Skin is jaundiced.   Neurological:      General: No focal deficit present.      Mental Status: He is alert and oriented to person, place, and time.   Psychiatric:         Behavior: Behavior normal.         Thought Content: Thought content normal.           Lab Results   Component Value Date/Time    PROTHROMBTM 21.5 (H) 11/09/2021 08:45 PM    INR 1.93 (H) 11/09/2021 08:45 PM      Lab Results   Component Value Date/Time    WBC 9.2 11/09/2021 08:45 PM    RBC 4.12 (L) 11/09/2021 08:45 PM    HEMOGLOBIN 10.2 (L) 11/09/2021 08:45 PM    HEMATOCRIT 34.0 (L) 11/09/2021 08:45 PM    MCV 82.5 11/09/2021 08:45 PM    MCH 24.8 (L) 11/09/2021 08:45 PM    MCHC 30.0 (L) 11/09/2021 08:45 PM    MPV 10.2 11/09/2020 09:15 AM    NEUTSPOLYS 57.60 03/30/2021 05:00 AM    LYMPHOCYTES 24.10 03/30/2021 05:00 AM    MONOCYTES 12.90 03/30/2021 05:00 AM    EOSINOPHILS 3.90 03/30/2021 05:00 AM    BASOPHILS 1.10 03/30/2021 05:00 AM    HYPOCHROMIA 2+ (A) 03/30/2021 05:00 AM    ANISOCYTOSIS 1+ 03/30/2021 05:00 AM      Lab Results   Component Value Date/Time    SODIUM 137 11/09/2021 08:45 PM    POTASSIUM 5.6 (H) 11/09/2021 08:45 PM    CHLORIDE 103 11/09/2021 08:45 PM    CO2 19 (L) 11/09/2021 08:45 PM    GLUCOSE 376 (H) 11/09/2021 08:45 PM    BUN 16 11/09/2021 08:45 PM    CREATININE 0.33 (L) 11/09/2021 08:45 PM      Recent Labs     11/09/21  2045 11/10/21  0003   ASTSGOT 57*  --    ALTSGPT 35  --    TBILIRUBIN 7.7*  --    ALKPHOSPHAT 192*  --    GLOBULIN 2.9  --    INR 1.93*  --    AMMONIA  --  51*       Imaging:   No results found.          Assessment and Plan:    1.  Hematemesis at home but no recurrence since admitted.  Stable vitals but for tachycardia ~ 100.    2.  Alcoholic cirrhosis with last drink 4 days ago.  3.  Alcoholic hepatitis with hyperbilirubinemia.  4. Thrombocytopenia.  5.  Retching this morning but no vomiting or hematemesis.  6.  Admits to heartburn and taking daily acid blockers.  7.  INR ~ 1.9  8. Mother had colon cancer age unknown apparently.      PLAN:  NPO for EGD today  Octeotride and IV PPI  IV iron, after transfusions his iron studies are likely not reliable but MCV was very low before and still is low after transfusions. Needs IV iron and unlikely to absorb PO iron.  Obtain hep C AB, Hep B core total AB and Hep Bs AG and Hep Bs AB, Hep A total AB  Obtain US to evaluate for ascites and paracentesis if present (diagnostic)  Start ceftriaxone.  Outpatient colonoscopy with family history of colon ca in mother  Outpatient  [facility] with Dr. Garcia in 1 month.

## 2021-11-10 NOTE — DISCHARGE PLANNING
Care Transition Team Assessment    Spoke with patient at bedside and verified all information. Lives with spouse and 3 children in single story house. PCP Jazmyn Boggs. Has [facility] insurance with [employer]. Uses [facility] in [place] for meds. Anticipate spouse will be ride @ D/C. Anticipate no needs @ present time.    Information Source  Orientation Level: Oriented X4  Information Given By: Patient    Readmission Evaluation  Is this a readmission?: No    Interdisciplinary Discharge Planning  Primary Care Physician: Jazmyn Boggs  Lives with - Patient's Self Care Capacity: Spouse,Child Less than 18 Years of Age,Adult Children  Support Systems: Children,Spouse / Significant Other  Housing / Facility: [address]  Do You Take your Prescribed Medications Regularly: No  Reasons Why Not Taking Medications : Financial Reasons  Able to Return to Previous ADL's: Yes  Mobility Issues: No  Prior Services: Home-Independent  Patient Prefers to be Discharged to:: Home  Assistance Needed: No  Durable Medical Equipment: Not Applicable    Discharge Preparedness  What are your discharge supports?: Child,Spouse  Prior Functional Level: Ambulatory    Functional Assesment  Prior Functional Level: Ambulatory    Finances  Prescription Coverage: Yes    Anticipated Discharge Information  Discharge Disposition: Discharged to home/self care (01)  Discharge Address: [facility] [place] [address]  Discharge Contact Phone Number: [phone]

## 2021-11-10 NOTE — ED TRIAGE NOTES
Chief Complaint   Patient presents with   • Blood in Vomit     Patient BIB air flight for blood in vomit. Patient has a known history of esophageal varices. Patient presented to [facility] where they gave 2 units of unmatched blood, vitamin K, and placed the patient on an octreotide gtt. patient arrives GCS 15 and ambulated to [facility] with steady gait.

## 2021-11-10 NOTE — PROGRESS NOTES
Pt returned to unit with PACU RN. Pt alert, denies pain at this time. Post-op vitals being performed by CNA

## 2021-11-10 NOTE — CARE PLAN
The patient is Watcher - Medium risk of patient condition declining or worsening    Shift Goals  Clinical Goals: Trend Hgb / gi consult  Patient Goals: comfort  Family Goals: not present      Problem: Knowledge Deficit - Standard  Goal: Patient and family/care givers will demonstrate understanding of plan of care, disease process/condition, diagnostic tests and medications  Outcome: Progressing

## 2021-11-10 NOTE — ASSESSMENT & PLAN NOTE
Chronic likely secondary to alcohol liver disease.  Pt with active bleeding, will transfuse platelets to keep above > 100

## 2021-11-10 NOTE — H&P
Hospital Medicine History & Physical Note    Date of Service  11/9/2021    Primary Care Physician  BRONWYN Frausto.    Consultants  GI    Specialist Names: Dr. Don     Code Status  Full Code    Chief Complaint  Chief Complaint   Patient presents with   • Blood in Vomit       History of Presenting Illness  Enrique Drake is a 46 y.o. male with past medical history of Alcoholic Cirrhosis, Variceal GI bleed s/p endoscopy in 2020 ([facility]), coagulopathy  who presented 11/9/2021 with bloody vomiting at [facility] [place]. He reports earlier today he had 3 episodes of bright red blood around 1 pm. He denies being on blood thinners or NSAID abuse. He denies any chest pain, shortness of breath, nausea or vomiting. His hemoglobin at OSH was 8.7 given 2 units of unmatched PRBC's, octreotide and vitamin K. Repeat hemoglobin at [facility] improved to 10.2 and was given FFP for his coagulopathy. At this time he is currently hemodynamically stable with SBP's ranging from 120-130s with HR ranging from 100-115. Gi was consulted and well see patient in the morning unless patient decompensates overnight.        I discussed the plan of care with patient.    Review of Systems  Review of Systems   Constitutional: Negative for chills and fever.   HENT: Negative for hearing loss and tinnitus.    Eyes: Negative for blurred vision and double vision.   Respiratory: Negative for cough and hemoptysis.    Cardiovascular: Negative for chest pain and palpitations.   Gastrointestinal: Positive for abdominal pain, nausea and vomiting. Negative for heartburn.   Genitourinary: Negative for dysuria and urgency.   Musculoskeletal: Negative for myalgias and neck pain.   Skin: Negative for itching and rash.   Neurological: Negative for dizziness and headaches.   Endo/Heme/Allergies: Does not bruise/bleed easily.   Psychiatric/Behavioral: Negative for depression and suicidal ideas.       Past Medical History  Alcoholic Cirrhosis, Variceal GI  bleed s/p endoscopy in 2020 ([facility]),      Surgical History  Reviewed and not pertinent     Family History  family history includes Diabetes in his father; Heart Attack in his maternal grandfather; Lung Disease in his mother; Other in his brother, brother, and mother.   Family history reviewed with patient. There is no family history that is pertinent to the chief complaint.     Social History   reports that he has been smoking cigarettes. He has a 20.00 pack-year smoking history. He quit smokeless tobacco use about 9 years ago.  His smokeless tobacco use included chew. He reports previous alcohol use of about 21.0 oz of alcohol per week. He reports current drug use. Drug: Marijuana.    Allergies  No Known Allergies    Medications  None       Physical Exam  Temp:  [35.9 °C (96.7 °F)-36.1 °C (97 °F)] 36.1 °C (97 °F)  Pulse:  [108-118] 118  Resp:  [15-18] 18  BP: (118-136)/(64-76) 136/76  SpO2:  [93 %-96 %] 93 %  Blood Pressure: 136/76   Temperature: 36.1 °C (97 °F)   Pulse: (!) 118   Respiration: 18   Pulse Oximetry: 93 %       Physical Exam  Vitals and nursing note reviewed.   Constitutional:       Appearance: Normal appearance.   HENT:      Head: Normocephalic and atraumatic.      Right Ear: Tympanic membrane normal.      Left Ear: Tympanic membrane normal.      Nose: Nose normal.      Mouth/Throat:      Mouth: Mucous membranes are moist.      Pharynx: Oropharynx is clear.   Eyes:      General: Scleral icterus present.      Extraocular Movements: Extraocular movements intact.      Pupils: Pupils are equal, round, and reactive to light.   Cardiovascular:      Rate and Rhythm: Normal rate and regular rhythm.      Pulses: Normal pulses.      Heart sounds: Normal heart sounds.   Pulmonary:      Effort: Pulmonary effort is normal. No respiratory distress.      Breath sounds: Normal breath sounds. No stridor. No wheezing or rhonchi.   Abdominal:      General: Bowel sounds are normal. There is no distension.       Palpations: Abdomen is soft. There is no mass.      Tenderness: There is abdominal tenderness (mild TTP).   Musculoskeletal:         General: No swelling, tenderness, deformity or signs of injury. Normal range of motion.      Cervical back: Neck supple.   Skin:     General: Skin is warm.      Capillary Refill: Capillary refill takes less than 2 seconds.      Coloration: Skin is jaundiced. Skin is not pale.      Findings: No erythema.   Neurological:      General: No focal deficit present.      Mental Status: He is alert and oriented to person, place, and time. Mental status is at baseline.      Cranial Nerves: No cranial nerve deficit.      Sensory: No sensory deficit.      Motor: No weakness.      Coordination: Coordination normal.   Psychiatric:         Mood and Affect: Mood normal.         Behavior: Behavior normal.         Laboratory:  Recent Labs     11/09/21 2045   WBC 9.2   RBC 4.12*   HEMOGLOBIN 10.2*   HEMATOCRIT 34.0*   MCV 82.5   MCH 24.8*   MCHC 30.0*   RDW 57.8*   PLATELETCT 72*     Recent Labs     11/09/21 2045   SODIUM 137   POTASSIUM 5.6*   CHLORIDE 103   CO2 19*   GLUCOSE 376*   BUN 16   CREATININE 0.33*   CALCIUM 8.3*     Recent Labs     11/09/21 2045   ALTSGPT 35   ASTSGOT 57*   ALKPHOSPHAT 192*   TBILIRUBIN 7.7*   LIPASE 35   GLUCOSE 376*     Recent Labs     11/09/21 2045   APTT 40.2*   INR 1.93*         Imaging:    no X-Ray or EKG requiring interpretation    Assessment/Plan:  I anticipate this patient is appropriate for observation status at this time.    * Gastrointestinal hemorrhage with hematemesis- (present on admission)  Assessment & Plan  Has history of variceal banding in 2020 at [facility] and history of ulcers. Continue with Octreotide, gtt and protonix, gtt   Telemetry monitoring   NPO  COD  Monitor H/H every 8 hours   Transfuse < 7   Gi consulted will evaluate patient in am       Thrombocytopenia (HCC)  Assessment & Plan  Chronic likely secondary to alcohol liver disease.  Monitor level on arrival 72.     Coagulopathy (HCC)- (present on admission)  Assessment & Plan  S/p FFP   Monitor INR     Tobacco abuse- (present on admission)  Assessment & Plan  Refuses nicotine patches. States he want to quit on his own.       Alcoholic cirrhosis of liver without ascites (HCC)- (present on admission)  Assessment & Plan  Patient had last alcohol drink on Thursday. Counseled on alcohol cessation.   Check Ammonia levels         Chronic alcohol abuse- (present on admission)  Assessment & Plan  Last alcohol drink last Thursday   Monitor for withdrawal       VTE prophylaxis: pharmacologic prophylaxis contraindicated due to active hemetamesis

## 2021-11-10 NOTE — OR SURGEON
Post OP Note    PreOp Diagnosis: hematemesis      PostOp Diagnosis:    1/ distal esophagitis grade LA classification 2   2/ portal gastropathy throughout   3/ otherwise normal EGD to the second portion of the duodenum      Procedure(s):  GASTROSCOPY - Wound Class: Clean Contaminated    Surgeon(s):  Fransico Lindquist M.D.    Anesthesiologist/Type of Anesthesia:  Anesthesiologist: Dayton Khanna M.D./General    Surgical Staff:  Endoscopy Technician: Jacquelyn Valentine; Anthony Owens  Endoscopy Nurse: Arabella Gleason R.N.; Don Subramanian RCHE    Specimens removed if any:  * No specimens in log *    Estimated Blood Loss: none    Findings: Prior to the procedure the patient was informed the risks and benefits of the procedure and freely signed the consent form.  He was monitored standard monitoring blood pressure oxygen heart monitor no appreciable abnormalities noted during the procedure.  Once adequate sedation was achieved he was placed in left lateral cubitus position bite-block was placed.  Introduction of the standard Olympus gastric was performed under direct visualization through the oropharynx which appeared normal.  Intubation esophagus achieved easily in the upper midportion esophagus appeared normal.  The distal portion of the esophagus there is grade LA classification 2 distal esophagitis, GE junction appreciate 38 cm.  Intubation the gastric cavity allowed good insufflation for a panoramic view the entire gastric mucosa which is consistent with portal gastropathy.  Antrum and pylorus appeared normal.  Intubation the pylorus and appreciation of duodenal bulb second third portion of the duodenum and no appreciable mucosal abnormalities were noted.  Extubation the pylorus lab retroflexion performed the antrum throughout the incisura body fundus cardia and again extensive portal gastropathy was appreciated but no other mucosal abnormalities noted.  The mucosa was very friable and oozes with only gildardo  contact.  The gastroscope was straightened excess air was removed the patient tolerated the procedure well.    Complications: none      Recommendation    Omeprazole 40 mg twice a day  Carafate slurry 3 times a day  Follow-up at [facility] for further evaluation outpatient colonoscopy  Recommend abdominal ultrasound alpha-fetoprotein every 6 months  Advance to a hepatic diet  Avoidance of further alcohol abuse.  Watch for DTs  Refer to previous consult note with regards to recommendations.  Iron supplementation    If there are any further questions or concerns please feel free to give us call at [phone].  11/10/2021 1:23 PM Fransico Lindquist M.D.

## 2021-11-10 NOTE — ANESTHESIA TIME REPORT
Anesthesia Start and Stop Event Times     Date Time Event    11/10/2021 1256 Ready for Procedure     1303 Anesthesia Start     1325 Anesthesia Stop        Responsible Staff  11/10/21    Name Role Begin End    Dayton Khanna M.D. Anesth 1303 1325        Preop Diagnosis (Free Text):  Pre-op Diagnosis     Hematemesis        Preop Diagnosis (Codes):    Premium Reason  Non-Premium    Comments:

## 2021-11-11 ENCOUNTER — APPOINTMENT (OUTPATIENT)
Dept: RADIOLOGY | Facility: MEDICAL CENTER | Age: 46
DRG: 433 | End: 2021-11-11
Attending: INTERNAL MEDICINE
Payer: COMMERCIAL

## 2021-11-11 ENCOUNTER — PATIENT OUTREACH (OUTPATIENT)
Dept: HEALTH INFORMATION MANAGEMENT | Facility: OTHER | Age: 46
End: 2021-11-11

## 2021-11-11 VITALS
HEART RATE: 100 BPM | TEMPERATURE: 98 F | DIASTOLIC BLOOD PRESSURE: 70 MMHG | HEIGHT: 72 IN | RESPIRATION RATE: 16 BRPM | WEIGHT: 220 LBS | OXYGEN SATURATION: 96 % | BODY MASS INDEX: 29.8 KG/M2 | SYSTOLIC BLOOD PRESSURE: 133 MMHG

## 2021-11-11 PROBLEM — D68.9 COAGULOPATHY (HCC): Status: RESOLVED | Noted: 2021-03-27 | Resolved: 2021-11-11

## 2021-11-11 PROBLEM — K92.0 GASTROINTESTINAL HEMORRHAGE WITH HEMATEMESIS: Status: RESOLVED | Noted: 2020-06-15 | Resolved: 2021-11-11

## 2021-11-11 LAB
ALBUMIN SERPL BCP-MCNC: 3.1 G/DL (ref 3.2–4.9)
ANION GAP SERPL CALC-SCNC: 11 MMOL/L (ref 7–16)
BASOPHILS # BLD AUTO: 1.2 % (ref 0–1.8)
BASOPHILS # BLD AUTO: 1.6 % (ref 0–1.8)
BASOPHILS # BLD: 0.08 K/UL (ref 0–0.12)
BASOPHILS # BLD: 0.09 K/UL (ref 0–0.12)
BILIRUB CONJ SERPL-MCNC: 3.7 MG/DL (ref 0.1–0.5)
BILIRUB INDIRECT SERPL-MCNC: 2.4 MG/DL (ref 0–1)
BILIRUB SERPL-MCNC: 6.1 MG/DL (ref 0.1–1.5)
BUN SERPL-MCNC: 14 MG/DL (ref 8–22)
CALCIUM SERPL-MCNC: 7.5 MG/DL (ref 8.5–10.5)
CHLORIDE SERPL-SCNC: 101 MMOL/L (ref 96–112)
CO2 SERPL-SCNC: 22 MMOL/L (ref 20–33)
CREAT SERPL-MCNC: 0.31 MG/DL (ref 0.5–1.4)
EOSINOPHIL # BLD AUTO: 0.31 K/UL (ref 0–0.51)
EOSINOPHIL # BLD AUTO: 0.47 K/UL (ref 0–0.51)
EOSINOPHIL NFR BLD: 6 % (ref 0–6.9)
EOSINOPHIL NFR BLD: 6.3 % (ref 0–6.9)
ERYTHROCYTE [DISTWIDTH] IN BLOOD BY AUTOMATED COUNT: 56.4 FL (ref 35.9–50)
ERYTHROCYTE [DISTWIDTH] IN BLOOD BY AUTOMATED COUNT: 57 FL (ref 35.9–50)
EST. AVERAGE GLUCOSE BLD GHB EST-MCNC: 157 MG/DL
GLUCOSE SERPL-MCNC: 278 MG/DL (ref 65–99)
HBA1C MFR BLD: 7.1 % (ref 4–5.6)
HCT VFR BLD AUTO: 25.4 % (ref 42–52)
HCT VFR BLD AUTO: 26.9 % (ref 42–52)
HGB BLD-MCNC: 7.4 G/DL (ref 14–18)
HGB BLD-MCNC: 8.4 G/DL (ref 14–18)
IMM GRANULOCYTES # BLD AUTO: 0.02 K/UL (ref 0–0.11)
IMM GRANULOCYTES # BLD AUTO: 0.04 K/UL (ref 0–0.11)
IMM GRANULOCYTES NFR BLD AUTO: 0.4 % (ref 0–0.9)
IMM GRANULOCYTES NFR BLD AUTO: 0.5 % (ref 0–0.9)
LYMPHOCYTES # BLD AUTO: 0.93 K/UL (ref 1–4.8)
LYMPHOCYTES # BLD AUTO: 1.25 K/UL (ref 1–4.8)
LYMPHOCYTES NFR BLD: 16.8 % (ref 22–41)
LYMPHOCYTES NFR BLD: 18.1 % (ref 22–41)
MCH RBC QN AUTO: 24.1 PG (ref 27–33)
MCH RBC QN AUTO: 25.3 PG (ref 27–33)
MCHC RBC AUTO-ENTMCNC: 29.1 G/DL (ref 33.7–35.3)
MCHC RBC AUTO-ENTMCNC: 31.2 G/DL (ref 33.7–35.3)
MCV RBC AUTO: 81 FL (ref 81.4–97.8)
MCV RBC AUTO: 82.7 FL (ref 81.4–97.8)
MONOCYTES # BLD AUTO: 0.62 K/UL (ref 0–0.85)
MONOCYTES # BLD AUTO: 0.92 K/UL (ref 0–0.85)
MONOCYTES NFR BLD AUTO: 12.1 % (ref 0–13.4)
MONOCYTES NFR BLD AUTO: 12.4 % (ref 0–13.4)
MORPHOLOGY BLD-IMP: NORMAL
NEUTROPHILS # BLD AUTO: 3.17 K/UL (ref 1.82–7.42)
NEUTROPHILS # BLD AUTO: 4.67 K/UL (ref 1.82–7.42)
NEUTROPHILS NFR BLD: 61.8 % (ref 44–72)
NEUTROPHILS NFR BLD: 62.8 % (ref 44–72)
NRBC # BLD AUTO: 0 K/UL
NRBC # BLD AUTO: 0 K/UL
NRBC BLD-RTO: 0 /100 WBC
NRBC BLD-RTO: 0 /100 WBC
PLATELET # BLD AUTO: 77 K/UL (ref 164–446)
PLATELET # BLD AUTO: 89 K/UL (ref 164–446)
PMV BLD AUTO: 10.4 FL (ref 9–12.9)
POTASSIUM SERPL-SCNC: 3.5 MMOL/L (ref 3.6–5.5)
RBC # BLD AUTO: 3.07 M/UL (ref 4.7–6.1)
RBC # BLD AUTO: 3.32 M/UL (ref 4.7–6.1)
SODIUM SERPL-SCNC: 134 MMOL/L (ref 135–145)
WBC # BLD AUTO: 5.1 K/UL (ref 4.8–10.8)
WBC # BLD AUTO: 7.4 K/UL (ref 4.8–10.8)

## 2021-11-11 PROCEDURE — 700111 HCHG RX REV CODE 636 W/ 250 OVERRIDE (IP): Performed by: INTERNAL MEDICINE

## 2021-11-11 PROCEDURE — A9270 NON-COVERED ITEM OR SERVICE: HCPCS | Performed by: INTERNAL MEDICINE

## 2021-11-11 PROCEDURE — 700102 HCHG RX REV CODE 250 W/ 637 OVERRIDE(OP): Performed by: INTERNAL MEDICINE

## 2021-11-11 PROCEDURE — 80048 BASIC METABOLIC PNL TOTAL CA: CPT

## 2021-11-11 PROCEDURE — 99239 HOSP IP/OBS DSCHRG MGMT >30: CPT | Performed by: INTERNAL MEDICINE

## 2021-11-11 PROCEDURE — 82247 BILIRUBIN TOTAL: CPT

## 2021-11-11 PROCEDURE — 76705 ECHO EXAM OF ABDOMEN: CPT

## 2021-11-11 PROCEDURE — 83036 HEMOGLOBIN GLYCOSYLATED A1C: CPT

## 2021-11-11 PROCEDURE — 700105 HCHG RX REV CODE 258: Performed by: INTERNAL MEDICINE

## 2021-11-11 PROCEDURE — 82040 ASSAY OF SERUM ALBUMIN: CPT

## 2021-11-11 PROCEDURE — 85025 COMPLETE CBC W/AUTO DIFF WBC: CPT

## 2021-11-11 PROCEDURE — 82248 BILIRUBIN DIRECT: CPT

## 2021-11-11 RX ORDER — METHYLPREDNISOLONE 4 MG/1
TABLET ORAL
Qty: 21 TABLET | Refills: 0 | Status: ON HOLD | OUTPATIENT
Start: 2021-12-09 | End: 2021-11-23

## 2021-11-11 RX ORDER — POTASSIUM CHLORIDE 20 MEQ/1
40 TABLET, EXTENDED RELEASE ORAL ONCE
Status: COMPLETED | OUTPATIENT
Start: 2021-11-11 | End: 2021-11-11

## 2021-11-11 RX ORDER — OMEPRAZOLE 20 MG/1
20 CAPSULE, DELAYED RELEASE ORAL 2 TIMES DAILY
Qty: 60 CAPSULE | Refills: 0 | Status: SHIPPED | OUTPATIENT
Start: 2021-11-11

## 2021-11-11 RX ORDER — CALCIUM GLUCONATE 20 MG/ML
2 INJECTION, SOLUTION INTRAVENOUS ONCE
Status: COMPLETED | OUTPATIENT
Start: 2021-11-11 | End: 2021-11-11

## 2021-11-11 RX ORDER — SUCRALFATE ORAL 1 G/10ML
1 SUSPENSION ORAL EVERY 8 HOURS
Qty: 414 ML | Refills: 0 | Status: SHIPPED | OUTPATIENT
Start: 2021-11-11

## 2021-11-11 RX ADMIN — CEFTRIAXONE SODIUM 2 G: 2 INJECTION, POWDER, FOR SOLUTION INTRAMUSCULAR; INTRAVENOUS at 06:24

## 2021-11-11 RX ADMIN — PREDNISOLONE 39.9 MG: 15 SOLUTION ORAL at 11:55

## 2021-11-11 RX ADMIN — SUCRALFATE 1 G: 1 SUSPENSION ORAL at 08:54

## 2021-11-11 RX ADMIN — POTASSIUM CHLORIDE 40 MEQ: 1500 TABLET, EXTENDED RELEASE ORAL at 11:55

## 2021-11-11 RX ADMIN — OMEPRAZOLE 20 MG: 20 CAPSULE, DELAYED RELEASE ORAL at 06:23

## 2021-11-11 RX ADMIN — CALCIUM GLUCONATE 2 G: 20 INJECTION, SOLUTION INTRAVENOUS at 11:55

## 2021-11-11 ASSESSMENT — PAIN DESCRIPTION - PAIN TYPE: TYPE: ACUTE PAIN

## 2021-11-11 NOTE — DISCHARGE INSTRUCTIONS
Discharge Instructions    Discharged to home by car with relative. Discharged via walking, hospital escort: Yes.  Special equipment needed: Not Applicable    Be sure to schedule a follow-up appointment with your primary care doctor or any specialists as instructed.     Discharge Plan:   Diet Plan: Discussed  Activity Level: Discussed  Confirmed Follow up Appointment: Patient to Call and Schedule Appointment  Confirmed Symptoms Management: Discussed  Medication Reconciliation Updated: Yes  Influenza Vaccine Indication: Patient Refuses    I understand that a diet low in cholesterol, fat, and sodium is recommended for good health. Unless I have been given specific instructions below for another diet, I accept this instruction as my diet prescription.   Other diet: low fat    Special Instructions: None    · Is patient discharged on Warfarin / Coumadin?   No     Depression / Suicide Risk    As you are discharged from this [facility] facility, it is important to learn how to keep safe from harming yourself.    Recognize the warning signs:  · Abrupt changes in personality, positive or negative- including increase in energy   · Giving away possessions  · Change in eating patterns- significant weight changes-  positive or negative  · Change in sleeping patterns- unable to sleep or sleeping all the time   · Unwillingness or inability to communicate  · Depression  · Unusual sadness, discouragement and loneliness  · Talk of wanting to die  · Neglect of personal appearance   · Rebelliousness- reckless behavior  · Withdrawal from people/activities they love  · Confusion- inability to concentrate     If you or a loved one observes any of these behaviors or has concerns about self-harm, here's what you can do:  · Talk about it- your feelings and reasons for harming yourself  · Remove any means that you might use to hurt yourself (examples: pills, rope, extension cords, firearm)  · Get professional help from the community (Mental  Health, Substance Abuse, psychological counseling)  · Do not be alone:Call your Safe Contact- someone whom you trust who will be there for you.  · Call your local CRISIS HOTLINE 547-4706 or 104-404-5876  · Call your local Children's Mobile Crisis Response Team [place] (585) 668-2309 or www.[employer]  · Call the toll free National Suicide Prevention Hotlines   · National Suicide Prevention Lifeline 555-017-HLAE (0660)  · National Hope Line Network 800-SUICIDE (562-5174)

## 2021-11-11 NOTE — CARE PLAN
Problem: Knowledge Deficit - Standard  Goal: Patient and family/care givers will demonstrate understanding of plan of care, disease process/condition, diagnostic tests and medications  Outcome: Progressing   The patient is Stable - Low risk of patient condition declining or worsening    Shift Goals  Clinical Goals: Monitor labs  Patient Goals: comfort  Family Goals: not present    Progress made toward(s) clinical / shift goals:  discussed poc check labs patient appears comfortable     Patient is not progressing towards the following goals:

## 2021-11-11 NOTE — CARE PLAN
The patient is Watcher - Medium risk of patient condition declining or worsening    Shift Goals  Clinical Goals: Monitor labs  Patient Goals: comfort  Family Goals: not present      Problem: Knowledge Deficit - Standard  Goal: Patient and family/care givers will demonstrate understanding of plan of care, disease process/condition, diagnostic tests and medications  Outcome: Progressing

## 2021-11-11 NOTE — CARE PLAN
The patient is Stable - Low risk of patient condition declining or worsening    Shift Goals  Clinical Goals: Trend Hgb, EGD  Patient Goals: Rest  Family Goals: not present    Progress made toward(s) clinical / shift goals: Hgb trending down but stable at this time, EGD completed    Patient is not progressing towards the following goals:      Problem: Knowledge Deficit - Standard  Goal: Patient and family/care givers will demonstrate understanding of plan of care, disease process/condition, diagnostic tests and medications  Outcome: Progressing

## 2021-11-11 NOTE — DISCHARGE SUMMARY
Discharge Summary    CHIEF COMPLAINT ON ADMISSION  Chief Complaint   Patient presents with   • Blood in Vomit       Reason for Admission  EMS     Admission Date  11/9/2021    CODE STATUS  Full Code    HPI & HOSPITAL COURSE  Enrique Drake is a 46 y.o. male with past medical history of Alcoholic Cirrhosis, Variceal GI bleed s/p endoscopy in 2020 ([facility]), coagulopathy, thrombocytopenia, ongoing alcohol abuse  who presented 11/9/2021 with bloody vomiting at [facility]. His hemoglobin at OSH was 8.7 given 2 units of unmatched PRBC's, octreotide and vitamin K. Repeat hemoglobin at [facility] improved to 10.2 and then down to 7.9 the next day. He was also given platelets for thrombocytopenia of 72 and was given FFP for his coagulopathy with an INR of 1.9. Pt was started on IV protonix, IV octreotide and IV ceftriaxone. GI was consulted and he underwent EGD that revealed distal esophagitis grade LA classification 2, portal gastropathy throughout and otherwise normal EGD to the second portion of the duodenum. Pt was noted to have alcohol hepatitis with bilirubin of 7.7, INR 1.9 and a discriminant score of 51.4 and was started on prednisolone 40 mg daily for 28 days followed by a taper. He was instructed to quit drinking alcohol and follow up with his PCP and GI in clinic. He was also diagnosed with diabetes mellitus with a HbA1c of 7 and was started on metformin.    Therefore, he is discharged in fair and stable condition to home with close outpatient follow-up.    The patient met 2-midnight criteria for an inpatient stay at the time of discharge.    Discharge Date  11/11/21    FOLLOW UP ITEMS POST DISCHARGE  Follow up with GI and pcp    DISCHARGE DIAGNOSES  Principal Problem (Resolved):    Gastrointestinal hemorrhage with hematemesis POA: Yes  Active Problems:    Alcoholic cirrhosis of liver without ascites (HCC) POA: Yes    Tobacco abuse POA: Yes    Thrombocytopenia (HCC) POA: Yes  Resolved Problems:     Chronic alcohol abuse POA: Yes    Coagulopathy (HCC) POA: Yes      FOLLOW UP    MARIAMA Frausto  [address] [facility] [id number] [phone]    Schedule an appointment as soon as possible for a visit in 1 week      Fransico Lindquist M.D.  [address] [place] [address]  [phone]    Schedule an appointment as soon as possible for a visit in 2 days        MEDICATIONS ON DISCHARGE     Medication List      START taking these medications      Instructions   metFORMIN 500 MG Tabs  Commonly known as: GLUCOPHAGE   Take 1 Tablet by mouth 2 times a day with meals.  Dose: 500 mg     methylPREDNISolone 4 MG Tbpk  Start taking on: December 9, 2021  Commonly known as: MEDROL DOSEPAK   Follow schedule on package instructions.     omeprazole 20 MG delayed-release capsule  Commonly known as: PRILOSEC   Take 1 Capsule by mouth 2 times a day.  Dose: 20 mg     sucralfate 1 GM/10ML Susp  Commonly known as: CARAFATE   Take 10 mL by mouth every 8 hours.  Dose: 1 g            Allergies  No Known Allergies    DIET  Orders Placed This Encounter   Procedures   • Diet Order Diet: Low Fiber(GI Soft)     Standing Status:   Standing     Number of Occurrences:   1     Order Specific Question:   Diet:     Answer:   Low Fiber(GI Soft) [2]       ACTIVITY  As tolerated.  Weight bearing as tolerated    CONSULTATIONS  GI Dr Lindquist    PROCEDURES  EGD    LABORATORY  Lab Results   Component Value Date    SODIUM 134 (L) 11/11/2021    POTASSIUM 3.5 (L) 11/11/2021    CHLORIDE 101 11/11/2021    CO2 22 11/11/2021    GLUCOSE 278 (H) 11/11/2021    BUN 14 11/11/2021    CREATININE 0.31 (L) 11/11/2021        Lab Results   Component Value Date    WBC 7.4 11/11/2021    HEMOGLOBIN 8.4 (L) 11/11/2021    HEMATOCRIT 26.9 (L) 11/11/2021    PLATELETCT 89 (L) 11/11/2021        Total time of the discharge process exceeds 40 minutes.

## 2021-11-11 NOTE — PROGRESS NOTES
Received report from NOC Rn assumed care of patient. Assessment done Covid 19 Surge charting in place. Call light within reach bed in low position. Octide drip infusing no difficulty

## 2021-11-12 ENCOUNTER — APPOINTMENT (OUTPATIENT)
Dept: URGENT CARE | Facility: PHYSICIAN GROUP | Age: 46
End: 2021-11-12

## 2021-11-12 DIAGNOSIS — Z02.1 PRE-EMPLOYMENT DRUG SCREENING: ICD-10-CM

## 2021-11-12 LAB
AMP AMPHETAMINE: NORMAL
COC COCAINE: NORMAL
HAV AB SER QL IA: NEGATIVE
INT CON NEG: NORMAL
INT CON POS: NORMAL
MET METHAMPHETAMINES: NORMAL
OPI OPIATES: NORMAL
PCP PHENCYCLIDINE: NORMAL
POC DRUG COMMENT 753798-OCCUPATIONAL HEALTH: NORMAL
THC: NORMAL

## 2021-11-12 NOTE — PROGRESS NOTES
IV AND TELE DCED INSTRUCTIONS GIVEN ALL QUESTIONS WAS ANSWERED  TO LOBBY WITH STAFF  VIA W/C CONDITION STABLE

## 2021-11-22 ENCOUNTER — APPOINTMENT (OUTPATIENT)
Dept: RADIOLOGY | Facility: MEDICAL CENTER | Age: 46
DRG: 638 | End: 2021-11-22
Attending: EMERGENCY MEDICINE
Payer: COMMERCIAL

## 2021-11-22 ENCOUNTER — HOSPITAL ENCOUNTER (INPATIENT)
Facility: MEDICAL CENTER | Age: 46
LOS: 1 days | DRG: 638 | End: 2021-11-23
Attending: EMERGENCY MEDICINE | Admitting: PSYCHIATRY & NEUROLOGY
Payer: COMMERCIAL

## 2021-11-22 DIAGNOSIS — E11.10 DIABETIC KETOACIDOSIS WITHOUT COMA ASSOCIATED WITH TYPE 2 DIABETES MELLITUS (HCC): ICD-10-CM

## 2021-11-22 DIAGNOSIS — E87.29 HIGH ANION GAP METABOLIC ACIDOSIS: ICD-10-CM

## 2021-11-22 DIAGNOSIS — E11.9 DIABETES MELLITUS, NEW ONSET (HCC): ICD-10-CM

## 2021-11-22 DIAGNOSIS — K70.30 ALCOHOLIC CIRRHOSIS OF LIVER WITHOUT ASCITES (HCC): ICD-10-CM

## 2021-11-22 DIAGNOSIS — D68.9 COAGULOPATHY (HCC): ICD-10-CM

## 2021-11-22 DIAGNOSIS — E87.20 LACTIC ACIDOSIS: ICD-10-CM

## 2021-11-22 DIAGNOSIS — E86.0 DEHYDRATION: ICD-10-CM

## 2021-11-22 DIAGNOSIS — D69.6 THROMBOCYTOPENIA (HCC): ICD-10-CM

## 2021-11-22 DIAGNOSIS — E87.1 HYPONATREMIA: ICD-10-CM

## 2021-11-22 PROBLEM — Z72.0 TOBACCO ABUSE: Status: RESOLVED | Noted: 2020-06-15 | Resolved: 2021-11-22

## 2021-11-22 PROBLEM — R79.89 LACTATE BLOOD INCREASE: Status: ACTIVE | Noted: 2021-11-22

## 2021-11-22 LAB
ALBUMIN SERPL BCP-MCNC: 3.3 G/DL (ref 3.2–4.9)
ALBUMIN SERPL BCP-MCNC: 4 G/DL (ref 3.2–4.9)
ALBUMIN/GLOB SERPL: 1.3 G/DL
ALBUMIN/GLOB SERPL: 1.3 G/DL
ALP SERPL-CCNC: 318 U/L (ref 30–99)
ALP SERPL-CCNC: 394 U/L (ref 30–99)
ALT SERPL-CCNC: 55 U/L (ref 2–50)
ALT SERPL-CCNC: 71 U/L (ref 2–50)
AMMONIA PLAS-SCNC: 70 UMOL/L (ref 11–45)
ANION GAP SERPL CALC-SCNC: 10 MMOL/L (ref 7–16)
ANION GAP SERPL CALC-SCNC: 10 MMOL/L (ref 7–16)
ANION GAP SERPL CALC-SCNC: 16 MMOL/L (ref 7–16)
ANION GAP SERPL CALC-SCNC: 20 MMOL/L (ref 7–16)
ANISOCYTOSIS BLD QL SMEAR: ABNORMAL
APPEARANCE UR: CLEAR
APTT PPP: 36 SEC (ref 24.7–36)
AST SERPL-CCNC: 53 U/L (ref 12–45)
AST SERPL-CCNC: 66 U/L (ref 12–45)
B-OH-BUTYR SERPL-MCNC: 1.35 MMOL/L (ref 0.02–0.27)
BASOPHILS # BLD AUTO: 1.6 % (ref 0–1.8)
BASOPHILS # BLD AUTO: 2.6 % (ref 0–1.8)
BASOPHILS # BLD: 0.09 K/UL (ref 0–0.12)
BASOPHILS # BLD: 0.18 K/UL (ref 0–0.12)
BILIRUB SERPL-MCNC: 4.1 MG/DL (ref 0.1–1.5)
BILIRUB SERPL-MCNC: 4.9 MG/DL (ref 0.1–1.5)
BILIRUB UR QL STRIP.AUTO: NEGATIVE
BUN SERPL-MCNC: 10 MG/DL (ref 8–22)
BUN SERPL-MCNC: 10 MG/DL (ref 8–22)
BUN SERPL-MCNC: 9 MG/DL (ref 8–22)
BUN SERPL-MCNC: 9 MG/DL (ref 8–22)
CALCIUM SERPL-MCNC: 7.8 MG/DL (ref 8.5–10.5)
CALCIUM SERPL-MCNC: 8.2 MG/DL (ref 8.5–10.5)
CALCIUM SERPL-MCNC: 8.3 MG/DL (ref 8.5–10.5)
CALCIUM SERPL-MCNC: 9.1 MG/DL (ref 8.5–10.5)
CHLORIDE SERPL-SCNC: 100 MMOL/L (ref 96–112)
CHLORIDE SERPL-SCNC: 93 MMOL/L (ref 96–112)
CHLORIDE SERPL-SCNC: 99 MMOL/L (ref 96–112)
CHLORIDE SERPL-SCNC: 99 MMOL/L (ref 96–112)
CK SERPL-CCNC: 73 U/L (ref 0–154)
CO2 SERPL-SCNC: 14 MMOL/L (ref 20–33)
CO2 SERPL-SCNC: 17 MMOL/L (ref 20–33)
CO2 SERPL-SCNC: 21 MMOL/L (ref 20–33)
CO2 SERPL-SCNC: 23 MMOL/L (ref 20–33)
COLOR UR: YELLOW
CREAT SERPL-MCNC: 0.31 MG/DL (ref 0.5–1.4)
CREAT SERPL-MCNC: 0.33 MG/DL (ref 0.5–1.4)
CREAT SERPL-MCNC: 0.38 MG/DL (ref 0.5–1.4)
CREAT SERPL-MCNC: 0.58 MG/DL (ref 0.5–1.4)
CRP SERPL HS-MCNC: 0.53 MG/DL (ref 0–0.75)
EOSINOPHIL # BLD AUTO: 0.14 K/UL (ref 0–0.51)
EOSINOPHIL # BLD AUTO: 0.61 K/UL (ref 0–0.51)
EOSINOPHIL NFR BLD: 2.4 % (ref 0–6.9)
EOSINOPHIL NFR BLD: 8.7 % (ref 0–6.9)
ERYTHROCYTE [DISTWIDTH] IN BLOOD BY AUTOMATED COUNT: 57.9 FL (ref 35.9–50)
ERYTHROCYTE [DISTWIDTH] IN BLOOD BY AUTOMATED COUNT: 58.9 FL (ref 35.9–50)
ERYTHROCYTE [DISTWIDTH] IN BLOOD BY AUTOMATED COUNT: 59.6 FL (ref 35.9–50)
ETHANOL BLD-MCNC: 43.2 MG/DL (ref 0–10)
FLUAV RNA SPEC QL NAA+PROBE: NEGATIVE
FLUBV RNA SPEC QL NAA+PROBE: NEGATIVE
GLOBULIN SER CALC-MCNC: 2.5 G/DL (ref 1.9–3.5)
GLOBULIN SER CALC-MCNC: 3 G/DL (ref 1.9–3.5)
GLUCOSE BLD-MCNC: 193 MG/DL (ref 65–99)
GLUCOSE BLD-MCNC: 203 MG/DL (ref 65–99)
GLUCOSE BLD-MCNC: 260 MG/DL (ref 65–99)
GLUCOSE BLD-MCNC: 302 MG/DL (ref 65–99)
GLUCOSE BLD-MCNC: 330 MG/DL (ref 65–99)
GLUCOSE BLD-MCNC: 333 MG/DL (ref 65–99)
GLUCOSE BLD-MCNC: 345 MG/DL (ref 65–99)
GLUCOSE BLD-MCNC: 412 MG/DL (ref 65–99)
GLUCOSE BLD-MCNC: 541 MG/DL (ref 65–99)
GLUCOSE SERPL-MCNC: 211 MG/DL (ref 65–99)
GLUCOSE SERPL-MCNC: 319 MG/DL (ref 65–99)
GLUCOSE SERPL-MCNC: 441 MG/DL (ref 65–99)
GLUCOSE SERPL-MCNC: 634 MG/DL (ref 65–99)
GLUCOSE UR STRIP.AUTO-MCNC: >=1000 MG/DL
HCT VFR BLD AUTO: 25 % (ref 42–52)
HCT VFR BLD AUTO: 26 % (ref 42–52)
HCT VFR BLD AUTO: 30.3 % (ref 42–52)
HGB BLD-MCNC: 7.4 G/DL (ref 14–18)
HGB BLD-MCNC: 7.4 G/DL (ref 14–18)
HGB BLD-MCNC: 8.8 G/DL (ref 14–18)
HYPOCHROMIA BLD QL SMEAR: ABNORMAL
IMM GRANULOCYTES # BLD AUTO: 0.03 K/UL (ref 0–0.11)
IMM GRANULOCYTES NFR BLD AUTO: 0.5 % (ref 0–0.9)
INR PPP: 1.76 (ref 0.87–1.13)
KETONES UR STRIP.AUTO-MCNC: 15 MG/DL
LACTATE BLD-SCNC: 2.1 MMOL/L (ref 0.5–2)
LACTATE BLD-SCNC: 4.8 MMOL/L (ref 0.5–2)
LACTATE BLD-SCNC: 5.8 MMOL/L (ref 0.5–2)
LEUKOCYTE ESTERASE UR QL STRIP.AUTO: NEGATIVE
LYMPHOCYTES # BLD AUTO: 1.04 K/UL (ref 1–4.8)
LYMPHOCYTES # BLD AUTO: 1.05 K/UL (ref 1–4.8)
LYMPHOCYTES NFR BLD: 14.8 % (ref 22–41)
LYMPHOCYTES NFR BLD: 18.3 % (ref 22–41)
MACROCYTES BLD QL SMEAR: ABNORMAL
MAGNESIUM SERPL-MCNC: 1.7 MG/DL (ref 1.5–2.5)
MAGNESIUM SERPL-MCNC: 2 MG/DL (ref 1.5–2.5)
MANUAL DIFF BLD: NORMAL
MCH RBC QN AUTO: 23.1 PG (ref 27–33)
MCH RBC QN AUTO: 23.6 PG (ref 27–33)
MCH RBC QN AUTO: 23.7 PG (ref 27–33)
MCHC RBC AUTO-ENTMCNC: 28.5 G/DL (ref 33.7–35.3)
MCHC RBC AUTO-ENTMCNC: 29 G/DL (ref 33.7–35.3)
MCHC RBC AUTO-ENTMCNC: 29.6 G/DL (ref 33.7–35.3)
MCV RBC AUTO: 79.6 FL (ref 81.4–97.8)
MCV RBC AUTO: 81.3 FL (ref 81.4–97.8)
MCV RBC AUTO: 81.5 FL (ref 81.4–97.8)
MICRO URNS: ABNORMAL
MONOCYTES # BLD AUTO: 0.61 K/UL (ref 0–0.85)
MONOCYTES # BLD AUTO: 1.15 K/UL (ref 0–0.85)
MONOCYTES NFR BLD AUTO: 20 % (ref 0–13.4)
MONOCYTES NFR BLD AUTO: 8.7 % (ref 0–13.4)
MORPHOLOGY BLD-IMP: NORMAL
MORPHOLOGY BLD-IMP: NORMAL
MYELOCYTES NFR BLD MANUAL: 1.7 %
NEUTROPHILS # BLD AUTO: 3.29 K/UL (ref 1.82–7.42)
NEUTROPHILS # BLD AUTO: 4.45 K/UL (ref 1.82–7.42)
NEUTROPHILS NFR BLD: 57.2 % (ref 44–72)
NEUTROPHILS NFR BLD: 63.5 % (ref 44–72)
NITRITE UR QL STRIP.AUTO: NEGATIVE
NRBC # BLD AUTO: 0 K/UL
NRBC # BLD AUTO: 0.02 K/UL
NRBC BLD-RTO: 0 /100 WBC
NRBC BLD-RTO: 0.3 /100 WBC
NT-PROBNP SERPL IA-MCNC: 51 PG/ML (ref 0–125)
OVALOCYTES BLD QL SMEAR: NORMAL
PH UR STRIP.AUTO: 5 [PH] (ref 5–8)
PHOSPHATE SERPL-MCNC: 3.1 MG/DL (ref 2.5–4.5)
PLATELET # BLD AUTO: 113 K/UL (ref 164–446)
PLATELET # BLD AUTO: 88 K/UL (ref 164–446)
PLATELET # BLD AUTO: 92 K/UL (ref 164–446)
PLATELET BLD QL SMEAR: NORMAL
PMV BLD AUTO: 10.8 FL (ref 9–12.9)
PMV BLD AUTO: 11 FL (ref 9–12.9)
PMV BLD AUTO: 9.8 FL (ref 9–12.9)
POIKILOCYTOSIS BLD QL SMEAR: NORMAL
POLYCHROMASIA BLD QL SMEAR: NORMAL
POTASSIUM SERPL-SCNC: 3.5 MMOL/L (ref 3.6–5.5)
POTASSIUM SERPL-SCNC: 4 MMOL/L (ref 3.6–5.5)
POTASSIUM SERPL-SCNC: 4.4 MMOL/L (ref 3.6–5.5)
POTASSIUM SERPL-SCNC: 4.5 MMOL/L (ref 3.6–5.5)
PROCALCITONIN SERPL-MCNC: 0.29 NG/ML
PROT SERPL-MCNC: 5.8 G/DL (ref 6–8.2)
PROT SERPL-MCNC: 7 G/DL (ref 6–8.2)
PROT UR QL STRIP: NEGATIVE MG/DL
PROTHROMBIN TIME: 20 SEC (ref 12–14.6)
RBC # BLD AUTO: 3.14 M/UL (ref 4.7–6.1)
RBC # BLD AUTO: 3.2 M/UL (ref 4.7–6.1)
RBC # BLD AUTO: 3.72 M/UL (ref 4.7–6.1)
RBC BLD AUTO: PRESENT
RBC UR QL AUTO: NEGATIVE
RSV RNA SPEC QL NAA+PROBE: NEGATIVE
SARS-COV-2 RNA RESP QL NAA+PROBE: NOTDETECTED
SODIUM SERPL-SCNC: 127 MMOL/L (ref 135–145)
SODIUM SERPL-SCNC: 131 MMOL/L (ref 135–145)
SODIUM SERPL-SCNC: 132 MMOL/L (ref 135–145)
SODIUM SERPL-SCNC: 132 MMOL/L (ref 135–145)
SP GR UR STRIP.AUTO: 1.03
SPECIMEN SOURCE: NORMAL
TROPONIN T SERPL-MCNC: 15 NG/L (ref 6–19)
UROBILINOGEN UR STRIP.AUTO-MCNC: 0.2 MG/DL
WBC # BLD AUTO: 5.8 K/UL (ref 4.8–10.8)
WBC # BLD AUTO: 6.9 K/UL (ref 4.8–10.8)
WBC # BLD AUTO: 7 K/UL (ref 4.8–10.8)

## 2021-11-22 PROCEDURE — 83605 ASSAY OF LACTIC ACID: CPT | Mod: 91

## 2021-11-22 PROCEDURE — 85007 BL SMEAR W/DIFF WBC COUNT: CPT

## 2021-11-22 PROCEDURE — 700102 HCHG RX REV CODE 250 W/ 637 OVERRIDE(OP): Performed by: HOSPITALIST

## 2021-11-22 PROCEDURE — 82550 ASSAY OF CK (CPK): CPT

## 2021-11-22 PROCEDURE — 700105 HCHG RX REV CODE 258: Performed by: EMERGENCY MEDICINE

## 2021-11-22 PROCEDURE — 700105 HCHG RX REV CODE 258: Performed by: PSYCHIATRY & NEUROLOGY

## 2021-11-22 PROCEDURE — C9803 HOPD COVID-19 SPEC COLLECT: HCPCS | Performed by: EMERGENCY MEDICINE

## 2021-11-22 PROCEDURE — 84145 PROCALCITONIN (PCT): CPT

## 2021-11-22 PROCEDURE — 85025 COMPLETE CBC W/AUTO DIFF WBC: CPT

## 2021-11-22 PROCEDURE — 81003 URINALYSIS AUTO W/O SCOPE: CPT

## 2021-11-22 PROCEDURE — A9270 NON-COVERED ITEM OR SERVICE: HCPCS | Performed by: INTERNAL MEDICINE

## 2021-11-22 PROCEDURE — 87040 BLOOD CULTURE FOR BACTERIA: CPT

## 2021-11-22 PROCEDURE — 96365 THER/PROPH/DIAG IV INF INIT: CPT

## 2021-11-22 PROCEDURE — 86140 C-REACTIVE PROTEIN: CPT

## 2021-11-22 PROCEDURE — 700111 HCHG RX REV CODE 636 W/ 250 OVERRIDE (IP): Performed by: STUDENT IN AN ORGANIZED HEALTH CARE EDUCATION/TRAINING PROGRAM

## 2021-11-22 PROCEDURE — 85027 COMPLETE CBC AUTOMATED: CPT

## 2021-11-22 PROCEDURE — 99291 CRITICAL CARE FIRST HOUR: CPT

## 2021-11-22 PROCEDURE — 99223 1ST HOSP IP/OBS HIGH 75: CPT | Performed by: HOSPITALIST

## 2021-11-22 PROCEDURE — 84100 ASSAY OF PHOSPHORUS: CPT

## 2021-11-22 PROCEDURE — 85730 THROMBOPLASTIN TIME PARTIAL: CPT

## 2021-11-22 PROCEDURE — 96368 THER/DIAG CONCURRENT INF: CPT

## 2021-11-22 PROCEDURE — 83735 ASSAY OF MAGNESIUM: CPT | Mod: 91

## 2021-11-22 PROCEDURE — 36415 COLL VENOUS BLD VENIPUNCTURE: CPT

## 2021-11-22 PROCEDURE — 85610 PROTHROMBIN TIME: CPT

## 2021-11-22 PROCEDURE — 80053 COMPREHEN METABOLIC PANEL: CPT | Mod: 91

## 2021-11-22 PROCEDURE — 82010 KETONE BODYS QUAN: CPT

## 2021-11-22 PROCEDURE — 99291 CRITICAL CARE FIRST HOUR: CPT | Performed by: PSYCHIATRY & NEUROLOGY

## 2021-11-22 PROCEDURE — 700102 HCHG RX REV CODE 250 W/ 637 OVERRIDE(OP): Performed by: INTERNAL MEDICINE

## 2021-11-22 PROCEDURE — 96372 THER/PROPH/DIAG INJ SC/IM: CPT

## 2021-11-22 PROCEDURE — 71045 X-RAY EXAM CHEST 1 VIEW: CPT

## 2021-11-22 PROCEDURE — 84484 ASSAY OF TROPONIN QUANT: CPT

## 2021-11-22 PROCEDURE — A9270 NON-COVERED ITEM OR SERVICE: HCPCS | Performed by: PSYCHIATRY & NEUROLOGY

## 2021-11-22 PROCEDURE — 700111 HCHG RX REV CODE 636 W/ 250 OVERRIDE (IP): Performed by: PSYCHIATRY & NEUROLOGY

## 2021-11-22 PROCEDURE — 700102 HCHG RX REV CODE 250 W/ 637 OVERRIDE(OP): Performed by: PSYCHIATRY & NEUROLOGY

## 2021-11-22 PROCEDURE — 93005 ELECTROCARDIOGRAM TRACING: CPT | Performed by: EMERGENCY MEDICINE

## 2021-11-22 PROCEDURE — 700102 HCHG RX REV CODE 250 W/ 637 OVERRIDE(OP): Performed by: STUDENT IN AN ORGANIZED HEALTH CARE EDUCATION/TRAINING PROGRAM

## 2021-11-22 PROCEDURE — 0241U HCHG SARS-COV-2 COVID-19 NFCT DS RESP RNA 4 TRGT MIC: CPT

## 2021-11-22 PROCEDURE — A9270 NON-COVERED ITEM OR SERVICE: HCPCS | Performed by: STUDENT IN AN ORGANIZED HEALTH CARE EDUCATION/TRAINING PROGRAM

## 2021-11-22 PROCEDURE — 82140 ASSAY OF AMMONIA: CPT

## 2021-11-22 PROCEDURE — 770006 HCHG ROOM/CARE - MED/SURG/GYN SEMI*

## 2021-11-22 PROCEDURE — 83880 ASSAY OF NATRIURETIC PEPTIDE: CPT

## 2021-11-22 PROCEDURE — 80048 BASIC METABOLIC PNL TOTAL CA: CPT | Mod: 91

## 2021-11-22 PROCEDURE — 82077 ASSAY SPEC XCP UR&BREATH IA: CPT

## 2021-11-22 PROCEDURE — 82962 GLUCOSE BLOOD TEST: CPT | Mod: 91

## 2021-11-22 PROCEDURE — 700102 HCHG RX REV CODE 250 W/ 637 OVERRIDE(OP): Performed by: EMERGENCY MEDICINE

## 2021-11-22 PROCEDURE — A9270 NON-COVERED ITEM OR SERVICE: HCPCS | Performed by: HOSPITALIST

## 2021-11-22 RX ORDER — PHYTONADIONE 5 MG/1
10 TABLET ORAL DAILY
Status: COMPLETED | OUTPATIENT
Start: 2021-11-22 | End: 2021-11-23

## 2021-11-22 RX ORDER — POTASSIUM CHLORIDE 7.45 MG/ML
10 INJECTION INTRAVENOUS
Status: COMPLETED | OUTPATIENT
Start: 2021-11-22 | End: 2021-11-22

## 2021-11-22 RX ORDER — SODIUM CHLORIDE, SODIUM LACTATE, POTASSIUM CHLORIDE, CALCIUM CHLORIDE 600; 310; 30; 20 MG/100ML; MG/100ML; MG/100ML; MG/100ML
1000 INJECTION, SOLUTION INTRAVENOUS ONCE
Status: COMPLETED | OUTPATIENT
Start: 2021-11-22 | End: 2021-11-22

## 2021-11-22 RX ORDER — BISACODYL 10 MG
10 SUPPOSITORY, RECTAL RECTAL
Status: DISCONTINUED | OUTPATIENT
Start: 2021-11-22 | End: 2021-11-22

## 2021-11-22 RX ORDER — GAUZE BANDAGE 2" X 2"
100 BANDAGE TOPICAL DAILY
Status: DISCONTINUED | OUTPATIENT
Start: 2021-11-22 | End: 2021-11-23 | Stop reason: HOSPADM

## 2021-11-22 RX ORDER — SODIUM CHLORIDE, SODIUM LACTATE, POTASSIUM CHLORIDE, AND CALCIUM CHLORIDE .6; .31; .03; .02 G/100ML; G/100ML; G/100ML; G/100ML
2000 INJECTION, SOLUTION INTRAVENOUS ONCE
Status: COMPLETED | OUTPATIENT
Start: 2021-11-22 | End: 2021-11-22

## 2021-11-22 RX ORDER — POTASSIUM CHLORIDE 7.45 MG/ML
10 INJECTION INTRAVENOUS ONCE
Status: COMPLETED | OUTPATIENT
Start: 2021-11-22 | End: 2021-11-22

## 2021-11-22 RX ORDER — HEPARIN SODIUM 5000 [USP'U]/ML
5000 INJECTION, SOLUTION INTRAVENOUS; SUBCUTANEOUS EVERY 8 HOURS
Status: DISCONTINUED | OUTPATIENT
Start: 2021-11-22 | End: 2021-11-22

## 2021-11-22 RX ORDER — DEXTROSE MONOHYDRATE 25 G/50ML
50 INJECTION, SOLUTION INTRAVENOUS
Status: DISCONTINUED | OUTPATIENT
Start: 2021-11-22 | End: 2021-11-23 | Stop reason: HOSPADM

## 2021-11-22 RX ORDER — GABAPENTIN 100 MG/1
100 CAPSULE ORAL 3 TIMES DAILY
Status: DISCONTINUED | OUTPATIENT
Start: 2021-11-22 | End: 2021-11-23 | Stop reason: HOSPADM

## 2021-11-22 RX ORDER — CHOLECALCIFEROL (VITAMIN D3) 125 MCG
5 CAPSULE ORAL NIGHTLY PRN
Status: DISCONTINUED | OUTPATIENT
Start: 2021-11-22 | End: 2021-11-23 | Stop reason: HOSPADM

## 2021-11-22 RX ORDER — MAGNESIUM SULFATE HEPTAHYDRATE 40 MG/ML
4 INJECTION, SOLUTION INTRAVENOUS
Status: COMPLETED | OUTPATIENT
Start: 2021-11-22 | End: 2021-11-22

## 2021-11-22 RX ORDER — MAGNESIUM SULFATE HEPTAHYDRATE 40 MG/ML
2 INJECTION, SOLUTION INTRAVENOUS
Status: COMPLETED | OUTPATIENT
Start: 2021-11-22 | End: 2021-11-22

## 2021-11-22 RX ORDER — DEXTROSE AND SODIUM CHLORIDE 10; .45 G/100ML; G/100ML
INJECTION, SOLUTION INTRAVENOUS CONTINUOUS
Status: ACTIVE | OUTPATIENT
Start: 2021-11-22 | End: 2021-11-22

## 2021-11-22 RX ORDER — SODIUM CHLORIDE, SODIUM LACTATE, POTASSIUM CHLORIDE, CALCIUM CHLORIDE 600; 310; 30; 20 MG/100ML; MG/100ML; MG/100ML; MG/100ML
INJECTION, SOLUTION INTRAVENOUS CONTINUOUS
Status: ACTIVE | OUTPATIENT
Start: 2021-11-22 | End: 2021-11-22

## 2021-11-22 RX ORDER — SODIUM CHLORIDE 9 MG/ML
2000 INJECTION, SOLUTION INTRAVENOUS ONCE
Status: DISCONTINUED | OUTPATIENT
Start: 2021-11-22 | End: 2021-11-22

## 2021-11-22 RX ORDER — POLYETHYLENE GLYCOL 3350 17 G/17G
1 POWDER, FOR SOLUTION ORAL
Status: DISCONTINUED | OUTPATIENT
Start: 2021-11-22 | End: 2021-11-22

## 2021-11-22 RX ORDER — POTASSIUM CHLORIDE 20 MEQ/1
20 TABLET, EXTENDED RELEASE ORAL DAILY
Status: DISCONTINUED | OUTPATIENT
Start: 2021-11-22 | End: 2021-11-23

## 2021-11-22 RX ORDER — AMOXICILLIN 250 MG
2 CAPSULE ORAL 2 TIMES DAILY
Status: DISCONTINUED | OUTPATIENT
Start: 2021-11-22 | End: 2021-11-22

## 2021-11-22 RX ORDER — DEXTROSE, SODIUM CHLORIDE, SODIUM LACTATE, POTASSIUM CHLORIDE, AND CALCIUM CHLORIDE 5; .6; .31; .03; .02 G/100ML; G/100ML; G/100ML; G/100ML; G/100ML
INJECTION, SOLUTION INTRAVENOUS CONTINUOUS
Status: ACTIVE | OUTPATIENT
Start: 2021-11-22 | End: 2021-11-22

## 2021-11-22 RX ORDER — OMEPRAZOLE 20 MG/1
20 CAPSULE, DELAYED RELEASE ORAL 2 TIMES DAILY
Status: DISCONTINUED | OUTPATIENT
Start: 2021-11-22 | End: 2021-11-23 | Stop reason: HOSPADM

## 2021-11-22 RX ORDER — LACTULOSE 20 G/30ML
30 SOLUTION ORAL 3 TIMES DAILY
Status: DISCONTINUED | OUTPATIENT
Start: 2021-11-22 | End: 2021-11-23 | Stop reason: HOSPADM

## 2021-11-22 RX ORDER — INSULIN LISPRO 100 [IU]/ML
2-9 INJECTION, SOLUTION INTRAVENOUS; SUBCUTANEOUS
Status: DISCONTINUED | OUTPATIENT
Start: 2021-11-22 | End: 2021-11-23 | Stop reason: HOSPADM

## 2021-11-22 RX ADMIN — RIFAXIMIN 550 MG: 550 TABLET ORAL at 05:09

## 2021-11-22 RX ADMIN — POTASSIUM CHLORIDE 10 MEQ: 7.46 INJECTION, SOLUTION INTRAVENOUS at 10:10

## 2021-11-22 RX ADMIN — POTASSIUM CHLORIDE 10 MEQ: 7.46 INJECTION, SOLUTION INTRAVENOUS at 11:02

## 2021-11-22 RX ADMIN — LACTULOSE 30 ML: 20 SOLUTION ORAL at 11:21

## 2021-11-22 RX ADMIN — SODIUM CHLORIDE, POTASSIUM CHLORIDE, SODIUM LACTATE AND CALCIUM CHLORIDE: 600; 310; 30; 20 INJECTION, SOLUTION INTRAVENOUS at 06:30

## 2021-11-22 RX ADMIN — SODIUM CHLORIDE 5 UNITS/HR: 9 INJECTION, SOLUTION INTRAVENOUS at 05:16

## 2021-11-22 RX ADMIN — POTASSIUM CHLORIDE 10 MEQ: 7.46 INJECTION, SOLUTION INTRAVENOUS at 05:44

## 2021-11-22 RX ADMIN — INSULIN LISPRO 3 UNITS: 100 INJECTION, SOLUTION INTRAVENOUS; SUBCUTANEOUS at 12:28

## 2021-11-22 RX ADMIN — POTASSIUM CHLORIDE 20 MEQ: 1500 TABLET, EXTENDED RELEASE ORAL at 15:52

## 2021-11-22 RX ADMIN — INSULIN LISPRO 6 UNITS: 100 INJECTION, SOLUTION INTRAVENOUS; SUBCUTANEOUS at 17:16

## 2021-11-22 RX ADMIN — SODIUM CHLORIDE 5 UNITS/HR: 9 INJECTION, SOLUTION INTRAVENOUS at 06:30

## 2021-11-22 RX ADMIN — INSULIN LISPRO 6 UNITS: 100 INJECTION, SOLUTION INTRAVENOUS; SUBCUTANEOUS at 23:01

## 2021-11-22 RX ADMIN — HEPARIN SODIUM 5000 UNITS: 5000 INJECTION, SOLUTION INTRAVENOUS; SUBCUTANEOUS at 05:09

## 2021-11-22 RX ADMIN — THERA TABS 1 TABLET: TAB at 05:09

## 2021-11-22 RX ADMIN — Medication 5 MG: at 23:47

## 2021-11-22 RX ADMIN — SODIUM CHLORIDE, POTASSIUM CHLORIDE, SODIUM LACTATE AND CALCIUM CHLORIDE 2000 ML: 600; 310; 30; 20 INJECTION, SOLUTION INTRAVENOUS at 04:27

## 2021-11-22 RX ADMIN — OMEPRAZOLE 20 MG: 20 CAPSULE, DELAYED RELEASE ORAL at 17:15

## 2021-11-22 RX ADMIN — GABAPENTIN 100 MG: 100 CAPSULE ORAL at 15:53

## 2021-11-22 RX ADMIN — Medication 100 MG: at 05:09

## 2021-11-22 RX ADMIN — PHYTONADIONE 10 MG: 5 TABLET ORAL at 15:53

## 2021-11-22 RX ADMIN — SODIUM CHLORIDE, POTASSIUM CHLORIDE, SODIUM LACTATE AND CALCIUM CHLORIDE 1000 ML: 600; 310; 30; 20 INJECTION, SOLUTION INTRAVENOUS at 03:52

## 2021-11-22 RX ADMIN — SODIUM CHLORIDE, POTASSIUM CHLORIDE, SODIUM LACTATE AND CALCIUM CHLORIDE: 600; 310; 30; 20 INJECTION, SOLUTION INTRAVENOUS at 04:45

## 2021-11-22 RX ADMIN — INSULIN GLARGINE 20 UNITS: 100 INJECTION, SOLUTION SUBCUTANEOUS at 10:22

## 2021-11-22 RX ADMIN — MAGNESIUM SULFATE IN WATER 2 G: 40 INJECTION, SOLUTION INTRAVENOUS at 06:44

## 2021-11-22 ASSESSMENT — ENCOUNTER SYMPTOMS
DIZZINESS: 0
EYES NEGATIVE: 1
DIARRHEA: 0
NERVOUS/ANXIOUS: 0
FEVER: 0
NEUROLOGICAL NEGATIVE: 1
CHILLS: 0
BLOOD IN STOOL: 1
EYE DISCHARGE: 0
MUSCULOSKELETAL NEGATIVE: 1
PALPITATIONS: 0
SHORTNESS OF BREATH: 0
NAUSEA: 0
STRIDOR: 0
COUGH: 0
PSYCHIATRIC NEGATIVE: 1
BACK PAIN: 0
DEPRESSION: 0
SPEECH CHANGE: 0
HEARTBURN: 1
ABDOMINAL PAIN: 0
HEADACHES: 0
GASTROINTESTINAL NEGATIVE: 1

## 2021-11-22 ASSESSMENT — FIBROSIS 4 INDEX
FIB4 SCORE: 3.42
FIB4 SCORE: 4.76

## 2021-11-22 ASSESSMENT — LIFESTYLE VARIABLES
DOES PATIENT WANT TO STOP DRINKING: NO
TOTAL SCORE: 0
HAVE PEOPLE ANNOYED YOU BY CRITICIZING YOUR DRINKING: NO
CONSUMPTION TOTAL: NEGATIVE
ON A TYPICAL DAY WHEN YOU DRINK ALCOHOL HOW MANY DRINKS DO YOU HAVE: 0
EVER FELT BAD OR GUILTY ABOUT YOUR DRINKING: NO
HAVE YOU EVER FELT YOU SHOULD CUT DOWN ON YOUR DRINKING: NO
EVER HAD A DRINK FIRST THING IN THE MORNING TO STEADY YOUR NERVES TO GET RID OF A HANGOVER: NO
TOTAL SCORE: 0
TOTAL SCORE: 0
HOW MANY TIMES IN THE PAST YEAR HAVE YOU HAD 5 OR MORE DRINKS IN A DAY: 0
AVERAGE NUMBER OF DAYS PER WEEK YOU HAVE A DRINK CONTAINING ALCOHOL: 0
DO YOU DRINK ALCOHOL: NO

## 2021-11-22 ASSESSMENT — PAIN DESCRIPTION - PAIN TYPE: TYPE: ACUTE PAIN

## 2021-11-22 NOTE — CONSULTS
"Hospital Medicine Consultation    Date of Service  11/22/2021    Referring Physician  Kamla Magallanes M.D.    Consulting Physician  Enrique Aggarwal D.O.    Reason for Consultation  Transfer of care out of ICU    Chief Complaint:  High blood sugar    History of Presenting Illness  Enrique Moreno is 46 y.o. male with alcoholic cirrhosis, prior variceal bleeding, recent A1c:7.1, asthma who presented 11/22/2021 with hyperglycemia recent polydipsia and polyuria with concerns of checking his blood sugars which showed \"high.\"  He was admitted to the intensive care with a diagnosis of diabetic ketoacidosis.  He had recently been using steroids.  He had an elevated lactic acid of 5.8, and NH3:70 on admit.    11/22 Patient alert and oriented x 3.  Off insulin drip and very hungry (awaiting food tray).  He has some bright red blood in toilet but hgb has remained stable. The patient states brown stool with drops of blood there after.  On repeat bowel movement later today no blood per RN.  The patient is getting diabetic education today.      Review of Systems  Review of Systems   Constitutional: Negative for chills and fever.   Eyes: Negative for discharge.   Respiratory: Negative for cough, shortness of breath and stridor.    Cardiovascular: Negative for chest pain, palpitations and leg swelling.   Gastrointestinal: Positive for blood in stool and heartburn. Negative for abdominal pain, diarrhea and nausea.   Genitourinary: Negative for dysuria and hematuria.   Musculoskeletal: Negative for back pain and joint pain.   Skin: Negative for rash.   Neurological: Negative for dizziness, speech change and headaches.   Psychiatric/Behavioral: Negative for depression. The patient is not nervous/anxious.        Past Medical History  Cirrhosis  Alcohol abuse  Asthma  Prior variceal bleed    Surgical History   has a past surgical history that includes pr upper gi endoscopy,diagnosis (N/A, 11/10/2021).    Family History  family " history includes Diabetes in his father; Heart Attack in his maternal grandfather; Lung Disease in his mother; Other in his brother, brother, and mother.    Social History   reports that he has been smoking cigarettes. He has a 20.00 pack-year smoking history. He quit smokeless tobacco use about 9 years ago.  His smokeless tobacco use included chew. He reports previous alcohol use of about 21.0 oz of alcohol per week. He reports current drug use. Drug: Marijuana.  and has 5 children.  Works at [employer].    Medications  Prior to Admission Medications   Prescriptions Last Dose Informant Patient Reported? Taking?   metFORMIN (GLUCOPHAGE) 500 MG Tab 11/21/2021 at pm  No No   Sig: Take 1 Tablet by mouth 2 times a day with meals.   methylPREDNISolone (MEDROL DOSEPAK) 4 MG Tablet Therapy Pack 11/21/2021 at am  No No   Sig: Follow schedule on package instructions.   omeprazole (PRILOSEC) 20 MG delayed-release capsule 11/21/2021 at pm  No No   Sig: Take 1 Capsule by mouth 2 times a day.   sucralfate (CARAFATE) 1 GM/10ML Suspension 11/21/2021 at pm  No No   Sig: Take 10 mL by mouth every 8 hours.      Facility-Administered Medications: None       Allergies  No Known Allergies    Physical Exam  Temp:  [36.2 °C (97.1 °F)-36.7 °C (98.1 °F)] 36.3 °C (97.3 °F)  Pulse:  [] 89  Resp:  [13-20] 17  BP: (119-148)/(65-83) 134/69  SpO2:  [90 %-97 %] 97 %    Physical Exam  Vitals reviewed.   Constitutional:       Appearance: Normal appearance. He is not diaphoretic.   HENT:      Head: Normocephalic and atraumatic.      Nose: Nose normal.      Mouth/Throat:      Mouth: Mucous membranes are moist.      Pharynx: No oropharyngeal exudate.   Eyes:      General: No scleral icterus.        Right eye: No discharge.         Left eye: No discharge.      Extraocular Movements: Extraocular movements intact.      Conjunctiva/sclera: Conjunctivae normal.   Cardiovascular:      Rate and Rhythm: Normal rate and regular rhythm.      Pulses:            Radial pulses are 2+ on the right side and 2+ on the left side.        Dorsalis pedis pulses are 2+ on the right side and 2+ on the left side.      Heart sounds: No murmur heard.      Pulmonary:      Effort: Pulmonary effort is normal. No respiratory distress.      Breath sounds: Normal breath sounds. No wheezing or rales.   Abdominal:      General: Bowel sounds are normal. There is distension.      Palpations: Abdomen is soft.      Tenderness: There is no abdominal tenderness. There is no guarding.   Musculoskeletal:         General: No swelling or tenderness.      Cervical back: Neck supple. No muscular tenderness.      Right lower leg: No edema.      Left lower leg: No edema.   Lymphadenopathy:      Cervical: No cervical adenopathy.   Skin:     Coloration: Skin is not jaundiced or pale.   Neurological:      General: No focal deficit present.      Mental Status: He is alert and oriented to person, place, and time. Mental status is at baseline.      Cranial Nerves: No cranial nerve deficit.   Psychiatric:         Mood and Affect: Mood normal.         Behavior: Behavior normal.         Fluids  Date 11/22/21 0700 - 11/23/21 0659   Shift 8579-3247 2477-7506 6334-1871 24 Hour Total   INTAKE   I.V. 1246.6   1246.6   IV Piggyback 283.3   283.3   Shift Total 1529.9   1529.9   OUTPUT   Urine 300   300   Shift Total 300   300   Weight (kg) 97.5 97.5 97.5 97.5       Laboratory  Recent Labs     11/22/21  0302 11/22/21  0522 11/22/21  1345   WBC 7.0 5.8 6.9   RBC 3.72* 3.20* 3.14*   HEMOGLOBIN 8.8* 7.4* 7.4*   HEMATOCRIT 30.3* 26.0* 25.0*   MCV 81.5 81.3* 79.6*   MCH 23.7* 23.1* 23.6*   MCHC 29.0* 28.5* 29.6*   RDW 59.6* 58.9* 57.9*   PLATELETCT 113* 92* 88*   MPV 11.0 10.8 9.8     Recent Labs     11/22/21  0522 11/22/21  0720 11/22/21  1345   SODIUM 132* 131* 132*   POTASSIUM 4.4 4.0 3.5*   CHLORIDE 99 100 99   CO2 17* 21 23   GLUCOSE 441* 319* 211*   BUN 9 9 10   CREATININE 0.38* 0.33* 0.31*   CALCIUM 8.3* 8.2* 7.8*      Recent Labs     11/22/21  0302   APTT 36.0   INR 1.76*                 Imaging  DX-CHEST-PORTABLE (1 VIEW)   Final Result      1.  Hypoventilatory changes without other evidence for acute cardiopulmonary abnormality.          Assessment/Plan  Hyponatremia  Assessment & Plan  11/22 Na:131  Cirrhosis and dehydration  S/p IV fluids  Watch for volume overload.    Lactate blood increase  Assessment & Plan  downtrended  Likely due to DKA and dehydration    DKA (diabetic ketoacidosis) (HCC)  Assessment & Plan  Insulin drip in ICU but transitioned off  Question if more insulin deficit from pancreatic damage from long term EtOH use.  Diabetes education  Monitor accuchecks and cover with SSI    Hypokalemia- (present on admission)  Assessment & Plan  Replace K  Monitor labs    Cirrhosis (HCC)  Assessment & Plan  States last drink was over three weeks ago  PPI and carafate  Coagulopathy with elevated INR  Elevated ammonia and on lactulose, A+Ox3  Start lasix and aldactone if signs of ascites    Thrombocytopenia (HCC)- (present on admission)  Assessment & Plan  11/22 PLT:113  Due to cirrhosis  Monitor cbc    Anemia- (present on admission)  Assessment & Plan  11/22 Hgb:7.4  Monitor cbc  Hx of GIB  Underlying cirrhosis    Coagulopathy (HCC)  Assessment & Plan  Due to cirrhosis   Vit K  Monitor INR  No current sign of active bleed  Concern of hemorrhoids

## 2021-11-22 NOTE — ED NOTES
Med Rec completed: per pt at bedside.    Dispense records show a Medrol marko to be started in December, pt reports currently taking, unknown start date.    No ORAL antibiotics in last 30 days    [facility]     Pt confirmed following allergies:  No Known Allergies     Pt's home medications:   Medication Sig   • omeprazole (PRILOSEC) 20 MG delayed-release capsule Take 1 Capsule by mouth 2 times a day.   • sucralfate (CARAFATE) 1 GM/10ML Suspension Take 10 mL by mouth every 8 hours.   • metFORMIN (GLUCOPHAGE) 500 MG Tab Take 1 Tablet by mouth 2 times a day with meals.   • methylPREDNISolone (MEDROL DOSEPAK) 4 MG Tablet Therapy Pack Follow schedule on package instructions.

## 2021-11-22 NOTE — ASSESSMENT & PLAN NOTE
Likely poor clearance and dehydration, no infectious signs  Thiamin and volume repletion  Can check another in 2 hours but unlikely to change clinical treatment plan

## 2021-11-22 NOTE — PROGRESS NOTES
Pt transferred from ED red 12 to T625 in stable condition. Patient oriented to room and given call light. Insulin gtt and potassium replacement infusing.

## 2021-11-22 NOTE — ASSESSMENT & PLAN NOTE
Very mild, anticipate that this will correct rather quickly  ICU admission, cardiac monitoring  Optimize intravascular volume with IVF bolus  DKA protocol with insulin drip at 0.05 units/kg/hr  Every hour Accu-Cheks, every 4 hour BMP, mag, phos  Goal Magnesium: >2, Phosphorus: 2, K >4  Will transition to sliding scale insulin and sub q insulin when anion gap <12, CO2 > 17

## 2021-11-22 NOTE — ED PROVIDER NOTES
"ED Provider Note    CHIEF COMPLAINT  Chief Complaint   Patient presents with   • Hyperglycemia     BIBA for \"high\" glucose reading by EMS. Pt recently prescribed metformin but denies being diagnosed with DM. Reports increased thirst, hunger and urination starting today. Was given 500 ml bolus by ems.   • Polyuria   • Polydipsia   • Fall     Pt reports GLF today and yesterday with increasing weakness        HPI    Primary care provider: MARIAMA Frausto  Means of arrival: EMS  History obtained from: Patient  History limited by: nothing    Enrique Drake is a 46 y.o. male who presents with elevated glucose, polyuria, polydipsia.  Feeling generally unwell for several days.  Recently was admitted to the hospital with concerns for GI bleed and he does have a history of cirrhosis secondary to alcohol abuse.  He was started on prednisolone recently and also Metformin.  He does not think he has a history of diabetes but his blood sugar was found to be high for paramedics.  IV fluids were started by medics.  Patient denies any alleviating measures at home.  No aggravating factors symptoms are constant and worsening for several days or longer.  He has no abdominal pain or black or bloody output.    REVIEW OF SYSTEMS  Constitutional: Negative for fever or chills.  Positive for fatigue.  HENT: Negative for rhinorrhea or sore throat.    Respiratory: Negative for cough or shortness of breath.    Cardiovascular: Negative for chest pain or syncope.   Gastrointestinal: Negative for nausea, vomiting, or abdominal pain.   Genitourinary: Negative for dysuria or flank pain.   Musculoskeletal: Negative for back pain or joint pain.   Skin: Negative for itching or rash.   Neurological: Negative for sensory or motor changes.   See HPI for further details. All other systems are negative.     PAST MEDICAL HISTORY  History of cirrhosis alcohol abuse.    PAST FAMILY HISTORY  Family History   Problem Relation Age of Onset   • Other " Mother         smoker   • Lung Disease Mother         COPD   • Diabetes Father    • Other Brother         gout   • Heart Attack Maternal Grandfather    • Other Brother         epilepsy       SOCIAL HISTORY  Social History     Tobacco Use   • Smoking status: Current Every Day Smoker     Packs/day: 1.00     Years: 20.00     Pack years: 20.00     Types: Cigarettes     Last attempt to quit: 3/3/2013     Years since quittin.7   • Smokeless tobacco: Former User     Types: Chew     Quit date: 3/4/2012   • Tobacco comment: chewed for 10 years   Substance and Sexual Activity   • Alcohol use: Not Currently     Alcohol/week: 21.0 oz     Types: 42 Cans of beer per week   • Drug use: Yes     Types: Marijuana     Comment: at night prn   • Sexual activity: Yes     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    CURRENT MEDICATIONS  Home Medications     Reviewed by Angela Almanza (Pharmacy Tech) on 21 at 0429  Med List Status: Complete   Medication Last Dose Status   metFORMIN (GLUCOPHAGE) 500 MG Tab 2021 Active   methylPREDNISolone (MEDROL DOSEPAK) 4 MG Tablet Therapy Pack 2021 Active   omeprazole (PRILOSEC) 20 MG delayed-release capsule 2021 Active   sucralfate (CARAFATE) 1 GM/10ML Suspension 2021 Active                ALLERGIES  No Known Allergies    PHYSICAL EXAM  VITAL SIGNS: /42   Pulse 62   Temp 36.2 °C (97.1 °F) (Temporal)   Resp 17   Ht 1.829 m (6')   Wt 97.5 kg (214 lb 15.2 oz)   SpO2 95%   BMI 29.15 kg/m²    Pulse ox interpretation: On room air, I interpret this pulse ox as normal.  Constitutional: Chronically ill appearing for age sitting up in mild distress.  HEENT: Normocephalic, atraumatic. Posterior pharynx clear, mucous membranes very dry.  Eyes:  EOMI. Normal sclerae.  Neck: Supple, nontender.  Chest/Pulmonary: Slightly diminished to ausculation  bilaterally, no wheezes or rhonchi.  Cardiovascular: Regular rate and rhythm, no obvious murmur.   Abdomen: Soft, nontender; no fluid wave or guarding.  Back: No CVA or midline tenderness.   Musculoskeletal: No deformity or edema.  Neuro: Alert, no focal weakness or asymmetry.  Psych: Flat affect but calm pleasant and cooperative.  Skin: Skin is warm and dry, there are scattered telangiectasias.    DIAGNOSTIC STUDIES / PROCEDURES    LABS & EKG  Results for orders placed or performed during the hospital encounter of 11/22/21   CBC WITH DIFFERENTIAL   Result Value Ref Range    WBC 7.0 4.8 - 10.8 K/uL    RBC 3.72 (L) 4.70 - 6.10 M/uL    Hemoglobin 8.8 (L) 14.0 - 18.0 g/dL    Hematocrit 30.3 (L) 42.0 - 52.0 %    MCV 81.5 81.4 - 97.8 fL    MCH 23.7 (L) 27.0 - 33.0 pg    MCHC 29.0 (L) 33.7 - 35.3 g/dL    RDW 59.6 (H) 35.9 - 50.0 fL    Platelet Count 113 (L) 164 - 446 K/uL    MPV 11.0 9.0 - 12.9 fL    Neutrophils-Polys 63.50 44.00 - 72.00 %    Lymphocytes 14.80 (L) 22.00 - 41.00 %    Monocytes 8.70 0.00 - 13.40 %    Eosinophils 8.70 (H) 0.00 - 6.90 %    Basophils 2.60 (H) 0.00 - 1.80 %    Nucleated RBC 0.30 /100 WBC    Neutrophils (Absolute) 4.45 1.82 - 7.42 K/uL    Lymphs (Absolute) 1.04 1.00 - 4.80 K/uL    Monos (Absolute) 0.61 0.00 - 0.85 K/uL    Eos (Absolute) 0.61 (H) 0.00 - 0.51 K/uL    Baso (Absolute) 0.18 (H) 0.00 - 0.12 K/uL    NRBC (Absolute) 0.02 K/uL    Hypochromia 2+ (A)     Anisocytosis 1+     Macrocytosis 1+    COMP METABOLIC PANEL   Result Value Ref Range    Sodium 127 (L) 135 - 145 mmol/L    Potassium 4.5 3.6 - 5.5 mmol/L    Chloride 93 (L) 96 - 112 mmol/L    Co2 14 (L) 20 - 33 mmol/L    Anion Gap 20.0 (H) 7.0 - 16.0    Glucose 634 (HH) 65 - 99 mg/dL    Bun 10 8 - 22 mg/dL    Creatinine 0.58 0.50 - 1.40 mg/dL    Calcium 9.1 8.5 - 10.5 mg/dL    AST(SGOT) 66 (H) 12 - 45 U/L    ALT(SGPT) 71 (H) 2 - 50 U/L    Alkaline Phosphatase 394 (H) 30 - 99 U/L    Total Bilirubin 4.9 (H) 0.1 - 1.5 mg/dL    Albumin 4.0 3.2  - 4.9 g/dL    Total Protein 7.0 6.0 - 8.2 g/dL    Globulin 3.0 1.9 - 3.5 g/dL    A-G Ratio 1.3 g/dL   BLOOD CULTURE    Specimen: Peripheral; Blood   Result Value Ref Range    Significant Indicator NEG     Source BLD     Site PERIPHERAL     Culture Result       No Growth  Note: Blood cultures are incubated for 5 days and  are monitored continuously.Positive blood cultures  are called to the RN and reported as soon as  they are identified.     BLOOD CULTURE    Specimen: Peripheral; Blood   Result Value Ref Range    Significant Indicator NEG     Source BLD     Site PERIPHERAL     Culture Result       No Growth  Note: Blood cultures are incubated for 5 days and  are monitored continuously.Positive blood cultures  are called to the RN and reported as soon as  they are identified.     PROTHROMBIN TIME   Result Value Ref Range    PT 20.0 (H) 12.0 - 14.6 sec    INR 1.76 (H) 0.87 - 1.13   APTT   Result Value Ref Range    APTT 36.0 24.7 - 36.0 sec   TROPONIN   Result Value Ref Range    Troponin T 15 6 - 19 ng/L   MAGNESIUM   Result Value Ref Range    Magnesium 2.0 1.5 - 2.5 mg/dL   proBrain Natriuretic Peptide, NT   Result Value Ref Range    NT-proBNP 51 0 - 125 pg/mL   LACTIC ACID   Result Value Ref Range    Lactic Acid 5.8 (HH) 0.5 - 2.0 mmol/L   LACTIC ACID   Result Value Ref Range    Lactic Acid 4.8 (HH) 0.5 - 2.0 mmol/L   LACTIC ACID   Result Value Ref Range    Lactic Acid 2.1 (H) 0.5 - 2.0 mmol/L   BETA-HYDROXYBUTYRIC ACID   Result Value Ref Range    beta-Hydroxybutyric Acid 1.35 (H) 0.02 - 0.27 mmol/L   COV-2, FLU A/B, AND RSV BY PCR (2-4 HOURS CEPHEID): Collect NP swab in VTM    Specimen: Respirate   Result Value Ref Range    Influenza virus A RNA Negative Negative    Influenza virus B, PCR Negative Negative    RSV, PCR Negative Negative    SARS-CoV-2 by PCR NotDetected     SARS-CoV-2 Source NP Swab    CRP Quantitative (Non-Cardiac)   Result Value Ref Range    Stat C-Reactive Protein 0.53 0.00 - 0.75 mg/dL    Procalcitonin   Result Value Ref Range    Procalcitonin 0.29 (H) <0.25 ng/mL   URINALYSIS,CULTURE IF INDICATED    Specimen: Urine   Result Value Ref Range    Color Yellow     Character Clear     Specific Gravity 1.031 <1.035    Ph 5.0 5.0 - 8.0    Glucose >=1000 (A) Negative mg/dL    Ketones 15 (A) Negative mg/dL    Protein Negative Negative mg/dL    Bilirubin Negative Negative    Urobilinogen, Urine 0.2 Negative    Nitrite Negative Negative    Leukocyte Esterase Negative Negative    Occult Blood Negative Negative    Micro Urine Req see below    CREATINE KINASE   Result Value Ref Range    CPK Total 73 0 - 154 U/L   AMMONIA   Result Value Ref Range    Ammonia 70 (H) 11 - 45 umol/L   DIAGNOSTIC ALCOHOL   Result Value Ref Range    Diagnostic Alcohol 43.2 (H) 0.0 - 10.0 mg/dL   ESTIMATED GFR   Result Value Ref Range    GFR If African American >60 >60 mL/min/1.73 m 2    GFR If Non African American >60 >60 mL/min/1.73 m 2   DIFFERENTIAL MANUAL   Result Value Ref Range    Myelocytes 1.70 %    Manual Diff Status PERFORMED    PERIPHERAL SMEAR REVIEW   Result Value Ref Range    Peripheral Smear Review see below    PLATELET ESTIMATE   Result Value Ref Range    Plt Estimation Decreased    MORPHOLOGY   Result Value Ref Range    RBC Morphology Present     Polychromia 2+     Poikilocytosis 1+     Ovalocytes 1+    CBC with Differential   Result Value Ref Range    WBC 5.8 4.8 - 10.8 K/uL    RBC 3.20 (L) 4.70 - 6.10 M/uL    Hemoglobin 7.4 (L) 14.0 - 18.0 g/dL    Hematocrit 26.0 (L) 42.0 - 52.0 %    MCV 81.3 (L) 81.4 - 97.8 fL    MCH 23.1 (L) 27.0 - 33.0 pg    MCHC 28.5 (L) 33.7 - 35.3 g/dL    RDW 58.9 (H) 35.9 - 50.0 fL    Platelet Count 92 (L) 164 - 446 K/uL    MPV 10.8 9.0 - 12.9 fL    Neutrophils-Polys 57.20 44.00 - 72.00 %    Lymphocytes 18.30 (L) 22.00 - 41.00 %    Monocytes 20.00 (H) 0.00 - 13.40 %    Eosinophils 2.40 0.00 - 6.90 %    Basophils 1.60 0.00 - 1.80 %    Immature Granulocytes 0.50 0.00 - 0.90 %    Nucleated RBC  0.00 /100 WBC    Neutrophils (Absolute) 3.29 1.82 - 7.42 K/uL    Lymphs (Absolute) 1.05 1.00 - 4.80 K/uL    Monos (Absolute) 1.15 (H) 0.00 - 0.85 K/uL    Eos (Absolute) 0.14 0.00 - 0.51 K/uL    Baso (Absolute) 0.09 0.00 - 0.12 K/uL    Immature Granulocytes (abs) 0.03 0.00 - 0.11 K/uL    NRBC (Absolute) 0.00 K/uL   Comp Metabolic Panel   Result Value Ref Range    Sodium 132 (L) 135 - 145 mmol/L    Potassium 4.4 3.6 - 5.5 mmol/L    Chloride 99 96 - 112 mmol/L    Co2 17 (L) 20 - 33 mmol/L    Anion Gap 16.0 7.0 - 16.0    Glucose 441 (H) 65 - 99 mg/dL    Bun 9 8 - 22 mg/dL    Creatinine 0.38 (L) 0.50 - 1.40 mg/dL    Calcium 8.3 (L) 8.5 - 10.5 mg/dL    AST(SGOT) 53 (H) 12 - 45 U/L    ALT(SGPT) 55 (H) 2 - 50 U/L    Alkaline Phosphatase 318 (H) 30 - 99 U/L    Total Bilirubin 4.1 (H) 0.1 - 1.5 mg/dL    Albumin 3.3 3.2 - 4.9 g/dL    Total Protein 5.8 (L) 6.0 - 8.2 g/dL    Globulin 2.5 1.9 - 3.5 g/dL    A-G Ratio 1.3 g/dL   Phosphorus   Result Value Ref Range    Phosphorus 3.1 2.5 - 4.5 mg/dL   Magnesium   Result Value Ref Range    Magnesium 1.7 1.5 - 2.5 mg/dL   Basic Metabolic Panel (BMP)   Result Value Ref Range    Sodium 131 (L) 135 - 145 mmol/L    Potassium 4.0 3.6 - 5.5 mmol/L    Chloride 100 96 - 112 mmol/L    Co2 21 20 - 33 mmol/L    Glucose 319 (H) 65 - 99 mg/dL    Bun 9 8 - 22 mg/dL    Creatinine 0.33 (L) 0.50 - 1.40 mg/dL    Calcium 8.2 (L) 8.5 - 10.5 mg/dL    Anion Gap 10.0 7.0 - 16.0   PERIPHERAL SMEAR REVIEW   Result Value Ref Range    Peripheral Smear Review see below    ESTIMATED GFR   Result Value Ref Range    GFR If African American >60 >60 mL/min/1.73 m 2    GFR If Non African American >60 >60 mL/min/1.73 m 2   ESTIMATED GFR   Result Value Ref Range    GFR If African American >60 >60 mL/min/1.73 m 2    GFR If Non African American >60 >60 mL/min/1.73 m 2   Basic Metabolic Panel (BMP)   Result Value Ref Range    Sodium 132 (L) 135 - 145 mmol/L    Potassium 3.5 (L) 3.6 - 5.5 mmol/L    Chloride 99 96 - 112  mmol/L    Co2 23 20 - 33 mmol/L    Glucose 211 (H) 65 - 99 mg/dL    Bun 10 8 - 22 mg/dL    Creatinine 0.31 (L) 0.50 - 1.40 mg/dL    Calcium 7.8 (L) 8.5 - 10.5 mg/dL    Anion Gap 10.0 7.0 - 16.0   CBC WITHOUT DIFFERENTIAL   Result Value Ref Range    WBC 6.9 4.8 - 10.8 K/uL    RBC 3.14 (L) 4.70 - 6.10 M/uL    Hemoglobin 7.4 (L) 14.0 - 18.0 g/dL    Hematocrit 25.0 (L) 42.0 - 52.0 %    MCV 79.6 (L) 81.4 - 97.8 fL    MCH 23.6 (L) 27.0 - 33.0 pg    MCHC 29.6 (L) 33.7 - 35.3 g/dL    RDW 57.9 (H) 35.9 - 50.0 fL    Platelet Count 88 (L) 164 - 446 K/uL    MPV 9.8 9.0 - 12.9 fL   ESTIMATED GFR   Result Value Ref Range    GFR If African American >60 >60 mL/min/1.73 m 2    GFR If Non African American >60 >60 mL/min/1.73 m 2   Prothrombin Time   Result Value Ref Range    PT 19.9 (H) 12.0 - 14.6 sec    INR 1.75 (H) 0.87 - 1.13   Basic Metabolic Panel (BMP)   Result Value Ref Range    Sodium 130 (L) 135 - 145 mmol/L    Potassium 3.3 (L) 3.6 - 5.5 mmol/L    Chloride 99 96 - 112 mmol/L    Co2 22 20 - 33 mmol/L    Glucose 230 (H) 65 - 99 mg/dL    Bun 9 8 - 22 mg/dL    Creatinine 0.29 (L) 0.50 - 1.40 mg/dL    Calcium 7.3 (L) 8.5 - 10.5 mg/dL    Anion Gap 9.0 7.0 - 16.0   Magnesium   Result Value Ref Range    Magnesium 1.5 1.5 - 2.5 mg/dL   Phosphorus   Result Value Ref Range    Phosphorus 2.6 2.5 - 4.5 mg/dL   CBC WITHOUT DIFFERENTIAL   Result Value Ref Range    WBC 6.6 4.8 - 10.8 K/uL    RBC 3.23 (L) 4.70 - 6.10 M/uL    Hemoglobin 7.6 (L) 14.0 - 18.0 g/dL    Hematocrit 25.7 (L) 42.0 - 52.0 %    MCV 79.6 (L) 81.4 - 97.8 fL    MCH 23.5 (L) 27.0 - 33.0 pg    MCHC 29.6 (L) 33.7 - 35.3 g/dL    RDW 58.1 (H) 35.9 - 50.0 fL    Platelet Count 84 (L) 164 - 446 K/uL    MPV 10.0 9.0 - 12.9 fL   ESTIMATED GFR   Result Value Ref Range    GFR If African American >60 >60 mL/min/1.73 m 2    GFR If Non African American >60 >60 mL/min/1.73 m 2   EKG   Result Value Ref Range    Report       [facility] Emergency Dept.    Test Date:   2021  Pt Name:    TONG STAPLETON          Department: ER  MRN:        [id number]                      Room:         Gender:     Male                         Technician: [id number]  :        [id number]                   Requested By:MIGUEL CHANDRA  Order #:    [id number]                    Reading MD: Miguel Chandra MD    Measurements  Intervals                                Axis  Rate:       108                          P:          45  MS:         125                          QRS:        30  QRSD:       106                          T:          27  QT:         348  QTc:        467    Interpretive Statements  SINUS TACHYCARDIA  LEFT ATRIAL ABNORMALITY  BORDERLINE INTRAVENTRICULAR CONDUCTION DELAY  Compared to ECG 11/10/2021 05:36:56  Atrial abnormality now present  Sinus rhythm no longer present  Prolonged QT interval no longer present  Electronically Signed On 2021 20:18:55 PST by Miguel Chandra MD     POCT glucose device results   Result Value Ref Range    Glucose - Accu-Ck 541 (HH) 65 - 99 mg/dL   POCT glucose device results   Result Value Ref Range    Glucose - Accu-Ck 412 (HH) 65 - 99 mg/dL   POCT glucose device results   Result Value Ref Range    Glucose - Accu-Ck 333 (H) 65 - 99 mg/dL   POCT glucose device results   Result Value Ref Range    Glucose - Accu-Ck 330 (H) 65 - 99 mg/dL   POCT glucose device results   Result Value Ref Range    Glucose - Accu-Ck 345 (H) 65 - 99 mg/dL   POCT glucose device results   Result Value Ref Range    Glucose - Accu-Ck 260 (H) 65 - 99 mg/dL   POCT glucose device results   Result Value Ref Range    Glucose - Accu-Ck 193 (H) 65 - 99 mg/dL   POCT glucose device results   Result Value Ref Range    Glucose - Accu-Ck 203 (H) 65 - 99 mg/dL   POCT glucose device results   Result Value Ref Range    Glucose - Accu-Ck 302 (H) 65 - 99 mg/dL   POCT glucose device results   Result Value Ref Range    Glucose - Accu-Ck 309 (H) 65 - 99 mg/dL   POCT glucose device results    Result Value Ref Range    Glucose - Accu-Ck 207 (H) 65 - 99 mg/dL       RADIOLOGY  DX-CHEST-PORTABLE (1 VIEW)   Final Result      1.  Hypoventilatory changes without other evidence for acute cardiopulmonary abnormality.          COURSE & MEDICAL DECISION MAKING    This is a 46 y.o. male who presents with fatigue polyuria and polydipsia, elevated blood sugar in the field.    Differential Diagnosis includes but is not limited to:  DKA, HHS, medication reaction, sepsis, cirrhosis    ED Course:  Unfortunate 46-year-old male with above concerning presentation.  Plan on EKG labs and chest x-ray.  Bedside ultrasound shows no obvious ascites doubt SBP.  Unfortunately labs are concerning for new onset diabetes with diabetic ketoacidosis, I have ordered aggressive IV fluid rehydration, and after discussion with intensivist and clinical pharmacist plan to initiate an insulin infusion without bolus.  Potassium stable.  No obvious source of infection, patient hemodynamically stable for transfer to the ICU in critical condition.  Lactic acidosis present likely due to liver disease and acute metabolic derangement doubt sepsis.    Upon my evaluation, this patient had a high probability of imminent or life-threatening deterioration due to DKA, high anion gap metabolic acidosis, lactic acidosis.     I personally provided 35 minutes of total critical care time outside of time spent on separately billable/documented procedures. This required my direct attention, intervention, and management which included the following:  -review of laboratory data  -review of radiology studies  -discussion with consultants  -discussion with family/patient  -monitoring for potential decompensation  -Aggressive IV fluid rehydration and continuous insulin infusion      Medications   D10%-0.45% NaCl infusion (0 mL Intravenous Held 11/22/21 0445)   lactated ringers infusion (0 mL Intravenous Stopped 11/22/21 1100)   D5LR infusion (0 mL Intravenous Held  11/22/21 0445)   insulin regular human (HUMULIN/NOVOLIN R) 62.5 Units in  mL Infusion for DKA (0 Units/hr Intravenous Stopped. (Insulin or Heparin) 11/22/21 1031)   lactated ringers (LR) bolus (0 mL Intravenous Stopped 11/22/21 0525)   lactated ringers infusion (BOLUS) (0 mL Intravenous Stopped 11/22/21 0525)   magnesium sulfate IVPB premix 2 g (0 g Intravenous Stopped 11/22/21 0844)     Or   magnesium sulfate IVPB premix 4 g ( Intravenous See Alternative 11/22/21 0844)   potassium chloride (KCL) ivpb 10 mEq (0 mEq Intravenous Stopped 11/22/21 0644)   potassium chloride (KCL) ivpb 10 mEq (0 mEq Intravenous Stopped 11/22/21 1202)   phytonadione (MEPHYTON) tablet 10 mg (10 mg Oral Given 11/23/21 0615)   potassium chloride SA (Kdur) tablet 40 mEq (40 mEq Oral Given 11/23/21 0603)       FINAL IMPRESSION  1. Diabetic ketoacidosis without coma associated with type 2 diabetes mellitus (HCC)    2. Hyponatremia    3. Coagulopathy (HCC)    4. Thrombocytopenia (HCC)    5. Diabetes mellitus, new onset (HCC)    6. Alcoholic cirrhosis of liver without ascites (HCC)    7. Dehydration    8. High anion gap metabolic acidosis    9. Lactic acidosis        -ADMIT-       Pertinent Labs & Imaging studies reviewed and verified by myself, as well as nursing notes and the patient's past medical, family, and social histories (See chart for details).    Portions of this record were made with voice recognition software.  Despite my review, spelling/grammar/context errors may still remain.  Interpretation of this chart should be taken in this context.    Electronically signed by Miguel Jo M.D. on 11/25/2021 at 8:20 PM.

## 2021-11-22 NOTE — ED NOTES
Lab called with critical result of lactic of 5.8 and glucose of 634. Critical lab result read back.   ERP notified of critical lab result. Critical lab result read back by ERP.

## 2021-11-22 NOTE — DISCHARGE PLANNING
Care Transition Team Discharge Planning    Anticipated Discharge Disposition: TBD     Action: Lsw attended rounds, pt no longer needs ICU level of care, and will transfer from [facility] to hospitalist then to a lower level of care unit.      Barriers to Discharge: no longer critical, but still acute level care needs    Plan: [facility] d/c team will continue to follow, and assist with d/c planning.

## 2021-11-22 NOTE — PROGRESS NOTES
Discussed plan of care in interdisciplinary rounds. Discussed transitioning patient off DKA protocol and encouraging a bowel movement with lactulose. If tolerating patient will transfer to medical later today.

## 2021-11-22 NOTE — ASSESSMENT & PLAN NOTE
No signs of decompensation at present  Abd distended but denies any pain  MV, thiamine and supportive care  Rifaximin, start lactulose when able

## 2021-11-22 NOTE — ED TRIAGE NOTES
"Chief Complaint   Patient presents with   • Hyperglycemia     BIBA for \"high\" glucose reading by EMS. Pt recently prescribed metformin but denies being diagnosed with DM. Reports increased thirst, hunger and urination starting today. Was given 500 ml bolus by ems.   • Polyuria   • Polydipsia   • Fall     Pt reports GLF today and yesterday with increasing weakness          Vitals:    11/22/21 0259   BP: 144/83   Pulse: (!) 118   Resp: 19   Temp: 36.2 °C (97.1 °F)   SpO2: 95%       "

## 2021-11-22 NOTE — CONSULTS
Critical Care Consultation    Date of consult: 11/22/2021    Referring Physician  Miguel Jo M.D.    Reason for Consultation  DKA    History of Presenting Illness  46 y.o. male with etoh cirrhosis, etoh hepatitis and prior variceal bleeding who presented 11/22/2021 with new onset DKA in the setting of recent corticosteroids use. He notes poluria, polydipsia and fatigue over the past few days. Denies any other symptoms other than leg cramps. Denies any recent hematemesis, chest pain, SOB or infectious symptoms.    Code Status  Prior    Review of Systems  Review of Systems   Constitutional: Positive for malaise/fatigue. Negative for chills and fever.   HENT: Negative.    Eyes: Negative.    Respiratory: Negative for shortness of breath.    Cardiovascular: Negative for chest pain.   Gastrointestinal: Negative.    Genitourinary: Positive for frequency. Negative for dysuria.   Musculoskeletal: Negative.    Skin: Negative.    Neurological: Negative.    Endo/Heme/Allergies: Negative.    Psychiatric/Behavioral: Negative.        Past Medical History   has no past medical history of ASTHMA or Diabetes.    Surgical History   has a past surgical history that includes pr upper gi endoscopy,diagnosis (N/A, 11/10/2021).    Family History  family history includes Diabetes in his father; Heart Attack in his maternal grandfather; Lung Disease in his mother; Other in his brother, brother, and mother.    Social History   reports that he has been smoking cigarettes. He has a 20.00 pack-year smoking history. He quit smokeless tobacco use about 9 years ago.  His smokeless tobacco use included chew. He reports previous alcohol use of about 21.0 oz of alcohol per week. He reports current drug use. Drug: Marijuana.    Medications  Home Medications    **Home medications have not yet been reviewed for this encounter**       No current facility-administered medications for this encounter.     Current Outpatient Medications   Medication  Sig Dispense Refill   • omeprazole (PRILOSEC) 20 MG delayed-release capsule Take 1 Capsule by mouth 2 times a day. 60 Capsule 0   • sucralfate (CARAFATE) 1 GM/10ML Suspension Take 10 mL by mouth every 8 hours. 414 mL 0   • metFORMIN (GLUCOPHAGE) 500 MG Tab Take 1 Tablet by mouth 2 times a day with meals. 60 Tablet 0   • [START ON 12/9/2021] methylPREDNISolone (MEDROL DOSEPAK) 4 MG Tablet Therapy Pack Follow schedule on package instructions. 21 Tablet 0       Allergies  No Known Allergies    Vital Signs last 24 hours  Temp:  [36.2 °C (97.1 °F)] 36.2 °C (97.1 °F)  Pulse:  [107-118] 107  Resp:  [17-19] 17  BP: (144-148)/(68-83) 148/68  SpO2:  [93 %-95 %] 93 %    Physical Exam  Physical Exam  Constitutional:       Comments: Jaundiced   HENT:      Head: Normocephalic and atraumatic.      Comments: Small bruise right forehead     Mouth/Throat:      Mouth: Mucous membranes are dry.      Pharynx: Oropharynx is clear.   Eyes:      General: Scleral icterus present.      Extraocular Movements: Extraocular movements intact.      Pupils: Pupils are equal, round, and reactive to light.   Cardiovascular:      Rate and Rhythm: Regular rhythm. Tachycardia present.      Pulses: Normal pulses.   Pulmonary:      Effort: No respiratory distress.   Abdominal:      General: Abdomen is flat. Bowel sounds are normal. There is distension.      Tenderness: There is no abdominal tenderness.   Musculoskeletal:      Cervical back: Normal range of motion.      Right lower leg: Edema present.      Left lower leg: Edema present.   Skin:     General: Skin is warm and dry.      Capillary Refill: Capillary refill takes 2 to 3 seconds.   Neurological:      General: No focal deficit present.      Mental Status: He is alert and oriented to person, place, and time. Mental status is at baseline.      Cranial Nerves: No cranial nerve deficit.      Motor: No weakness.         Fluids  No intake or output data in the 24 hours ending 11/22/21  0411    Laboratory  Recent Results (from the past 48 hour(s))   CBC WITH DIFFERENTIAL    Collection Time: 11/22/21  3:02 AM   Result Value Ref Range    WBC 7.0 4.8 - 10.8 K/uL    RBC 3.72 (L) 4.70 - 6.10 M/uL    Hemoglobin 8.8 (L) 14.0 - 18.0 g/dL    Hematocrit 30.3 (L) 42.0 - 52.0 %    MCV 81.5 81.4 - 97.8 fL    MCH 23.7 (L) 27.0 - 33.0 pg    MCHC 29.0 (L) 33.7 - 35.3 g/dL    RDW 59.6 (H) 35.9 - 50.0 fL    Platelet Count 113 (L) 164 - 446 K/uL    MPV 11.0 9.0 - 12.9 fL    Neutrophils-Polys 63.50 44.00 - 72.00 %    Lymphocytes 14.80 (L) 22.00 - 41.00 %    Monocytes 8.70 0.00 - 13.40 %    Eosinophils 8.70 (H) 0.00 - 6.90 %    Basophils 2.60 (H) 0.00 - 1.80 %    Nucleated RBC 0.30 /100 WBC    Neutrophils (Absolute) 4.45 1.82 - 7.42 K/uL    Lymphs (Absolute) 1.04 1.00 - 4.80 K/uL    Monos (Absolute) 0.61 0.00 - 0.85 K/uL    Eos (Absolute) 0.61 (H) 0.00 - 0.51 K/uL    Baso (Absolute) 0.18 (H) 0.00 - 0.12 K/uL    NRBC (Absolute) 0.02 K/uL    Hypochromia 2+ (A)     Anisocytosis 1+     Macrocytosis 1+    COMP METABOLIC PANEL    Collection Time: 11/22/21  3:02 AM   Result Value Ref Range    Sodium 127 (L) 135 - 145 mmol/L    Potassium 4.5 3.6 - 5.5 mmol/L    Chloride 93 (L) 96 - 112 mmol/L    Co2 14 (L) 20 - 33 mmol/L    Anion Gap 20.0 (H) 7.0 - 16.0    Glucose 634 (HH) 65 - 99 mg/dL    Bun 10 8 - 22 mg/dL    Creatinine 0.58 0.50 - 1.40 mg/dL    Calcium 9.1 8.5 - 10.5 mg/dL    AST(SGOT) 66 (H) 12 - 45 U/L    ALT(SGPT) 71 (H) 2 - 50 U/L    Alkaline Phosphatase 394 (H) 30 - 99 U/L    Total Bilirubin 4.9 (H) 0.1 - 1.5 mg/dL    Albumin 4.0 3.2 - 4.9 g/dL    Total Protein 7.0 6.0 - 8.2 g/dL    Globulin 3.0 1.9 - 3.5 g/dL    A-G Ratio 1.3 g/dL   BETA-HYDROXYBUTYRIC ACID    Collection Time: 11/22/21  3:02 AM   Result Value Ref Range    beta-Hydroxybutyric Acid 1.35 (H) 0.02 - 0.27 mmol/L   ESTIMATED GFR    Collection Time: 11/22/21  3:02 AM   Result Value Ref Range    GFR If African American >60 >60 mL/min/1.73 m 2    GFR If Non  African American >60 >60 mL/min/1.73 m 2   DIFFERENTIAL MANUAL    Collection Time: 11/22/21  3:02 AM   Result Value Ref Range    Myelocytes 1.70 %    Manual Diff Status PERFORMED    PERIPHERAL SMEAR REVIEW    Collection Time: 11/22/21  3:02 AM   Result Value Ref Range    Peripheral Smear Review see below    PLATELET ESTIMATE    Collection Time: 11/22/21  3:02 AM   Result Value Ref Range    Plt Estimation Decreased    MORPHOLOGY    Collection Time: 11/22/21  3:02 AM   Result Value Ref Range    RBC Morphology Present     Polychromia 2+     Poikilocytosis 1+     Ovalocytes 1+    POCT glucose device results    Collection Time: 11/22/21  3:06 AM   Result Value Ref Range    Glucose - Accu-Ck 541 (HH) 65 - 99 mg/dL   URINALYSIS,CULTURE IF INDICATED    Collection Time: 11/22/21  3:07 AM    Specimen: Urine   Result Value Ref Range    Color Yellow     Character Clear     Specific Gravity 1.031 <1.035    Ph 5.0 5.0 - 8.0    Glucose >=1000 (A) Negative mg/dL    Ketones 15 (A) Negative mg/dL    Protein Negative Negative mg/dL    Bilirubin Negative Negative    Urobilinogen, Urine 0.2 Negative    Nitrite Negative Negative    Leukocyte Esterase Negative Negative    Occult Blood Negative Negative    Micro Urine Req see below        Imaging  DX-CHEST-PORTABLE (1 VIEW)   Final Result      1.  Hypoventilatory changes without other evidence for acute cardiopulmonary abnormality.          Assessment/Plan  DKA (diabetic ketoacidosis) (HCC)  Assessment & Plan  Very mild, anticipate that this will correct rather quickly  ICU admission, cardiac monitoring  Optimize intravascular volume with IVF bolus  DKA protocol with insulin drip at 0.05 units/kg/hr  Every hour Accu-Cheks, every 4 hour BMP, mag, phos  Goal Magnesium: >2, Phosphorus: 2, K >4  Will transition to sliding scale insulin and sub q insulin when anion gap <12, CO2 > 17      Lactate blood increase  Assessment & Plan  Likely poor clearance and dehydration, no infectious  signs  Thiamin and volume repletion  Can check another in 2 hours but unlikely to change clinical treatment plan      Cirrhosis (HCC)  Assessment & Plan  No signs of decompensation at present  Abd distended but denies any pain  MV, thiamine and supportive care  Rifaximin, start lactulose when able    Thrombocytopenia (HCC)- (present on admission)  Assessment & Plan  Likely 2/2 to cirrhosis  No signs of active bleeding   monitor      Discussed patient condition and risk of morbidity and/or mortality with RN, RT, Pharmacy, Code status disscussed, Charge nurse / hot rounds and Patient.    The patient remains critically ill.  Critical care time = 40 minutes in directly providing and coordinating critical care and extensive data review.  No time overlap and excludes procedures.

## 2021-11-23 ENCOUNTER — PHARMACY VISIT (OUTPATIENT)
Dept: PHARMACY | Facility: MEDICAL CENTER | Age: 46
End: 2021-11-23
Payer: COMMERCIAL

## 2021-11-23 VITALS
HEIGHT: 72 IN | RESPIRATION RATE: 17 BRPM | TEMPERATURE: 97.1 F | BODY MASS INDEX: 29.11 KG/M2 | SYSTOLIC BLOOD PRESSURE: 133 MMHG | OXYGEN SATURATION: 95 % | WEIGHT: 214.95 LBS | HEART RATE: 62 BPM | DIASTOLIC BLOOD PRESSURE: 42 MMHG

## 2021-11-23 PROBLEM — R79.89 LACTATE BLOOD INCREASE: Status: RESOLVED | Noted: 2021-11-22 | Resolved: 2021-11-23

## 2021-11-23 PROBLEM — E11.10 DKA (DIABETIC KETOACIDOSIS) (HCC): Status: RESOLVED | Noted: 2021-11-22 | Resolved: 2021-11-23

## 2021-11-23 LAB
ANION GAP SERPL CALC-SCNC: 9 MMOL/L (ref 7–16)
BUN SERPL-MCNC: 9 MG/DL (ref 8–22)
CALCIUM SERPL-MCNC: 7.3 MG/DL (ref 8.5–10.5)
CHLORIDE SERPL-SCNC: 99 MMOL/L (ref 96–112)
CO2 SERPL-SCNC: 22 MMOL/L (ref 20–33)
CREAT SERPL-MCNC: 0.29 MG/DL (ref 0.5–1.4)
ERYTHROCYTE [DISTWIDTH] IN BLOOD BY AUTOMATED COUNT: 58.1 FL (ref 35.9–50)
GLUCOSE BLD-MCNC: 207 MG/DL (ref 65–99)
GLUCOSE BLD-MCNC: 309 MG/DL (ref 65–99)
GLUCOSE SERPL-MCNC: 230 MG/DL (ref 65–99)
HCT VFR BLD AUTO: 25.7 % (ref 42–52)
HGB BLD-MCNC: 7.6 G/DL (ref 14–18)
INR PPP: 1.75 (ref 0.87–1.13)
MAGNESIUM SERPL-MCNC: 1.5 MG/DL (ref 1.5–2.5)
MCH RBC QN AUTO: 23.5 PG (ref 27–33)
MCHC RBC AUTO-ENTMCNC: 29.6 G/DL (ref 33.7–35.3)
MCV RBC AUTO: 79.6 FL (ref 81.4–97.8)
PHOSPHATE SERPL-MCNC: 2.6 MG/DL (ref 2.5–4.5)
PLATELET # BLD AUTO: 84 K/UL (ref 164–446)
PMV BLD AUTO: 10 FL (ref 9–12.9)
POTASSIUM SERPL-SCNC: 3.3 MMOL/L (ref 3.6–5.5)
PROTHROMBIN TIME: 19.9 SEC (ref 12–14.6)
RBC # BLD AUTO: 3.23 M/UL (ref 4.7–6.1)
SODIUM SERPL-SCNC: 130 MMOL/L (ref 135–145)
WBC # BLD AUTO: 6.6 K/UL (ref 4.8–10.8)

## 2021-11-23 PROCEDURE — 99239 HOSP IP/OBS DSCHRG MGMT >30: CPT | Performed by: HOSPITALIST

## 2021-11-23 PROCEDURE — RXMED WILLOW AMBULATORY MEDICATION CHARGE: Performed by: HOSPITALIST

## 2021-11-23 PROCEDURE — A9270 NON-COVERED ITEM OR SERVICE: HCPCS | Performed by: PSYCHIATRY & NEUROLOGY

## 2021-11-23 PROCEDURE — A9270 NON-COVERED ITEM OR SERVICE: HCPCS | Performed by: HOSPITALIST

## 2021-11-23 PROCEDURE — 82962 GLUCOSE BLOOD TEST: CPT

## 2021-11-23 PROCEDURE — 80048 BASIC METABOLIC PNL TOTAL CA: CPT

## 2021-11-23 PROCEDURE — 700102 HCHG RX REV CODE 250 W/ 637 OVERRIDE(OP): Performed by: HOSPITALIST

## 2021-11-23 PROCEDURE — 83735 ASSAY OF MAGNESIUM: CPT

## 2021-11-23 PROCEDURE — 84100 ASSAY OF PHOSPHORUS: CPT

## 2021-11-23 PROCEDURE — 85610 PROTHROMBIN TIME: CPT

## 2021-11-23 PROCEDURE — 85027 COMPLETE CBC AUTOMATED: CPT

## 2021-11-23 PROCEDURE — 700102 HCHG RX REV CODE 250 W/ 637 OVERRIDE(OP): Performed by: PSYCHIATRY & NEUROLOGY

## 2021-11-23 PROCEDURE — 700102 HCHG RX REV CODE 250 W/ 637 OVERRIDE(OP): Performed by: STUDENT IN AN ORGANIZED HEALTH CARE EDUCATION/TRAINING PROGRAM

## 2021-11-23 RX ORDER — LANCETS 30 GAUGE
EACH MISCELLANEOUS
Qty: 100 EACH | Refills: 5 | Status: SHIPPED | OUTPATIENT
Start: 2021-11-23

## 2021-11-23 RX ORDER — GLUCOSAMINE HCL/CHONDROITIN SU 500-400 MG
CAPSULE ORAL
Qty: 100 EACH | Refills: 5 | Status: SHIPPED | OUTPATIENT
Start: 2021-11-23

## 2021-11-23 RX ORDER — INSULIN LISPRO 100 [IU]/ML
INJECTION, SOLUTION INTRAVENOUS; SUBCUTANEOUS
Qty: 15 ML | Refills: 5 | Status: SHIPPED | OUTPATIENT
Start: 2021-11-23

## 2021-11-23 RX ORDER — MAGNESIUM SULFATE HEPTAHYDRATE 40 MG/ML
4 INJECTION, SOLUTION INTRAVENOUS ONCE
Status: DISCONTINUED | OUTPATIENT
Start: 2021-11-23 | End: 2021-11-23 | Stop reason: HOSPADM

## 2021-11-23 RX ORDER — BLOOD-GLUCOSE METER
KIT MISCELLANEOUS
Qty: 1 KIT | Refills: 0 | Status: SHIPPED | OUTPATIENT
Start: 2021-11-23

## 2021-11-23 RX ORDER — POTASSIUM CHLORIDE 20 MEQ/1
40 TABLET, EXTENDED RELEASE ORAL ONCE
Status: COMPLETED | OUTPATIENT
Start: 2021-11-23 | End: 2021-11-23

## 2021-11-23 RX ADMIN — Medication 100 MG: at 06:04

## 2021-11-23 RX ADMIN — POTASSIUM CHLORIDE 40 MEQ: 1500 TABLET, EXTENDED RELEASE ORAL at 06:03

## 2021-11-23 RX ADMIN — PHYTONADIONE 10 MG: 5 TABLET ORAL at 06:15

## 2021-11-23 RX ADMIN — THERA TABS 1 TABLET: TAB at 06:04

## 2021-11-23 RX ADMIN — INSULIN GLARGINE 20 UNITS: 100 INJECTION, SOLUTION SUBCUTANEOUS at 06:16

## 2021-11-23 RX ADMIN — OMEPRAZOLE 20 MG: 20 CAPSULE, DELAYED RELEASE ORAL at 06:03

## 2021-11-23 RX ADMIN — INSULIN LISPRO 3 UNITS: 100 INJECTION, SOLUTION INTRAVENOUS; SUBCUTANEOUS at 06:16

## 2021-11-23 RX ADMIN — GABAPENTIN 100 MG: 100 CAPSULE ORAL at 06:04

## 2021-11-23 NOTE — PROGRESS NOTES
4 Eyes Skin Assessment Completed by Asmita GAMEZ, ANA and ANA Fuentes.    Head WDL  Ears WDL  Nose WDL  Mouth WDL  Neck WDL  Breast/Chest WDL  Shoulder Blades WDL  Spine WDL  (R) Arm/Elbow/Hand WDL  (L) Arm/Elbow/Hand WDL  Abdomen WDL  Groin WDL  Scrotum/Coccyx/Buttocks WDL  (R) Leg WDL  (L) Leg WDL  (R) Heel/Foot/Toe Dry, calloused  (L) Heel/Foot/Toe Dry, calloused    Devices In Places PIV       Interventions In Place Pressure Redistribution Mattress    Possible Skin Injury No    Pictures Uploaded Into Epic N/A  Wound Consult Placed N/A  RN Wound Prevention Protocol Ordered No

## 2021-11-23 NOTE — ASSESSMENT & PLAN NOTE
States last drink was over three weeks ago  PPI and carafate  Coagulopathy with elevated INR  Elevated ammonia and on lactulose, A+Ox3  Start lasix and aldactone if signs of ascites

## 2021-11-23 NOTE — PROGRESS NOTES
Diabetes education: Met with pt this afternoon. Please see consult note.  Plan:  Asked that pt give his insulin and do finger stick with nursing. CDE will follow up tomorrow to teach insulin pens and meters. Please order Lantus solostar pen, ( box of five or at least a month's supply) Humalog kwik pens ( box of five or at least a month's supply), with sliding scale written out and for ac only, baldev pen needles, One touch verio flex meter, test strips and lancets. Please use dm order set, F2 for correct meter, supplies and dx code. Please send to [facility] [place] so correct supplies can be obtained.

## 2021-11-23 NOTE — ASSESSMENT & PLAN NOTE
Insulin drip in ICU but transitioned off  Question if more insulin deficit from pancreatic damage from long term EtOH use.  Diabetes education  Monitor accuchecks and cover with SSI

## 2021-11-23 NOTE — PROGRESS NOTES
Assumed pt care at 0700. Received report from Radha PEREZ. A&O x4. Pt denies pain at this time.  Updated on POC, communication board updated. Bed locked and in lowest position. Call light and belongings within reach. Non-skid socks in place. Needs met, will continue to monitor.

## 2021-11-23 NOTE — CONSULTS
Diabetes education: Pt is newly dx with diabetes ( per MD from last visit 11/9 - 11/11 he was diagnosed then and started on Metformin), admitted with blood sugar of 634 and Hga1c of 7.1% ( from 11/11/21). Pt has hx of previous alcohol use of 21.0 oz per week. 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 Pt was started on Medrol dose marko last admission as well).  Pt is currently on Semglee 20 units in AM with Admelog sliding scale coverage ac and hs with blood sugars of 203  ( 3 units) and not yet documented or downloaded ( 6 units). Pt was to have given his insulin and done finger stick with nursing for pm dose.  Met with pt who states he was never told he had diabetes. Pt admits to being scared with all that happened and is now ready to stop drinking ( did discuss effects of alcohol on blood sugars).Discussed what diabetes was,  type two, hypoglycemia,   hyperglycemia, DKA, sick day, and goals for blood sugars.  Discussed need for carbohydrates and proteins, with every meal and why.  Discussed need, benefit and precautions with exercise.  Discussed  what effects blood sugars.Discussed stress management and pt states he will not need assistance with stopping alcohol. Discussed need to follow up with his PCP and needs and medication can change once he feels better.  Insulin was discussed briefly ( as well as Metformin).  Plan:  Asked that pt give his insulin and do finger stick with nursing. CDE will follow up tomorrow to teach insulin pens and meters. Please order Lantus solostar pen, ( box of five or at least a month's supply) Humalog kwik pens ( box of five or at least a month's supply), with sliding scale written out and for ac only, baldev pen needles, One touch verio flex meter, test strips and lancets. Please use dm order set, F2 for correct meter, supplies and dx code. [facility] send to [facility] [place] so correct supplies can be obtained.

## 2021-11-23 NOTE — PROGRESS NOTES
COVID-19 surge in effect.    Pt alert/oriented x4, no report of pain. Insulin given for elevated blood sugars per MAR. Pt able to demonstrate obtaining blood sample for glucometer and able to self administer insulin. Pt resting quietly in bed. Safety precautions and hourly rounding in place.

## 2021-11-23 NOTE — DISCHARGE INSTRUCTIONS
Discharge Instructions    Discharged to home by car with relative. Discharged via wheelchair, hospital escort: Yes.  Special equipment needed: Not Applicable    Be sure to schedule a follow-up appointment with your primary care doctor or any specialists as instructed.     Discharge Plan:        I understand that a diet low in cholesterol, fat, and sodium is recommended for good health. Unless I have been given specific instructions below for another diet, I accept this instruction as my diet prescription.   Other diet: diabetic    Special Instructions:     Discharge Instructions per Enrique Aggarwal D.O.    DIET: Healthy balanced diabetic diet with avoidance of alcohol    ACTIVITY: as tolerates    DIAGNOSIS: Diabetic ketoacidosis    Return to ER if needed      · Is patient discharged on Warfarin / Coumadin?   No     Depression / Suicide Risk    As you are discharged from this [facility] facility, it is important to learn how to keep safe from harming yourself.    Recognize the warning signs:  · Abrupt changes in personality, positive or negative- including increase in energy   · Giving away possessions  · Change in eating patterns- significant weight changes-  positive or negative  · Change in sleeping patterns- unable to sleep or sleeping all the time   · Unwillingness or inability to communicate  · Depression  · Unusual sadness, discouragement and loneliness  · Talk of wanting to die  · Neglect of personal appearance   · Rebelliousness- reckless behavior  · Withdrawal from people/activities they love  · Confusion- inability to concentrate     If you or a loved one observes any of these behaviors or has concerns about self-harm, here's what you can do:  · Talk about it- your feelings and reasons for harming yourself  · Remove any means that you might use to hurt yourself (examples: pills, rope, extension cords, firearm)  · Get professional help from the community (Mental Health, Substance Abuse, psychological  counseling)  · Do not be alone:Call your Safe Contact- someone whom you trust who will be there for you.  · Call your local CRISIS HOTLINE 458-0755 or 330-571-1781  · Call your local Children's Mobile Crisis Response Team [place] (932) 857-9221 or www.Brightkit  · Call the toll free National Suicide Prevention Hotlines   · National Suicide Prevention Lifeline 268-400-ULWK (1488)  · National Hope Line Network 800-SUICIDE (058-9581)

## 2021-11-23 NOTE — CARE PLAN
The patient is Stable - Low risk of patient condition declining or worsening    Shift Goals  Clinical Goals: monitor blood glucose and electrolytes    Progress made toward(s) clinical / shift goals:    Problem: Knowledge Deficit - Standard  Goal: Patient and family/care givers will demonstrate understanding of plan of care, disease process/condition, diagnostic tests and medications  Outcome: Progressing  Note: Diabetes educator came and spoke with the patient. Nurse will reinforce education. Patient updated on plan of care.       Problem: Skin Integrity  Goal: Skin integrity is maintained or improved  Outcome: Progressing  Note: Patient turns independently. Skin clean, dry, and intact.

## 2021-11-23 NOTE — DISCHARGE SUMMARY
"Discharge Summary    CHIEF COMPLAINT ON ADMISSION  Chief Complaint   Patient presents with   • Hyperglycemia     BIBA for \"high\" glucose reading by EMS. Pt recently prescribed metformin but denies being diagnosed with DM. Reports increased thirst, hunger and urination starting today. Was given 500 ml bolus by ems.   • Polyuria   • Polydipsia   • Fall     Pt reports GLF today and yesterday with increasing weakness        Reason for Admission  EMS     Admission Date  11/22/2021    CODE STATUS  Full Code    HPI & HOSPITAL COURSE  Enrique Moreno is 46 y.o. male with alcoholic cirrhosis, prior variceal bleeding, recent A1c:7.1, asthma who presented 11/22/2021 with hyperglycemia recent polydipsia and polyuria with concerns of checking his blood sugars which showed \"high.\"  He was admitted to the intensive care with a diagnosis of diabetic ketoacidosis.  He had recently been using steroids.  He had an elevated lactic acid of 5.8, and NH3:70 on admit.  Patient does have a history of cirrhosis and had elevated INR.  He was given vitamin K with no significant decrease in his INR and follow-up morning.  The patient undated discharge had a low potassium and magnesium which were supplemented prior to discharge.  The patient was given diabetic instruction during his hospitalization and will need compliance with alcohol cessation and a balanced diet.  The patient was ordered long-acting and short acting insulin pens at time of discharge with meds to bed ordered via the [facility].  The patient does have some ongoing anemia with a hemoglobin of 7.6 on date of discharge.  He has ongoing thrombocytopenia secondary to cirrhosis of liver with a platelet count of 84.  The patient did have an elevated ammonia level but was improved mentally after improvement of his DKA as well.       Therefore, he is discharged in good and stable condition to home with close outpatient follow-up.    The patient met 2-midnight criteria for an " inpatient stay at the time of discharge.    Discharge Date  11/23/21      FOLLOW UP ITEMS POST DISCHARGE  None    DISCHARGE DIAGNOSES  Active Problems:    Coagulopathy (HCC) POA: Unknown    Anemia POA: Yes    Thrombocytopenia (HCC) POA: Yes    Cirrhosis (HCC) POA: Unknown    Hypokalemia POA: Yes    Hyponatremia POA: Unknown  Resolved Problems:    DKA (diabetic ketoacidosis) (HCC) POA: Unknown    Lactate blood increase POA: Unknown      FOLLOW UP  No future appointments.  MARIAMA Frausto  [address] [facility] [id number] [phone]    Schedule an appointment as soon as possible for a visit        MEDICATIONS ON DISCHARGE     Medication List      START taking these medications      Instructions   Alcohol Swabs   Doctor's comments: Per formulary preference. ICD-10 code: E11.65 Uncontrolled type 2 Diabetes Mellitus  Wipe site with prep pad prior to injection.     * Blood Glucose Meter Kit   Doctor's comments: Or per formulary preference. ICD-10 code: E11.65 Uncontrolled type 2 Diabetes Mellitus  Test blood sugar as recommended by provider. One Touch Verio Flex blood glucose monitoring kit.     * Blood Glucose Test Strips   Doctor's comments: Or per formulary preference. ICD-10 code: E11.65 Uncontrolled type 2 Diabetes Mellitus  Use one One Touch Verio Flex strip to test blood sugar three times daily before meals.     HumaLOG Mix 50/50 KwikPen (50-50) 100 UNIT/ML Supn  Generic drug: Insulin Lispro Prot & Lispro   Doctor's comments: 1 box (5 pens) with 5 refills  1 unit insulin for every 15 grams CHO AC meals and 1 unit insulin for every 50mg/dl glucose above 200.     Insulin Pen Needle 32 G x 4 mm   Doctor's comments: Per patient/formulary preference. ICD-10 code: E11.65 Uncontrolled type 2 Diabetes Mellitus  Use one pen needle in pen device to inject insulin three times daily.     Lancets   Doctor's comments: Or per formulary preference. ICD-10 code: E11.65 Uncontrolled type 2 Diabetes Mellitus  Use one One  Touch Verio Flex lancet to test blood sugar three times daily before meals.     Lantus SoloStar 100 UNIT/ML Sopn injection  Generic drug: insulin glargine   Doctor's comments: 1 Each (1 box of five) for a month supply with 5 refills.  Inject 24 Units under the skin every evening.  Dose: 24 Units         * This list has 2 medication(s) that are the same as other medications prescribed for you. Read the directions carefully, and ask your doctor or other care provider to review them with you.            CONTINUE taking these medications      Instructions   metFORMIN 500 MG Tabs  Commonly known as: GLUCOPHAGE   Take 1 Tablet by mouth 2 times a day with meals.  Dose: 500 mg     omeprazole 20 MG delayed-release capsule  Commonly known as: PRILOSEC   Take 1 Capsule by mouth 2 times a day.  Dose: 20 mg     sucralfate 1 GM/10ML Susp  Commonly known as: CARAFATE   Take 10 mL by mouth every 8 hours.  Dose: 1 g        STOP taking these medications    methylPREDNISolone 4 MG Tbpk  Commonly known as: MEDROL DOSEPAK            Allergies  No Known Allergies    DIET  Orders Placed This Encounter   Procedures   • Diet Order Diet: Consistent CHO (Diabetic)     Standing Status:   Standing     Number of Occurrences:   1     Order Specific Question:   Diet:     Answer:   Consistent CHO (Diabetic) [4]       ACTIVITY  As tolerated.  Weight bearing as tolerated    CONSULTATIONS  Diabetic educator  Critical Care    PROCEDURES  None    LABORATORY  Lab Results   Component Value Date    SODIUM 130 (L) 11/23/2021    POTASSIUM 3.3 (L) 11/23/2021    CHLORIDE 99 11/23/2021    CO2 22 11/23/2021    GLUCOSE 230 (H) 11/23/2021    BUN 9 11/23/2021    CREATININE 0.29 (L) 11/23/2021        Lab Results   Component Value Date    WBC 6.6 11/23/2021    HEMOGLOBIN 7.6 (L) 11/23/2021    HEMATOCRIT 25.7 (L) 11/23/2021    PLATELETCT 84 (L) 11/23/2021        Total time of the discharge process exceeds 32 minutes.

## 2021-11-23 NOTE — PROGRESS NOTES
Report given to ANA Tian for S625-1. All questions answered at this time. Encouraged education on diabetes.

## 2021-11-24 NOTE — PROGRESS NOTES
Diabetes education: Met with pt prior to discharge to complete diabetes education. Pt was not as engaged in education as yesterday, but anxious to leave. In giving the education, pt states it was not rocket science. Pt did allow CDE to set up meter, teach meter and review finger sticks, instruct on the CHO ratio, and correction, review the [facility] DM book ( just given by CDE) and label reading, and instruct on insulin and insulin pens. Pt did practice with saline pen and practice device, and handout given on insulin pens. Ice given for insulin pens.

## 2021-11-24 NOTE — DISCHARGE PLANNING
Meds-to-Beds: Discharge prescription orders listed below delivered to patient in discharge lounge. RN Jonathon and ANGELIKA Cordova notified. Patient counseled. Patient elected to have co-payment billed to patient account.      Current Outpatient Medications   Medication Sig Dispense Refill   • insulin glargine (LANTUS) 100 UNIT/ML Solution Pen-injector injection Inject 24 Units under the skin every evening. 15 mL 5   • insulin lispro (HUMALOG KWIKPEN) 100 UNIT/ML Solution Pen-injector injection PEN Inject 1 unit insulin for every 15 grams of carbs before meals and 1 unit insulin for every 50mg/dl glucose above 200. 15 mL 5   • Blood Glucose Monitoring Suppl (ONETOUCH VERIO) Kit Test blood sugar as recommended by provider. 1 Kit 0   • ONETOUCH VERIO glucose blood strip Use one One Touch Verio Flex strip to test blood sugar three times daily before meals. 100 Strip 0   • ONETOUCH VERIO Lancets Use one  lancet to test blood sugar three times daily before meals. 100 Each 5   • Alcohol Swabs Wipe site with prep pad prior to injection. 100 Each 5   • Insulin Pen Needle 32 G x 4 mm Use one pen needle in pen device to inject insulin three times daily. 100 Each 5      Shayna Wilder, PharmD

## 2021-11-25 LAB — EKG IMPRESSION: NORMAL

## 2021-11-27 LAB
BACTERIA BLD CULT: NORMAL
BACTERIA BLD CULT: NORMAL
SIGNIFICANT IND 70042: NORMAL
SIGNIFICANT IND 70042: NORMAL
SITE SITE: NORMAL
SITE SITE: NORMAL
SOURCE SOURCE: NORMAL
SOURCE SOURCE: NORMAL

## (undated) DEVICE — ELECTRODE 850 FOAM ADHESIVE - HYDROGEL RADIOTRNSPRNT (50/PK)

## (undated) DEVICE — CANISTER SUCTION RIGID RED 1500CC (40EA/CA)

## (undated) DEVICE — TUBING O2 7FT TIP SMTH BORE - (50/CA)

## (undated) DEVICE — SET EXTENSION WITH 2 PORTS (48EA/CA) ***PART #2C8610 IS A SUBSTITUTE*****

## (undated) DEVICE — TUBE CONNECTING SUCTION - CLEAR PLASTIC STERILE 72 IN (50EA/CA)

## (undated) DEVICE — TOWEL STOP TIMEOUT SAFETY FLAG (40EA/CA)

## (undated) DEVICE — FILM CASSETTE ENDO

## (undated) DEVICE — MASK PANORAMIC OXYGEN PRO2 (30EA/CA)

## (undated) DEVICE — KIT CUSTOM PROCEDURE SINGLE FOR ENDO  (15/CA)

## (undated) DEVICE — WATER IRRIGATION STERILE 1000ML (12EA/CA)

## (undated) DEVICE — SET LEADWIRE 5 LEAD BEDSIDE DISPOSABLE ECG (1SET OF 5/EA)

## (undated) DEVICE — SENSOR SPO2 ADULT LNCS ADTX (20/BX) ORDER ITEM #19593

## (undated) DEVICE — BITE BLOCK ADULT 60FR (100EA/CA)